# Patient Record
Sex: FEMALE | Race: WHITE | Employment: OTHER | URBAN - METROPOLITAN AREA
[De-identification: names, ages, dates, MRNs, and addresses within clinical notes are randomized per-mention and may not be internally consistent; named-entity substitution may affect disease eponyms.]

---

## 2017-08-18 PROBLEM — G61.81 CHRONIC INFLAMMATORY DEMYELINATING POLYNEURITIS (HCC): Status: ACTIVE | Noted: 2017-08-18

## 2017-08-18 RX ORDER — SODIUM CHLORIDE 9 MG/ML
INJECTION, SOLUTION INTRAVENOUS CONTINUOUS
Status: CANCELLED | OUTPATIENT
Start: 2017-08-21

## 2017-08-18 RX ORDER — 0.9 % SODIUM CHLORIDE 0.9 %
10 VIAL (ML) INJECTION ONCE
Status: CANCELLED | OUTPATIENT
Start: 2017-08-21 | End: 2017-08-21

## 2017-08-18 RX ORDER — DIPHENHYDRAMINE HYDROCHLORIDE 50 MG/ML
50 INJECTION INTRAMUSCULAR; INTRAVENOUS ONCE
Status: CANCELLED | OUTPATIENT
Start: 2017-08-21 | End: 2017-08-21

## 2017-08-18 RX ORDER — METHYLPREDNISOLONE SODIUM SUCCINATE 125 MG/2ML
125 INJECTION, POWDER, LYOPHILIZED, FOR SOLUTION INTRAMUSCULAR; INTRAVENOUS ONCE
Status: CANCELLED | OUTPATIENT
Start: 2017-08-21 | End: 2017-08-21

## 2017-08-21 ENCOUNTER — HOSPITAL ENCOUNTER (OUTPATIENT)
Dept: INFUSION THERAPY | Age: 62
Discharge: HOME | End: 2017-08-31
Attending: PSYCHIATRY & NEUROLOGY | Admitting: PSYCHIATRY & NEUROLOGY

## 2017-08-21 VITALS
HEART RATE: 65 BPM | OXYGEN SATURATION: 92 % | DIASTOLIC BLOOD PRESSURE: 72 MMHG | SYSTOLIC BLOOD PRESSURE: 139 MMHG | TEMPERATURE: 97.8 F | RESPIRATION RATE: 18 BRPM

## 2017-08-21 DIAGNOSIS — G61.81 CHRONIC INFLAMMATORY DEMYELINATING POLYNEURITIS (HCC): ICD-10-CM

## 2017-08-21 RX ORDER — 0.9 % SODIUM CHLORIDE 0.9 %
10 VIAL (ML) INJECTION PRN
Status: ACTIVE | OUTPATIENT
Start: 2017-08-21 | End: 2017-08-22

## 2017-08-21 RX ORDER — DIPHENHYDRAMINE HCL 25 MG
25 TABLET ORAL ONCE
Status: CANCELLED | OUTPATIENT
Start: 2017-08-21 | End: 2017-08-21

## 2017-08-21 RX ORDER — ACETAMINOPHEN 325 MG/1
650 TABLET ORAL ONCE
Status: CANCELLED | OUTPATIENT
Start: 2017-08-21 | End: 2017-08-21

## 2017-08-21 RX ORDER — DIPHENHYDRAMINE HCL 25 MG
25 TABLET ORAL ONCE
Status: COMPLETED | OUTPATIENT
Start: 2017-08-21 | End: 2017-08-21

## 2017-08-21 RX ORDER — ACETAMINOPHEN 325 MG/1
650 TABLET ORAL ONCE
Status: COMPLETED | OUTPATIENT
Start: 2017-08-21 | End: 2017-08-21

## 2017-08-21 RX ORDER — SODIUM CHLORIDE 9 MG/ML
INJECTION, SOLUTION INTRAVENOUS CONTINUOUS
Status: CANCELLED | OUTPATIENT
Start: 2017-08-21

## 2017-08-21 RX ORDER — METHYLPREDNISOLONE SODIUM SUCCINATE 125 MG/2ML
125 INJECTION, POWDER, LYOPHILIZED, FOR SOLUTION INTRAMUSCULAR; INTRAVENOUS ONCE
Status: CANCELLED | OUTPATIENT
Start: 2017-08-21 | End: 2017-08-21

## 2017-08-21 RX ORDER — METAXALONE 800 MG/1
800 TABLET ORAL 2 TIMES DAILY
COMMUNITY

## 2017-08-21 RX ORDER — 0.9 % SODIUM CHLORIDE 0.9 %
10 VIAL (ML) INJECTION ONCE
Status: CANCELLED | OUTPATIENT
Start: 2017-08-21 | End: 2017-08-21

## 2017-08-21 RX ORDER — 0.9 % SODIUM CHLORIDE 0.9 %
10 VIAL (ML) INJECTION PRN
Status: CANCELLED | OUTPATIENT
Start: 2017-08-21

## 2017-08-21 RX ORDER — DIPHENHYDRAMINE HYDROCHLORIDE 50 MG/ML
50 INJECTION INTRAMUSCULAR; INTRAVENOUS ONCE
Status: CANCELLED | OUTPATIENT
Start: 2017-08-21 | End: 2017-08-21

## 2017-08-21 RX ADMIN — Medication 25 MG: at 12:00

## 2017-08-21 RX ADMIN — ACETAMINOPHEN 650 MG: 325 TABLET ORAL at 12:00

## 2017-08-21 ASSESSMENT — PAIN SCALES - GENERAL: PAINLEVEL_OUTOF10: 0

## 2017-08-22 ENCOUNTER — HOSPITAL ENCOUNTER (OUTPATIENT)
Dept: INFUSION THERAPY | Age: 62
Discharge: HOME | End: 2017-08-23
Admitting: PSYCHIATRY & NEUROLOGY

## 2017-08-22 VITALS
OXYGEN SATURATION: 94 % | RESPIRATION RATE: 18 BRPM | DIASTOLIC BLOOD PRESSURE: 76 MMHG | SYSTOLIC BLOOD PRESSURE: 146 MMHG | TEMPERATURE: 98 F | HEART RATE: 66 BPM

## 2017-08-22 DIAGNOSIS — G61.81 CHRONIC INFLAMMATORY DEMYELINATING POLYNEURITIS (HCC): ICD-10-CM

## 2017-08-22 RX ORDER — ACETAMINOPHEN 325 MG/1
650 TABLET ORAL ONCE
Status: COMPLETED | OUTPATIENT
Start: 2017-08-22 | End: 2017-08-22

## 2017-08-22 RX ORDER — ACETAMINOPHEN 325 MG/1
650 TABLET ORAL ONCE
Status: CANCELLED | OUTPATIENT
Start: 2017-08-22 | End: 2017-08-22

## 2017-08-22 RX ORDER — DIPHENHYDRAMINE HCL 25 MG
25 TABLET ORAL ONCE
Status: COMPLETED | OUTPATIENT
Start: 2017-08-22 | End: 2017-08-22

## 2017-08-22 RX ORDER — 0.9 % SODIUM CHLORIDE 0.9 %
10 VIAL (ML) INJECTION PRN
Status: ACTIVE | OUTPATIENT
Start: 2017-08-22 | End: 2017-08-23

## 2017-08-22 RX ORDER — 0.9 % SODIUM CHLORIDE 0.9 %
10 VIAL (ML) INJECTION ONCE
Status: CANCELLED | OUTPATIENT
Start: 2017-08-22 | End: 2017-08-22

## 2017-08-22 RX ORDER — SODIUM CHLORIDE 9 MG/ML
INJECTION, SOLUTION INTRAVENOUS CONTINUOUS
Status: CANCELLED | OUTPATIENT
Start: 2017-08-22

## 2017-08-22 RX ORDER — DIPHENHYDRAMINE HCL 25 MG
25 TABLET ORAL ONCE
Status: CANCELLED | OUTPATIENT
Start: 2017-08-22 | End: 2017-08-22

## 2017-08-22 RX ORDER — 0.9 % SODIUM CHLORIDE 0.9 %
10 VIAL (ML) INJECTION PRN
Status: CANCELLED | OUTPATIENT
Start: 2017-08-22

## 2017-08-22 RX ORDER — DIPHENHYDRAMINE HYDROCHLORIDE 50 MG/ML
50 INJECTION INTRAMUSCULAR; INTRAVENOUS ONCE
Status: CANCELLED | OUTPATIENT
Start: 2017-08-22 | End: 2017-08-22

## 2017-08-22 RX ORDER — METHYLPREDNISOLONE SODIUM SUCCINATE 125 MG/2ML
125 INJECTION, POWDER, LYOPHILIZED, FOR SOLUTION INTRAMUSCULAR; INTRAVENOUS ONCE
Status: CANCELLED | OUTPATIENT
Start: 2017-08-22 | End: 2017-08-22

## 2017-08-22 RX ADMIN — Medication 25 MG: at 11:57

## 2017-08-22 RX ADMIN — ACETAMINOPHEN 650 MG: 325 TABLET ORAL at 11:57

## 2017-08-22 ASSESSMENT — PAIN SCALES - GENERAL
PAINLEVEL_OUTOF10: 0
PAINLEVEL_OUTOF10: 0

## 2019-10-21 RX ORDER — EPINEPHRINE 1 MG/ML
0.3 INJECTION, SOLUTION, CONCENTRATE INTRAVENOUS PRN
Status: CANCELLED | OUTPATIENT
Start: 2019-10-28

## 2019-10-21 RX ORDER — SODIUM CHLORIDE 9 MG/ML
INJECTION, SOLUTION INTRAVENOUS CONTINUOUS
Status: CANCELLED | OUTPATIENT
Start: 2019-10-28

## 2019-10-21 RX ORDER — SODIUM CHLORIDE 0.9 % (FLUSH) 0.9 %
10 SYRINGE (ML) INJECTION PRN
Status: CANCELLED | OUTPATIENT
Start: 2019-10-28

## 2019-10-21 RX ORDER — ACETAMINOPHEN 325 MG/1
650 TABLET ORAL ONCE
Status: CANCELLED | OUTPATIENT
Start: 2019-10-28

## 2019-10-21 RX ORDER — METHYLPREDNISOLONE SODIUM SUCCINATE 125 MG/2ML
125 INJECTION, POWDER, LYOPHILIZED, FOR SOLUTION INTRAMUSCULAR; INTRAVENOUS ONCE
Status: CANCELLED | OUTPATIENT
Start: 2019-10-28

## 2019-10-21 RX ORDER — DIPHENHYDRAMINE HYDROCHLORIDE 50 MG/ML
50 INJECTION INTRAMUSCULAR; INTRAVENOUS ONCE
Status: CANCELLED | OUTPATIENT
Start: 2019-10-28

## 2019-10-21 RX ORDER — DIPHENHYDRAMINE HCL 25 MG
25 TABLET ORAL ONCE
Status: CANCELLED | OUTPATIENT
Start: 2019-10-28

## 2019-12-02 ENCOUNTER — HOSPITAL ENCOUNTER (OUTPATIENT)
Dept: INFUSION THERAPY | Age: 64
Setting detail: INFUSION SERIES
Discharge: HOME | End: 2019-12-02
Payer: MEDICARE

## 2019-12-02 VITALS
WEIGHT: 244.49 LBS | HEART RATE: 65 BPM | SYSTOLIC BLOOD PRESSURE: 141 MMHG | BODY MASS INDEX: 40.69 KG/M2 | RESPIRATION RATE: 18 BRPM | DIASTOLIC BLOOD PRESSURE: 91 MMHG | OXYGEN SATURATION: 94 % | TEMPERATURE: 97.6 F

## 2019-12-02 DIAGNOSIS — G61.81 CHRONIC INFLAMMATORY DEMYELINATING POLYNEURITIS (HCC): Primary | ICD-10-CM

## 2019-12-02 PROCEDURE — 96366 THER/PROPH/DIAG IV INF ADDON: CPT

## 2019-12-02 PROCEDURE — 96365 THER/PROPH/DIAG IV INF INIT: CPT

## 2019-12-02 PROCEDURE — 2580000003 HC RX 258: Performed by: PSYCHIATRY & NEUROLOGY

## 2019-12-02 PROCEDURE — 6370000000 HC RX 637 (ALT 250 FOR IP): Performed by: PSYCHIATRY & NEUROLOGY

## 2019-12-02 PROCEDURE — 6360000002 HC RX W HCPCS: Performed by: PSYCHIATRY & NEUROLOGY

## 2019-12-02 RX ORDER — ACETAMINOPHEN 325 MG/1
650 TABLET ORAL ONCE
Status: COMPLETED | OUTPATIENT
Start: 2019-12-02 | End: 2019-12-02

## 2019-12-02 RX ORDER — SODIUM CHLORIDE 0.9 % (FLUSH) 0.9 %
10 SYRINGE (ML) INJECTION PRN
Status: CANCELLED | OUTPATIENT
Start: 2019-12-03

## 2019-12-02 RX ORDER — DIPHENHYDRAMINE HCL 25 MG
25 TABLET ORAL ONCE
Status: CANCELLED | OUTPATIENT
Start: 2019-12-03

## 2019-12-02 RX ORDER — SODIUM CHLORIDE 9 MG/ML
INJECTION, SOLUTION INTRAVENOUS CONTINUOUS
Status: CANCELLED | OUTPATIENT
Start: 2019-12-03

## 2019-12-02 RX ORDER — ACETAMINOPHEN 325 MG/1
650 TABLET ORAL ONCE
Status: CANCELLED | OUTPATIENT
Start: 2019-12-03

## 2019-12-02 RX ORDER — DIPHENHYDRAMINE HCL 25 MG
25 TABLET ORAL ONCE
Status: COMPLETED | OUTPATIENT
Start: 2019-12-02 | End: 2019-12-02

## 2019-12-02 RX ORDER — PREDNISONE 1 MG/1
1.5 TABLET ORAL DAILY
COMMUNITY

## 2019-12-02 RX ORDER — DIPHENHYDRAMINE HYDROCHLORIDE 50 MG/ML
50 INJECTION INTRAMUSCULAR; INTRAVENOUS ONCE
Status: CANCELLED | OUTPATIENT
Start: 2019-12-03

## 2019-12-02 RX ORDER — METOPROLOL TARTRATE 50 MG/1
100 TABLET, FILM COATED ORAL DAILY
COMMUNITY

## 2019-12-02 RX ORDER — SODIUM CHLORIDE 0.9 % (FLUSH) 0.9 %
10 SYRINGE (ML) INJECTION PRN
Status: DISCONTINUED | OUTPATIENT
Start: 2019-12-02 | End: 2019-12-03 | Stop reason: HOSPADM

## 2019-12-02 RX ORDER — METHYLPREDNISOLONE SODIUM SUCCINATE 125 MG/2ML
125 INJECTION, POWDER, LYOPHILIZED, FOR SOLUTION INTRAMUSCULAR; INTRAVENOUS ONCE
Status: CANCELLED | OUTPATIENT
Start: 2019-12-03

## 2019-12-02 RX ORDER — EPINEPHRINE 1 MG/ML
0.3 INJECTION, SOLUTION, CONCENTRATE INTRAVENOUS PRN
Status: CANCELLED | OUTPATIENT
Start: 2019-12-03

## 2019-12-02 RX ADMIN — IMMUNE GLOBULIN (HUMAN) 10 G: 10 INJECTION INTRAVENOUS; SUBCUTANEOUS at 12:56

## 2019-12-02 RX ADMIN — DIPHENHYDRAMINE HCL 25 MG: 25 TABLET ORAL at 12:03

## 2019-12-02 RX ADMIN — Medication 10 ML: at 12:52

## 2019-12-02 RX ADMIN — IMMUNE GLOBULIN (HUMAN) 10 G: 10 INJECTION INTRAVENOUS; SUBCUTANEOUS at 15:43

## 2019-12-02 RX ADMIN — IMMUNE GLOBULIN (HUMAN) 10 G: 10 INJECTION INTRAVENOUS; SUBCUTANEOUS at 14:43

## 2019-12-02 RX ADMIN — IMMUNE GLOBULIN (HUMAN) 20 G: 10 INJECTION INTRAVENOUS; SUBCUTANEOUS at 15:40

## 2019-12-02 RX ADMIN — ACETAMINOPHEN 650 MG: 325 TABLET ORAL at 12:03

## 2019-12-02 ASSESSMENT — PAIN SCALES - GENERAL: PAINLEVEL_OUTOF10: 0

## 2019-12-02 NOTE — PROGRESS NOTES
Outpatient [address] [facility]    IVIG Infusion    NAME:  April Nina OF BIRTH:  1955  MEDICAL RECORD NUMBER:  [id number]  DATE:  12/2/2019    Patient arrived to [facility] 58   [x] per wheelchair   [] ambulatory      Patient here for IVIG infusion for diagnosis of CIDP, Chronic inflammatory demyelinating polyneuropathy. Patient has severe pain and weakness in both legs. Uses a walker to ambulate and a wheelchair for long distances. Couple of years ago she suffered a fall and fractured tailbone which was fused together and still has some sciatica pain down both legs. History of auto immune disorder, asthma and HTN. She lives alone and gets help from extended family for rides for appointments. Has some concern today because she is not getting the Gamma ariel liqiud IVIG which she usually has but hospital unable to get this brand. She will be getting Gammunex IVIG. Patient follows with Dr. Abdon Moody and she will be getting 100 grams IVIG divided over 2 days. Is the patient experiencing any:    Fatigue:   []   None  []   Increase over baseline but not altering normal activities  [x]   Moderate of causing difficulty performing some activities  []   Severe or loss of ability to perform some activities  []   Bedridden or disabling    Dizziness or Lightheadedness:   [x]   None  []   No Interference  []   Interferes with functioning but not activities of daily living  []   Interferes with daily activies  []   Bedridden or disabling    Shortness of Breath:   [x]   None   []   Dyspneic on exertion   []   Dyspnea with normal activities  []   Dyspnea at rest    Are you on antibiotics presently? No, not presently but did have a chest cold couple of weaks ago. Denies any fever or residual cough. Any recent illness or infection? Yes a couple of weeks ago but OK now    Any fever, chilling or night sweats?  No    Dosage: 50 Grams     BP (!) 141/91   Pulse 65   Temp 97.6 °F (36.4 °C) (Oral)   Resp 18   Wt 244 lb 7.8 oz (110.9 kg)   SpO2 94%   BMI 40.69 kg/m²      CMP done on 10/30/19:        Creatinine 0.67        GFR 88    Patient premedicated with Benadryl 25 mg po and Tylenol 650 mg po as ordered. Patient getting 50 grams of Gammunex IVIG today and 50 grams tomorrow. Had some difficulty getting IV started and took about 3 attempts. IVIG started at 15 ml/hr and rate gradually increased every 30 min to 30 ml/hr, 60 ml/hr, 100 ml/hr, 120 ml/hr and 130 ml/hr. Complained of a little headache when rate up to 130 ml/hr so rate dropped back down to 120 ml. With last bottle headache went away and last bottle finished out at 130 ml/hour with slight headache. BP remained around 580-304 systolic and 92-10 diastolic. Patient took her 2nd dose of BP medicine after lunch and BP readings came down and headache better. No flushing, fever or chills. Tolerated IVIG well. See flow sheet for rate changes and vital signs. IVIG infused over 5 hours. Patient tolerated well. Her cousin came to give her a ride home. Discharged per wheelchair. Will return tomorrow for another 50 grams of IVIG.

## 2019-12-03 ENCOUNTER — HOSPITAL ENCOUNTER (OUTPATIENT)
Dept: INFUSION THERAPY | Age: 64
Setting detail: INFUSION SERIES
Discharge: HOME | End: 2019-12-03
Payer: MEDICARE

## 2019-12-03 VITALS
TEMPERATURE: 97.5 F | RESPIRATION RATE: 18 BRPM | SYSTOLIC BLOOD PRESSURE: 153 MMHG | OXYGEN SATURATION: 94 % | HEART RATE: 64 BPM | DIASTOLIC BLOOD PRESSURE: 75 MMHG

## 2019-12-03 DIAGNOSIS — G61.81 CHRONIC INFLAMMATORY DEMYELINATING POLYNEURITIS (HCC): Primary | ICD-10-CM

## 2019-12-03 PROCEDURE — 96366 THER/PROPH/DIAG IV INF ADDON: CPT

## 2019-12-03 PROCEDURE — 96365 THER/PROPH/DIAG IV INF INIT: CPT

## 2019-12-03 PROCEDURE — 6370000000 HC RX 637 (ALT 250 FOR IP): Performed by: PSYCHIATRY & NEUROLOGY

## 2019-12-03 PROCEDURE — 6360000002 HC RX W HCPCS: Performed by: PSYCHIATRY & NEUROLOGY

## 2019-12-03 PROCEDURE — 2580000003 HC RX 258: Performed by: PSYCHIATRY & NEUROLOGY

## 2019-12-03 RX ORDER — SODIUM CHLORIDE 9 MG/ML
INJECTION, SOLUTION INTRAVENOUS CONTINUOUS
Status: CANCELLED | OUTPATIENT
Start: 2019-12-03

## 2019-12-03 RX ORDER — DIPHENHYDRAMINE HCL 25 MG
25 TABLET ORAL ONCE
Status: CANCELLED | OUTPATIENT
Start: 2019-12-03

## 2019-12-03 RX ORDER — METHYLPREDNISOLONE SODIUM SUCCINATE 125 MG/2ML
125 INJECTION, POWDER, LYOPHILIZED, FOR SOLUTION INTRAMUSCULAR; INTRAVENOUS ONCE
Status: CANCELLED | OUTPATIENT
Start: 2019-12-03

## 2019-12-03 RX ORDER — EPINEPHRINE 1 MG/ML
0.3 INJECTION, SOLUTION, CONCENTRATE INTRAVENOUS PRN
Status: CANCELLED | OUTPATIENT
Start: 2019-12-03

## 2019-12-03 RX ORDER — ACETAMINOPHEN 325 MG/1
650 TABLET ORAL ONCE
Status: CANCELLED | OUTPATIENT
Start: 2019-12-03

## 2019-12-03 RX ORDER — DIPHENHYDRAMINE HCL 25 MG
25 TABLET ORAL ONCE
Status: COMPLETED | OUTPATIENT
Start: 2019-12-03 | End: 2019-12-03

## 2019-12-03 RX ORDER — SODIUM CHLORIDE 0.9 % (FLUSH) 0.9 %
10 SYRINGE (ML) INJECTION PRN
Status: DISCONTINUED | OUTPATIENT
Start: 2019-12-03 | End: 2019-12-04 | Stop reason: HOSPADM

## 2019-12-03 RX ORDER — DIPHENHYDRAMINE HYDROCHLORIDE 50 MG/ML
50 INJECTION INTRAMUSCULAR; INTRAVENOUS ONCE
Status: CANCELLED | OUTPATIENT
Start: 2019-12-03

## 2019-12-03 RX ORDER — ACETAMINOPHEN 325 MG/1
650 TABLET ORAL ONCE
Status: COMPLETED | OUTPATIENT
Start: 2019-12-03 | End: 2019-12-03

## 2019-12-03 RX ORDER — SODIUM CHLORIDE 0.9 % (FLUSH) 0.9 %
10 SYRINGE (ML) INJECTION PRN
Status: CANCELLED | OUTPATIENT
Start: 2019-12-03

## 2019-12-03 RX ADMIN — DIPHENHYDRAMINE HCL 25 MG: 25 TABLET ORAL at 12:16

## 2019-12-03 RX ADMIN — IMMUNE GLOBULIN (HUMAN) 10 G: 10 INJECTION INTRAVENOUS; SUBCUTANEOUS at 12:26

## 2019-12-03 RX ADMIN — ACETAMINOPHEN 650 MG: 325 TABLET ORAL at 12:16

## 2019-12-03 RX ADMIN — IMMUNE GLOBULIN (HUMAN) 40 G: 10 INJECTION INTRAVENOUS; SUBCUTANEOUS at 13:45

## 2019-12-03 RX ADMIN — Medication 10 ML: at 17:18

## 2019-12-03 RX ADMIN — Medication 10 ML: at 17:02

## 2019-12-03 RX ADMIN — Medication 10 ML: at 12:08

## 2019-12-03 ASSESSMENT — PAIN SCALES - GENERAL
PAINLEVEL_OUTOF10: 0
PAINLEVEL_OUTOF10: 0

## 2019-12-03 NOTE — PROGRESS NOTES
[facility]    [facility]    NAME:  Ermelinda Seed OF BIRTH:  1955  MEDICAL RECORD NUMBER:  [id number]  DATE:  12/3/2019    Patient arrived to [facility] 58   [] per wheelchair   [x] ambulatory     Patient here for 2nd dose of IVIG. Tolerated 50 grams of IVIG well yesterday. Denies any headache, fever, chilling or itching. Patient is getting IVIG for CIPD which causes pain and weakness in both legs and is being treated by Dr. Venita Kang, neurologist.  Wilmer Rincon with walker. Has had limited mobility for several years, has an elevator in her house. Has had bariatric surgery, gastric bypass in Jan 2019 and has lost about 109 lbs. Is the patient experiencing any:    Fatigue:   []   None  []   Increase over baseline but not altering normal activities  [x]   Moderate of causing difficulty performing some activities  []   Severe or loss of ability to perform some activities  []   Bedridden or disabling    Dizziness or Lightheadedness:   [x]   None  []   No Interference  []   Interferes with functioning but not activities of daily living  []   Interferes with daily activies  []   Bedridden or disabling    Shortness of Breath:   []   None   [x]   Dyspneic on exertion   []   Dyspnea with normal activities  []   Dyspnea at rest    Are you on antibiotics presently? No    Any recent illness or infection? No    Any fever, chilling or night sweats? No    Dosage: 50 Grams     BP (!) 149/88   Pulse 65   Temp 97.8 °F (36.6 °C) (Oral)   Resp 18   SpO2 95%       CMP:  Done on 10/30/19 - Creatinine 0.67  And GFR 88. Care Everywhere Result Report    COMPREHENSIVE METABOLIC PANELResulted: 80/22/2552 9:13 PM  [facility]  Component Name Value Ref Range   Sodium 137 135 - 146 mmol/L   Potassium 4.4 3.5 - 5.1 mmol/L   Chloride 101 98 - 110 mmol/L   CO2 28 22 - 29 mmol/L   Anion Gap 8   Comment: Anion gap calculation does not include potassium (K+) value.  5 - 13 mmol/L   BUN 12 7 - 25 mg/dL   Creatinine 0.67 0.5 - 1.2 mg/dL   Glucose 96 70 - 99 mg/dL   GFR MDRD Af Amer 107   Comment:  GFR is estimated using Creatinine, age, gender and race. Patient's values should be interpreted as a trend.      Below 90 ml/min/1.73m2, the patient may have renal disease.       For additional information:     [email or web address] See Note   Calcium 9.8 8.4 - 10.5 mg/dL   GFR MDRD Non Af Amer 88   Comment:  GFR is estimated using Creatinine, age, gender and race. Patient's values should be interpreted as a trend.      Between 30 and 90 ml/min/1.73m2, clinical correlation is needed.     For additional information:     [email or web address] org See Note   Total Bilirubin 0.4 0.2 - 1.2 mg/dL   AST 13 0 - 30 U/L   ALT 13 0 - 40 U/L   Alkaline Phosphatase 113 33 - 140 U/L   Total Protein 7.1 6 - 8 g/dL   Albumin 4.1 3.5 - 5 g/dL   Globulin 3.0 2 - 3.7 g/dL   Albumin/Globulin Ratio 1.4 1 - 2.1    BUN/Creatinine Ratio 18     Specimen Collected on   SERUM - Serum 10/30/2019 3:24 PM           Patient received total of 50 grams Gammunex IVIG today. Infusion started at 50 ml/hr and increased slowly about every 30 minutes until rate at 130 ml/hr and patient finished infusion at this rate. Total infusion lasting 4.5 hours. Patient tolerated infusion well and denied any fever, chilling, headache, itching or SOB. Vital signs stable. Monitoring of vital signs and rate changes are documented in flow sheets. Response to treatment:  Well tolerated by patient.       Electronically signed by Sukhi Oshea RN on 12/3/2019 at 7:14 PM.

## 2019-12-26 ENCOUNTER — HOSPITAL ENCOUNTER (EMERGENCY)
Age: 64
Discharge: HOME | End: 2019-12-26
Payer: MEDICARE

## 2019-12-26 VITALS
BODY MASS INDEX: 40.06 KG/M2 | HEART RATE: 88 BPM | TEMPERATURE: 97.8 F | DIASTOLIC BLOOD PRESSURE: 94 MMHG | WEIGHT: 240.74 LBS | SYSTOLIC BLOOD PRESSURE: 193 MMHG | RESPIRATION RATE: 16 BRPM | OXYGEN SATURATION: 96 %

## 2019-12-26 DIAGNOSIS — R21 RASH AND OTHER NONSPECIFIC SKIN ERUPTION: Primary | ICD-10-CM

## 2019-12-26 DIAGNOSIS — Z87.2 HISTORY OF PSORIASIS: ICD-10-CM

## 2019-12-26 PROCEDURE — 6360000002 HC RX W HCPCS: Performed by: PHYSICIAN ASSISTANT

## 2019-12-26 PROCEDURE — 99282 EMERGENCY DEPT VISIT SF MDM: CPT

## 2019-12-26 PROCEDURE — 96372 THER/PROPH/DIAG INJ SC/IM: CPT

## 2019-12-26 RX ORDER — TRIAMCINOLONE ACETONIDE 1 MG/G
CREAM TOPICAL
Qty: 80 G | Refills: 0 | Status: SHIPPED | OUTPATIENT
Start: 2019-12-26 | End: 2021-07-19

## 2019-12-26 RX ORDER — METHYLPREDNISOLONE ACETATE 80 MG/ML
120 INJECTION, SUSPENSION INTRA-ARTICULAR; INTRALESIONAL; INTRAMUSCULAR; SOFT TISSUE ONCE
Status: COMPLETED | OUTPATIENT
Start: 2019-12-26 | End: 2019-12-26

## 2019-12-26 RX ADMIN — METHYLPREDNISOLONE ACETATE 120 MG: 80 INJECTION, SUSPENSION INTRA-ARTICULAR; INTRALESIONAL; INTRAMUSCULAR; SOFT TISSUE at 15:51

## 2020-01-09 ENCOUNTER — TELEPHONE (OUTPATIENT)
Dept: INFUSION THERAPY | Age: 65
End: 2020-01-09

## 2020-10-15 ENCOUNTER — TELEPHONE (OUTPATIENT)
Dept: INFUSION THERAPY | Age: 65
End: 2020-10-15

## 2020-10-15 NOTE — TELEPHONE ENCOUNTER
Patient is due for an IVIG treatment. Dr. Lyric Murphy is sending an order and patient scheduled For 11/9/2020 and 11/10/2020. Patient wants to hold off on IVIG right now due to increase in COVID cases. She is worried about getting sick because she is immunocompromised. Will hold off IVIG appointment appointment for now. Patient will call us when she is ready.

## 2020-11-09 ENCOUNTER — HOSPITAL ENCOUNTER (OUTPATIENT)
Dept: INFUSION THERAPY | Age: 65
Setting detail: INFUSION SERIES
End: 2020-11-09
Payer: MEDICARE

## 2021-03-03 ENCOUNTER — IMMUNIZATION (OUTPATIENT)
Dept: PRIMARY CARE CLINIC | Age: 66
End: 2021-03-03
Payer: MEDICARE

## 2021-03-03 PROCEDURE — 91301 COVID-19, MODERNA VACCINE 100MCG/0.5ML DOSE: CPT | Performed by: FAMILY MEDICINE

## 2021-03-03 PROCEDURE — 0011A COVID-19, MODERNA VACCINE 100MCG/0.5ML DOSE: CPT | Performed by: FAMILY MEDICINE

## 2021-03-31 ENCOUNTER — IMMUNIZATION (OUTPATIENT)
Dept: PRIMARY CARE CLINIC | Age: 66
End: 2021-03-31
Payer: MEDICARE

## 2021-03-31 PROCEDURE — 91301 COVID-19, MODERNA VACCINE 100MCG/0.5ML DOSE: CPT | Performed by: FAMILY MEDICINE

## 2021-03-31 PROCEDURE — 0012A COVID-19, MODERNA VACCINE 100MCG/0.5ML DOSE: CPT | Performed by: FAMILY MEDICINE

## 2021-06-22 RX ORDER — DIPHENHYDRAMINE HYDROCHLORIDE 50 MG/ML
50 INJECTION INTRAMUSCULAR; INTRAVENOUS ONCE
Status: CANCELLED | OUTPATIENT
Start: 2021-07-19 | End: 2021-07-19

## 2021-06-22 RX ORDER — EPINEPHRINE 1 MG/ML
0.3 INJECTION, SOLUTION, CONCENTRATE INTRAVENOUS PRN
Status: CANCELLED | OUTPATIENT
Start: 2021-07-19

## 2021-06-22 RX ORDER — DIPHENHYDRAMINE HCL 25 MG
25 TABLET ORAL ONCE
Status: CANCELLED | OUTPATIENT
Start: 2021-07-19 | End: 2021-07-19

## 2021-06-22 RX ORDER — SODIUM CHLORIDE 9 MG/ML
INJECTION, SOLUTION INTRAVENOUS CONTINUOUS
Status: CANCELLED | OUTPATIENT
Start: 2021-07-19

## 2021-06-22 RX ORDER — SODIUM CHLORIDE 0.9 % (FLUSH) 0.9 %
5-40 SYRINGE (ML) INJECTION PRN
Status: CANCELLED | OUTPATIENT
Start: 2021-07-19

## 2021-06-22 RX ORDER — ACETAMINOPHEN 325 MG/1
650 TABLET ORAL ONCE
Status: CANCELLED | OUTPATIENT
Start: 2021-07-19 | End: 2021-07-19

## 2021-06-22 RX ORDER — METHYLPREDNISOLONE SODIUM SUCCINATE 125 MG/2ML
125 INJECTION, POWDER, LYOPHILIZED, FOR SOLUTION INTRAMUSCULAR; INTRAVENOUS ONCE
Status: CANCELLED | OUTPATIENT
Start: 2021-07-19 | End: 2021-07-19

## 2021-07-19 ENCOUNTER — HOSPITAL ENCOUNTER (OUTPATIENT)
Dept: INFUSION THERAPY | Age: 66
Setting detail: INFUSION SERIES
Discharge: HOME | End: 2021-07-19
Payer: MEDICARE

## 2021-07-19 VITALS
DIASTOLIC BLOOD PRESSURE: 73 MMHG | OXYGEN SATURATION: 95 % | BODY MASS INDEX: 30.85 KG/M2 | WEIGHT: 185.41 LBS | HEART RATE: 64 BPM | TEMPERATURE: 97.7 F | SYSTOLIC BLOOD PRESSURE: 148 MMHG | RESPIRATION RATE: 18 BRPM

## 2021-07-19 DIAGNOSIS — G61.81 CHRONIC INFLAMMATORY DEMYELINATING POLYNEURITIS (HCC): Primary | ICD-10-CM

## 2021-07-19 PROCEDURE — 96365 THER/PROPH/DIAG IV INF INIT: CPT

## 2021-07-19 PROCEDURE — 2580000003 HC RX 258: Performed by: PSYCHIATRY & NEUROLOGY

## 2021-07-19 PROCEDURE — 6370000000 HC RX 637 (ALT 250 FOR IP): Performed by: PSYCHIATRY & NEUROLOGY

## 2021-07-19 PROCEDURE — 96366 THER/PROPH/DIAG IV INF ADDON: CPT

## 2021-07-19 PROCEDURE — 6360000002 HC RX W HCPCS: Performed by: PSYCHIATRY & NEUROLOGY

## 2021-07-19 RX ORDER — EPINEPHRINE 1 MG/ML
0.3 INJECTION, SOLUTION, CONCENTRATE INTRAVENOUS PRN
Status: CANCELLED | OUTPATIENT
Start: 2021-07-20

## 2021-07-19 RX ORDER — DIPHENHYDRAMINE HCL 25 MG
25 TABLET ORAL ONCE
Status: CANCELLED | OUTPATIENT
Start: 2021-07-20 | End: 2021-07-20

## 2021-07-19 RX ORDER — SODIUM CHLORIDE 0.9 % (FLUSH) 0.9 %
5-40 SYRINGE (ML) INJECTION PRN
Status: CANCELLED | OUTPATIENT
Start: 2021-07-20

## 2021-07-19 RX ORDER — ACETAMINOPHEN 325 MG/1
650 TABLET ORAL ONCE
Status: COMPLETED | OUTPATIENT
Start: 2021-07-19 | End: 2021-07-19

## 2021-07-19 RX ORDER — DIPHENHYDRAMINE HCL 25 MG
25 TABLET ORAL ONCE
Status: COMPLETED | OUTPATIENT
Start: 2021-07-19 | End: 2021-07-19

## 2021-07-19 RX ORDER — SODIUM CHLORIDE 0.9 % (FLUSH) 0.9 %
5-40 SYRINGE (ML) INJECTION PRN
Status: DISCONTINUED | OUTPATIENT
Start: 2021-07-19 | End: 2021-07-20 | Stop reason: HOSPADM

## 2021-07-19 RX ORDER — DIPHENHYDRAMINE HYDROCHLORIDE 50 MG/ML
50 INJECTION INTRAMUSCULAR; INTRAVENOUS ONCE
Status: CANCELLED | OUTPATIENT
Start: 2021-07-20 | End: 2021-07-20

## 2021-07-19 RX ORDER — METHYLPREDNISOLONE SODIUM SUCCINATE 125 MG/2ML
125 INJECTION, POWDER, LYOPHILIZED, FOR SOLUTION INTRAMUSCULAR; INTRAVENOUS ONCE
Status: CANCELLED | OUTPATIENT
Start: 2021-07-20 | End: 2021-07-20

## 2021-07-19 RX ORDER — SODIUM CHLORIDE 9 MG/ML
INJECTION, SOLUTION INTRAVENOUS CONTINUOUS
Status: CANCELLED | OUTPATIENT
Start: 2021-07-20

## 2021-07-19 RX ORDER — ACETAMINOPHEN 325 MG/1
650 TABLET ORAL ONCE
Status: CANCELLED | OUTPATIENT
Start: 2021-07-20 | End: 2021-07-20

## 2021-07-19 RX ADMIN — Medication 10 ML: at 13:09

## 2021-07-19 RX ADMIN — IMMUNE GLOBULIN INFUSION (HUMAN) 20 G: 100 INJECTION, SOLUTION INTRAVENOUS; SUBCUTANEOUS at 16:00

## 2021-07-19 RX ADMIN — ACETAMINOPHEN 650 MG: 325 TABLET ORAL at 12:54

## 2021-07-19 RX ADMIN — DIPHENHYDRAMINE HCL 25 MG: 25 TABLET ORAL at 12:54

## 2021-07-19 RX ADMIN — IMMUNE GLOBULIN INFUSION (HUMAN) 10 G: 100 INJECTION, SOLUTION INTRAVENOUS; SUBCUTANEOUS at 13:15

## 2021-07-19 RX ADMIN — IMMUNE GLOBULIN INFUSION (HUMAN) 20 G: 100 INJECTION, SOLUTION INTRAVENOUS; SUBCUTANEOUS at 14:29

## 2021-07-19 ASSESSMENT — PAIN SCALES - GENERAL: PAINLEVEL_OUTOF10: 0

## 2021-07-19 NOTE — PROGRESS NOTES
Outpatient [address] [facility]    IVIG Infusion    NAME:  Azul Blanco OF BIRTH:  1955  MEDICAL RECORD NUMBER:  [id number]  DATE:  7/19/2021    Patient arrived to [facility] 58   [] per wheelchair   [x] ambulatory      Patient getting IVIG to treat CIDP with leg pain and weakness , pain mostly arms, legs, hands and feet but does get pain all over sometimes. Numbness and tingling as well. Has had Julianne Pleas in past and then diagnosed with CIDP in 2017. Patient has multiple allergies and sensitivities to drugs . Has been intolerant to several different brands of IVIG and the gammagard liquid has been the one that works best. Had Bariatric bypass surgery in 1/2019 and has lost over 180 lbs. IVIG helps with the leg weakness. Is the patient experiencing any:    Fatigue:   []   None  []   Increase over baseline but not altering normal activities  [x]   Moderate of causing difficulty performing some activities  []   Severe or loss of ability to perform some activities  []   Bedridden or disabling    Dizziness or Lightheadedness: Once in a while  []   None  [x]   No Interference  []   Interferes with functioning but not activities of daily living  []   Interferes with daily activies  []   Bedridden or disabling    Shortness of Breath:   [x]   None   []   Dyspneic on exertion   []   Dyspnea with normal activities  []   Dyspnea at rest    Are you on antibiotics presently? No     Any recent illness or infection? No    Any fever, chilling or night sweats? No    Dosage:  Total of 100 Grams Gammagard Liquid  Divided over 2 days     BP (!) 162/81   Pulse 71   Temp 97.6 °F (36.4 °C) (Oral)   Resp 18   SpO2 98%       CMP:    Lab Results   Component Value Date     07/14/2014    K 4.4 07/14/2014    CL 92 07/14/2014    CO2 29 07/14/2014    BUN 15 07/14/2014    PROT 7.2 06/24/2014    PROT 7.3 03/12/2013         Monitoring of vital signs and rate changes are documented in flow sheets. Patient tolerated IVIG well. Denies any headache. BP a little high with starting IVIG but she took her BP pill after eating something and readings became more stable. Started infusion at 50 ml/hr and gradually increased rate every 30 min up to 165 ml/hr. Response to treatment:  Well tolerated by patient.       Patient to return tomorrow for another 50 grams of IVIG,    Electronically signed by Dirk Hudson RN on 7/19/2021 at 5:45 PM.

## 2021-07-20 ENCOUNTER — HOSPITAL ENCOUNTER (OUTPATIENT)
Dept: INFUSION THERAPY | Age: 66
Setting detail: INFUSION SERIES
Discharge: HOME | End: 2021-07-20
Payer: MEDICARE

## 2021-07-20 VITALS
RESPIRATION RATE: 18 BRPM | DIASTOLIC BLOOD PRESSURE: 70 MMHG | SYSTOLIC BLOOD PRESSURE: 178 MMHG | OXYGEN SATURATION: 98 % | HEART RATE: 64 BPM | TEMPERATURE: 97.9 F

## 2021-07-20 DIAGNOSIS — G61.81 CHRONIC INFLAMMATORY DEMYELINATING POLYNEURITIS (HCC): Primary | ICD-10-CM

## 2021-07-20 PROCEDURE — 6370000000 HC RX 637 (ALT 250 FOR IP): Performed by: PSYCHIATRY & NEUROLOGY

## 2021-07-20 PROCEDURE — 96366 THER/PROPH/DIAG IV INF ADDON: CPT

## 2021-07-20 PROCEDURE — 6360000002 HC RX W HCPCS: Performed by: PSYCHIATRY & NEUROLOGY

## 2021-07-20 PROCEDURE — 96365 THER/PROPH/DIAG IV INF INIT: CPT

## 2021-07-20 RX ORDER — DIPHENHYDRAMINE HYDROCHLORIDE 50 MG/ML
50 INJECTION INTRAMUSCULAR; INTRAVENOUS ONCE
Status: CANCELLED | OUTPATIENT
Start: 2021-07-20 | End: 2021-07-20

## 2021-07-20 RX ORDER — SODIUM CHLORIDE 0.9 % (FLUSH) 0.9 %
5-40 SYRINGE (ML) INJECTION PRN
Status: CANCELLED | OUTPATIENT
Start: 2021-07-20

## 2021-07-20 RX ORDER — SODIUM CHLORIDE 0.9 % (FLUSH) 0.9 %
5-40 SYRINGE (ML) INJECTION PRN
Status: DISCONTINUED | OUTPATIENT
Start: 2021-07-20 | End: 2021-07-21 | Stop reason: HOSPADM

## 2021-07-20 RX ORDER — EPINEPHRINE 1 MG/ML
0.3 INJECTION, SOLUTION, CONCENTRATE INTRAVENOUS PRN
Status: CANCELLED | OUTPATIENT
Start: 2021-07-20

## 2021-07-20 RX ORDER — DIPHENHYDRAMINE HCL 25 MG
25 TABLET ORAL ONCE
Status: CANCELLED | OUTPATIENT
Start: 2021-07-20 | End: 2021-07-20

## 2021-07-20 RX ORDER — ACETAMINOPHEN 325 MG/1
650 TABLET ORAL ONCE
Status: COMPLETED | OUTPATIENT
Start: 2021-07-20 | End: 2021-07-20

## 2021-07-20 RX ORDER — METHYLPREDNISOLONE SODIUM SUCCINATE 125 MG/2ML
125 INJECTION, POWDER, LYOPHILIZED, FOR SOLUTION INTRAMUSCULAR; INTRAVENOUS ONCE
Status: CANCELLED | OUTPATIENT
Start: 2021-07-20 | End: 2021-07-20

## 2021-07-20 RX ORDER — SODIUM CHLORIDE 9 MG/ML
INJECTION, SOLUTION INTRAVENOUS CONTINUOUS
Status: CANCELLED | OUTPATIENT
Start: 2021-07-20

## 2021-07-20 RX ORDER — DIPHENHYDRAMINE HCL 25 MG
25 TABLET ORAL ONCE
Status: COMPLETED | OUTPATIENT
Start: 2021-07-20 | End: 2021-07-20

## 2021-07-20 RX ORDER — ACETAMINOPHEN 325 MG/1
650 TABLET ORAL ONCE
Status: CANCELLED | OUTPATIENT
Start: 2021-07-20 | End: 2021-07-20

## 2021-07-20 RX ADMIN — IMMUNE GLOBULIN INFUSION (HUMAN) 10 G: 100 INJECTION, SOLUTION INTRAVENOUS; SUBCUTANEOUS at 13:10

## 2021-07-20 RX ADMIN — IMMUNE GLOBULIN INFUSION (HUMAN) 20 G: 100 INJECTION, SOLUTION INTRAVENOUS; SUBCUTANEOUS at 14:18

## 2021-07-20 RX ADMIN — IMMUNE GLOBULIN INFUSION (HUMAN) 20 G: 100 INJECTION, SOLUTION INTRAVENOUS; SUBCUTANEOUS at 15:46

## 2021-07-20 RX ADMIN — DIPHENHYDRAMINE HCL 25 MG: 25 TABLET ORAL at 12:55

## 2021-07-20 RX ADMIN — ACETAMINOPHEN 650 MG: 325 TABLET ORAL at 12:55

## 2021-07-20 ASSESSMENT — PAIN SCALES - GENERAL
PAINLEVEL_OUTOF10: 0

## 2021-07-20 NOTE — PROGRESS NOTES
recent illness or infection? No    Any fever, chilling or night sweats? No    Dosage: Today's dose is 50 Grams. Patient gets a total dose of 100 grams divided in 2 doses/over 2 days. BP (!) 158/73   Pulse 67   Temp 97.9 °F (36.6 °C) (Oral)   Resp 18   SpO2 97%       CMP:    Lab Results   Component Value Date     07/14/2014    K 4.4 07/14/2014    CL 92 07/14/2014    CO2 29 07/14/2014    BUN 15 07/14/2014    PROT 7.2 06/24/2014    PROT 7.3 03/12/2013                                  Monitoring of vital signs and rate changes are documented in flow sheets. Patient's blood pressure was slightly elevated upon arrival but became lower and more stable after eating and taking Metoprolol. Education: Verbal and written discharge instructions reviewed with patient. Verbalized understanding. Written copy given via AVS    Response to treatment:  Well tolerated by patient. Patient to call and schedule next appointment.     Electronically signed by Ethan Rebolledo RN on 7/20/2021 at 4:47 PM

## 2021-11-18 RX ORDER — ACETAMINOPHEN 325 MG/1
650 TABLET ORAL ONCE
Status: CANCELLED | OUTPATIENT
Start: 2021-12-06 | End: 2021-12-06

## 2021-11-18 RX ORDER — ALBUTEROL SULFATE 90 UG/1
4 AEROSOL, METERED RESPIRATORY (INHALATION) PRN
Status: CANCELLED | OUTPATIENT
Start: 2021-12-06

## 2021-11-18 RX ORDER — EPINEPHRINE 1 MG/ML
0.3 INJECTION, SOLUTION, CONCENTRATE INTRAVENOUS PRN
Status: CANCELLED | OUTPATIENT
Start: 2021-12-06

## 2021-11-18 RX ORDER — SODIUM CHLORIDE 0.9 % (FLUSH) 0.9 %
5-40 SYRINGE (ML) INJECTION PRN
Status: CANCELLED | OUTPATIENT
Start: 2021-12-06

## 2021-11-18 RX ORDER — DIPHENHYDRAMINE HCL 25 MG
25 TABLET ORAL ONCE
Status: CANCELLED | OUTPATIENT
Start: 2021-12-06 | End: 2021-12-06

## 2021-11-18 RX ORDER — ONDANSETRON 2 MG/ML
8 INJECTION INTRAMUSCULAR; INTRAVENOUS
Status: CANCELLED | OUTPATIENT
Start: 2021-12-06

## 2021-11-18 RX ORDER — DIPHENHYDRAMINE HYDROCHLORIDE 50 MG/ML
50 INJECTION INTRAMUSCULAR; INTRAVENOUS
Status: CANCELLED | OUTPATIENT
Start: 2021-12-06

## 2021-11-18 RX ORDER — ACETAMINOPHEN 325 MG/1
650 TABLET ORAL
Status: CANCELLED | OUTPATIENT
Start: 2021-12-06

## 2021-11-18 RX ORDER — SODIUM CHLORIDE 9 MG/ML
INJECTION, SOLUTION INTRAVENOUS CONTINUOUS
Status: CANCELLED | OUTPATIENT
Start: 2021-12-06

## 2021-12-13 ENCOUNTER — HOSPITAL ENCOUNTER (OUTPATIENT)
Dept: INFUSION THERAPY | Age: 66
Setting detail: INFUSION SERIES
Discharge: HOME | End: 2021-12-13
Payer: MEDICARE

## 2021-12-13 VITALS
OXYGEN SATURATION: 95 % | HEART RATE: 62 BPM | RESPIRATION RATE: 18 BRPM | DIASTOLIC BLOOD PRESSURE: 62 MMHG | TEMPERATURE: 97.9 F | SYSTOLIC BLOOD PRESSURE: 107 MMHG

## 2021-12-13 DIAGNOSIS — G61.81 CHRONIC INFLAMMATORY DEMYELINATING POLYNEURITIS (HCC): Primary | ICD-10-CM

## 2021-12-13 PROCEDURE — 6370000000 HC RX 637 (ALT 250 FOR IP): Performed by: PSYCHIATRY & NEUROLOGY

## 2021-12-13 PROCEDURE — 96366 THER/PROPH/DIAG IV INF ADDON: CPT

## 2021-12-13 PROCEDURE — 96365 THER/PROPH/DIAG IV INF INIT: CPT

## 2021-12-13 PROCEDURE — 6360000002 HC RX W HCPCS: Performed by: PSYCHIATRY & NEUROLOGY

## 2021-12-13 RX ORDER — ACETAMINOPHEN 325 MG/1
650 TABLET ORAL ONCE
Status: CANCELLED | OUTPATIENT
Start: 2021-12-14 | End: 2021-12-14

## 2021-12-13 RX ORDER — DIPHENHYDRAMINE HCL 25 MG
25 TABLET ORAL ONCE
Status: COMPLETED | OUTPATIENT
Start: 2021-12-13 | End: 2021-12-13

## 2021-12-13 RX ORDER — AMLODIPINE AND OLMESARTAN MEDOXOMIL 10; 40 MG/1; MG/1
1 TABLET ORAL DAILY
COMMUNITY

## 2021-12-13 RX ORDER — ONDANSETRON 2 MG/ML
8 INJECTION INTRAMUSCULAR; INTRAVENOUS
Status: CANCELLED | OUTPATIENT
Start: 2021-12-14

## 2021-12-13 RX ORDER — DIPHENHYDRAMINE HYDROCHLORIDE 50 MG/ML
50 INJECTION INTRAMUSCULAR; INTRAVENOUS
Status: CANCELLED | OUTPATIENT
Start: 2021-12-14

## 2021-12-13 RX ORDER — SODIUM CHLORIDE 0.9 % (FLUSH) 0.9 %
5-40 SYRINGE (ML) INJECTION PRN
Status: CANCELLED | OUTPATIENT
Start: 2021-12-14

## 2021-12-13 RX ORDER — ALBUTEROL SULFATE 90 UG/1
4 AEROSOL, METERED RESPIRATORY (INHALATION) PRN
Status: CANCELLED | OUTPATIENT
Start: 2021-12-14

## 2021-12-13 RX ORDER — EPINEPHRINE 1 MG/ML
0.3 INJECTION, SOLUTION, CONCENTRATE INTRAVENOUS PRN
Status: CANCELLED | OUTPATIENT
Start: 2021-12-14

## 2021-12-13 RX ORDER — SODIUM CHLORIDE 0.9 % (FLUSH) 0.9 %
5-40 SYRINGE (ML) INJECTION PRN
Status: DISCONTINUED | OUTPATIENT
Start: 2021-12-13 | End: 2021-12-14 | Stop reason: HOSPADM

## 2021-12-13 RX ORDER — FUROSEMIDE 20 MG/1
20 TABLET ORAL DAILY
COMMUNITY

## 2021-12-13 RX ORDER — SODIUM CHLORIDE 9 MG/ML
INJECTION, SOLUTION INTRAVENOUS CONTINUOUS
Status: CANCELLED | OUTPATIENT
Start: 2021-12-14

## 2021-12-13 RX ORDER — DIPHENHYDRAMINE HCL 25 MG
25 TABLET ORAL ONCE
Status: CANCELLED | OUTPATIENT
Start: 2021-12-14 | End: 2021-12-14

## 2021-12-13 RX ORDER — ACETAMINOPHEN 325 MG/1
650 TABLET ORAL ONCE
Status: COMPLETED | OUTPATIENT
Start: 2021-12-13 | End: 2021-12-13

## 2021-12-13 RX ORDER — ACETAMINOPHEN 325 MG/1
650 TABLET ORAL
Status: CANCELLED | OUTPATIENT
Start: 2021-12-14

## 2021-12-13 RX ADMIN — IMMUNE GLOBULIN INFUSION (HUMAN) 20 G: 100 INJECTION, SOLUTION INTRAVENOUS; SUBCUTANEOUS at 15:50

## 2021-12-13 RX ADMIN — ACETAMINOPHEN 650 MG: 325 TABLET ORAL at 11:43

## 2021-12-13 RX ADMIN — DIPHENHYDRAMINE HCL 25 MG: 25 TABLET ORAL at 11:44

## 2021-12-13 RX ADMIN — IMMUNE GLOBULIN INFUSION (HUMAN) 20 G: 100 INJECTION, SOLUTION INTRAVENOUS; SUBCUTANEOUS at 13:46

## 2021-12-13 RX ADMIN — IMMUNE GLOBULIN INFUSION (HUMAN) 10 G: 100 INJECTION, SOLUTION INTRAVENOUS; SUBCUTANEOUS at 12:35

## 2021-12-13 ASSESSMENT — PAIN SCALES - GENERAL: PAINLEVEL_OUTOF10: 0

## 2021-12-14 ENCOUNTER — HOSPITAL ENCOUNTER (OUTPATIENT)
Dept: INFUSION THERAPY | Age: 66
Setting detail: INFUSION SERIES
Discharge: HOME | End: 2021-12-14
Payer: MEDICARE

## 2021-12-14 VITALS
TEMPERATURE: 98.1 F | OXYGEN SATURATION: 97 % | HEART RATE: 71 BPM | DIASTOLIC BLOOD PRESSURE: 68 MMHG | RESPIRATION RATE: 18 BRPM | SYSTOLIC BLOOD PRESSURE: 109 MMHG

## 2021-12-14 DIAGNOSIS — G61.81 CHRONIC INFLAMMATORY DEMYELINATING POLYNEURITIS (HCC): Primary | ICD-10-CM

## 2021-12-14 PROCEDURE — 96366 THER/PROPH/DIAG IV INF ADDON: CPT

## 2021-12-14 PROCEDURE — 2580000003 HC RX 258: Performed by: PSYCHIATRY & NEUROLOGY

## 2021-12-14 PROCEDURE — 6370000000 HC RX 637 (ALT 250 FOR IP): Performed by: PSYCHIATRY & NEUROLOGY

## 2021-12-14 PROCEDURE — 96365 THER/PROPH/DIAG IV INF INIT: CPT

## 2021-12-14 PROCEDURE — 6360000002 HC RX W HCPCS: Performed by: PSYCHIATRY & NEUROLOGY

## 2021-12-14 RX ORDER — DIPHENHYDRAMINE HYDROCHLORIDE 50 MG/ML
50 INJECTION INTRAMUSCULAR; INTRAVENOUS
Status: CANCELLED | OUTPATIENT
Start: 2021-12-14

## 2021-12-14 RX ORDER — ACETAMINOPHEN 325 MG/1
650 TABLET ORAL
Status: CANCELLED | OUTPATIENT
Start: 2021-12-14

## 2021-12-14 RX ORDER — SODIUM CHLORIDE 0.9 % (FLUSH) 0.9 %
5-40 SYRINGE (ML) INJECTION PRN
Status: DISCONTINUED | OUTPATIENT
Start: 2021-12-14 | End: 2021-12-15 | Stop reason: HOSPADM

## 2021-12-14 RX ORDER — ALBUTEROL SULFATE 90 UG/1
4 AEROSOL, METERED RESPIRATORY (INHALATION) PRN
Status: CANCELLED | OUTPATIENT
Start: 2021-12-14

## 2021-12-14 RX ORDER — DIPHENHYDRAMINE HCL 25 MG
25 TABLET ORAL ONCE
Status: COMPLETED | OUTPATIENT
Start: 2021-12-14 | End: 2021-12-14

## 2021-12-14 RX ORDER — SODIUM CHLORIDE 0.9 % (FLUSH) 0.9 %
5-40 SYRINGE (ML) INJECTION PRN
Status: CANCELLED | OUTPATIENT
Start: 2021-12-14

## 2021-12-14 RX ORDER — SODIUM CHLORIDE 9 MG/ML
INJECTION, SOLUTION INTRAVENOUS CONTINUOUS
Status: CANCELLED | OUTPATIENT
Start: 2021-12-14

## 2021-12-14 RX ORDER — EPINEPHRINE 1 MG/ML
0.3 INJECTION, SOLUTION, CONCENTRATE INTRAVENOUS PRN
Status: CANCELLED | OUTPATIENT
Start: 2021-12-14

## 2021-12-14 RX ORDER — ACETAMINOPHEN 325 MG/1
650 TABLET ORAL ONCE
Status: CANCELLED | OUTPATIENT
Start: 2021-12-14 | End: 2021-12-14

## 2021-12-14 RX ORDER — DIPHENHYDRAMINE HCL 25 MG
25 TABLET ORAL ONCE
Status: CANCELLED | OUTPATIENT
Start: 2021-12-14 | End: 2021-12-14

## 2021-12-14 RX ORDER — ACETAMINOPHEN 325 MG/1
650 TABLET ORAL ONCE
Status: COMPLETED | OUTPATIENT
Start: 2021-12-14 | End: 2021-12-14

## 2021-12-14 RX ORDER — ONDANSETRON 2 MG/ML
8 INJECTION INTRAMUSCULAR; INTRAVENOUS
Status: CANCELLED | OUTPATIENT
Start: 2021-12-14

## 2021-12-14 RX ADMIN — IMMUNE GLOBULIN INFUSION (HUMAN) 10 G: 100 INJECTION, SOLUTION INTRAVENOUS; SUBCUTANEOUS at 13:09

## 2021-12-14 RX ADMIN — SODIUM CHLORIDE, PRESERVATIVE FREE 10 ML: 5 INJECTION INTRAVENOUS at 17:49

## 2021-12-14 RX ADMIN — SODIUM CHLORIDE, PRESERVATIVE FREE 10 ML: 5 INJECTION INTRAVENOUS at 17:42

## 2021-12-14 RX ADMIN — DIPHENHYDRAMINE HCL 25 MG: 25 TABLET ORAL at 11:52

## 2021-12-14 RX ADMIN — ACETAMINOPHEN 650 MG: 325 TABLET ORAL at 11:52

## 2021-12-14 ASSESSMENT — PAIN SCALES - GENERAL
PAINLEVEL_OUTOF10: 0
PAINLEVEL_OUTOF10: 0

## 2021-12-14 NOTE — PROGRESS NOTES
Outpatient [address] [facility]    IVIG Infusion    NAME:  Merly Casillas OF BIRTH:  1955  MEDICAL RECORD NUMBER:  [id number]  DATE:  12/13/2021    Patient arrived to [facility] 58   [] per wheelchair   [x] ambulatory  Using walker    Patient with CIDP for several years after history of Carmen Spoon. Has neuropathy in legs with pain and weakness. Decreased mobility. Had gastric bypass in 2019 and has lost over 100 lbs which has helped a little with mon=bility. Still uses a walker and unable to do steps. Is the patient experiencing any:    Fatigue:   []   None  [x]   Increase over baseline but not altering normal activities  []   Moderate of causing difficulty performing some activities  []   Severe or loss of ability to perform some activities  []   Bedridden or disabling    Dizziness or Lightheadedness:   []   None  [x]   No Interference  []   Interferes with functioning but not activities of daily living  []   Interferes with daily activies  []   Bedridden or disabling    Shortness of Breath:   []   None   [x]   Dyspneic on exertion   []   Dyspnea with normal activities  []   Dyspnea at rest    Are you on antibiotics presently? No    Any recent illness or infection? No    Any fever, chilling or night sweats? No    Dosage: 50 Grams     /62   Pulse 62   Temp 97.9 °F (36.6 °C) (Oral)   Resp 18   SpO2 95%       CMP:  Done 12/8/21      Monitoring of vital signs and rate changes are documented in flow sheets. Patient tolerated IVIG without any complications. VSS. Response to treatment:  Well tolerated by patient. Patient to return tomorrow for another 50 grams of IVIG.       Electronically signed by Casi Farrar RN on 12/13/2021 at 7:08 PM

## 2022-02-02 NOTE — ADDENDUM NOTE
Encounter addended by: Jackson Dalton MA on: 2/2/2022 1:30 PM   Actions taken: Document created, Document edited

## 2022-02-21 RX ORDER — ACETAMINOPHEN 325 MG/1
650 TABLET ORAL ONCE
Status: CANCELLED | OUTPATIENT
Start: 2022-03-10 | End: 2022-03-10

## 2022-02-21 RX ORDER — ONDANSETRON 2 MG/ML
8 INJECTION INTRAMUSCULAR; INTRAVENOUS
Status: CANCELLED | OUTPATIENT
Start: 2022-03-10

## 2022-02-21 RX ORDER — SODIUM CHLORIDE 0.9 % (FLUSH) 0.9 %
5-40 SYRINGE (ML) INJECTION PRN
Status: CANCELLED | OUTPATIENT
Start: 2022-03-10

## 2022-02-21 RX ORDER — MEPERIDINE HYDROCHLORIDE 25 MG/ML
12.5 INJECTION INTRAMUSCULAR; INTRAVENOUS; SUBCUTANEOUS PRN
Status: CANCELLED | OUTPATIENT
Start: 2022-03-10

## 2022-02-21 RX ORDER — EPINEPHRINE 1 MG/ML
0.3 INJECTION, SOLUTION, CONCENTRATE INTRAVENOUS PRN
Status: CANCELLED | OUTPATIENT
Start: 2022-03-10

## 2022-02-21 RX ORDER — ALBUTEROL SULFATE 90 UG/1
4 AEROSOL, METERED RESPIRATORY (INHALATION) PRN
Status: CANCELLED | OUTPATIENT
Start: 2022-03-10

## 2022-02-21 RX ORDER — DIPHENHYDRAMINE HYDROCHLORIDE 50 MG/ML
50 INJECTION INTRAMUSCULAR; INTRAVENOUS
Status: CANCELLED | OUTPATIENT
Start: 2022-03-10

## 2022-02-21 RX ORDER — DIPHENHYDRAMINE HCL 25 MG
25 TABLET ORAL ONCE
Status: CANCELLED | OUTPATIENT
Start: 2022-03-10 | End: 2022-03-10

## 2022-02-21 RX ORDER — SODIUM CHLORIDE 9 MG/ML
INJECTION, SOLUTION INTRAVENOUS CONTINUOUS
Status: CANCELLED | OUTPATIENT
Start: 2022-03-10

## 2022-02-21 RX ORDER — ACETAMINOPHEN 325 MG/1
650 TABLET ORAL
Status: CANCELLED | OUTPATIENT
Start: 2022-03-10

## 2022-03-10 ENCOUNTER — HOSPITAL ENCOUNTER (OUTPATIENT)
Dept: INFUSION THERAPY | Age: 67
Setting detail: INFUSION SERIES
End: 2022-03-10

## 2022-03-14 ENCOUNTER — HOSPITAL ENCOUNTER (OUTPATIENT)
Dept: INFUSION THERAPY | Age: 67
Setting detail: INFUSION SERIES
Discharge: HOME | End: 2022-03-14
Payer: MEDICARE

## 2022-03-14 VITALS
HEART RATE: 63 BPM | DIASTOLIC BLOOD PRESSURE: 75 MMHG | RESPIRATION RATE: 18 BRPM | TEMPERATURE: 97 F | OXYGEN SATURATION: 98 % | SYSTOLIC BLOOD PRESSURE: 136 MMHG

## 2022-03-14 DIAGNOSIS — G61.81 CHRONIC INFLAMMATORY DEMYELINATING POLYNEURITIS (HCC): Primary | ICD-10-CM

## 2022-03-14 PROCEDURE — 96365 THER/PROPH/DIAG IV INF INIT: CPT

## 2022-03-14 PROCEDURE — 96366 THER/PROPH/DIAG IV INF ADDON: CPT

## 2022-03-14 PROCEDURE — 6370000000 HC RX 637 (ALT 250 FOR IP): Performed by: PSYCHIATRY & NEUROLOGY

## 2022-03-14 PROCEDURE — 6360000002 HC RX W HCPCS: Performed by: PSYCHIATRY & NEUROLOGY

## 2022-03-14 RX ORDER — DIPHENHYDRAMINE HYDROCHLORIDE 50 MG/ML
50 INJECTION INTRAMUSCULAR; INTRAVENOUS
OUTPATIENT
Start: 2022-03-15

## 2022-03-14 RX ORDER — SODIUM CHLORIDE 9 MG/ML
INJECTION, SOLUTION INTRAVENOUS CONTINUOUS
OUTPATIENT
Start: 2022-03-15

## 2022-03-14 RX ORDER — ACETAMINOPHEN 325 MG/1
650 TABLET ORAL
OUTPATIENT
Start: 2022-03-15

## 2022-03-14 RX ORDER — ACETAMINOPHEN 325 MG/1
650 TABLET ORAL ONCE
Status: CANCELLED | OUTPATIENT
Start: 2022-03-15 | End: 2022-03-15

## 2022-03-14 RX ORDER — ONDANSETRON 2 MG/ML
8 INJECTION INTRAMUSCULAR; INTRAVENOUS
OUTPATIENT
Start: 2022-03-15

## 2022-03-14 RX ORDER — DIPHENHYDRAMINE HCL 25 MG
25 TABLET ORAL ONCE
Status: COMPLETED | OUTPATIENT
Start: 2022-03-14 | End: 2022-03-14

## 2022-03-14 RX ORDER — EPINEPHRINE 1 MG/ML
0.3 INJECTION, SOLUTION, CONCENTRATE INTRAVENOUS PRN
OUTPATIENT
Start: 2022-03-15

## 2022-03-14 RX ORDER — MEPERIDINE HYDROCHLORIDE 25 MG/ML
12.5 INJECTION INTRAMUSCULAR; INTRAVENOUS; SUBCUTANEOUS PRN
OUTPATIENT
Start: 2022-03-15

## 2022-03-14 RX ORDER — DIPHENHYDRAMINE HCL 25 MG
25 TABLET ORAL ONCE
Status: CANCELLED | OUTPATIENT
Start: 2022-03-15 | End: 2022-03-15

## 2022-03-14 RX ORDER — ALBUTEROL SULFATE 90 UG/1
4 AEROSOL, METERED RESPIRATORY (INHALATION) PRN
OUTPATIENT
Start: 2022-03-15

## 2022-03-14 RX ORDER — SODIUM CHLORIDE 0.9 % (FLUSH) 0.9 %
5-40 SYRINGE (ML) INJECTION PRN
Status: DISCONTINUED | OUTPATIENT
Start: 2022-03-14 | End: 2022-03-15 | Stop reason: HOSPADM

## 2022-03-14 RX ORDER — SODIUM CHLORIDE 0.9 % (FLUSH) 0.9 %
5-40 SYRINGE (ML) INJECTION PRN
Status: CANCELLED | OUTPATIENT
Start: 2022-03-15

## 2022-03-14 RX ORDER — ACETAMINOPHEN 325 MG/1
650 TABLET ORAL ONCE
Status: COMPLETED | OUTPATIENT
Start: 2022-03-14 | End: 2022-03-14

## 2022-03-14 RX ADMIN — DIPHENHYDRAMINE HCL 25 MG: 25 TABLET ORAL at 09:10

## 2022-03-14 RX ADMIN — ACETAMINOPHEN 650 MG: 325 TABLET ORAL at 09:10

## 2022-03-14 RX ADMIN — IMMUNE GLOBULIN INFUSION (HUMAN) 30 G: 100 INJECTION, SOLUTION INTRAVENOUS; SUBCUTANEOUS at 12:37

## 2022-03-14 RX ADMIN — IMMUNE GLOBULIN INFUSION (HUMAN) 20 G: 100 INJECTION, SOLUTION INTRAVENOUS; SUBCUTANEOUS at 10:00

## 2022-03-14 ASSESSMENT — PAIN SCALES - GENERAL
PAINLEVEL_OUTOF10: 0
PAINLEVEL_OUTOF10: 0

## 2022-03-14 NOTE — PROGRESS NOTES
Outpatient [address] [facility]    IVIG Infusion    NAME:  Lynn Bullard OF BIRTH:  1955  MEDICAL RECORD NUMBER:  [id number]  DATE:  3/14/2022    Patient arrived to [facility] 58   [] per wheelchair   [x] ambulatory  USING WALKER     Patient with CIDP for several years after history of [place]. Has neuropathy in legs with pain and weakness. Decreased mobility. Had gastric bypass in 2019 and has lost over 100 lbs which has helped a little with mon=bility. Still uses a walker and unable to do steps. Is the patient experiencing any:    Fatigue:   []   None  [x]   Increase over baseline but not altering normal activities  []   Moderate of causing difficulty performing some activities  []   Severe or loss of ability to perform some activities  []   Bedridden or disabling    Dizziness or Lightheadedness:   [x]   None  []   No Interference  []   Interferes with functioning but not activities of daily living  []   Interferes with daily activies  []   Bedridden or disabling    Shortness of Breath:   [x]   None   []   Dyspneic on exertion   []   Dyspnea with normal activities  []   Dyspnea at rest    Are you on antibiotics presently? No    Any recent illness or infection? No    Any fever, chilling or night sweats? No    Dosage: 50 Grams     BP (!) 148/76   Pulse 72   Temp 97.8 °F (36.6 °C) (Oral)   Resp 16   SpO2 96%       BMP (Na,K,Cl,CO2,Glu,BUN,Creat,Ca) Drawn 1/10/22  Specimen:  Whole blood    Ref Range & Units 2 mo ago Comments   BLD UREA NITROGEN 8 - 26 mg/dL 17     SODIUM 135 - 145 mEq/L 131 Low      POTASSIUM 3.6 - 5.1 mEq/L 3.6     CHLORIDE 98 - 111 mEq/L 98     CO2 21 - 31 mmol/L 31     GLUCOSE, RANDOM 70 - 99 mg/dL 92     CREATININE 0.60 - 1.20 mg/dL 0.67     ANION GAP 6 - 18 mmol/L 2 Low      CALCIUM 8.5 - 10.4 mg/dL 10.1     ESTIMATED GFR >59 mL/min/1.73 m2 95              Monitoring of vital signs and rate changes are documented in flow sheets. Response to treatment:  Well tolerated by patient.       Electronically signed by Zac Talbert RN on 3/14/2022 at 5:15 PM.

## 2022-03-15 ENCOUNTER — HOSPITAL ENCOUNTER (OUTPATIENT)
Dept: INFUSION THERAPY | Age: 67
Setting detail: INFUSION SERIES
Discharge: HOME | End: 2022-03-15
Payer: MEDICARE

## 2022-03-15 VITALS
DIASTOLIC BLOOD PRESSURE: 69 MMHG | SYSTOLIC BLOOD PRESSURE: 112 MMHG | HEART RATE: 61 BPM | TEMPERATURE: 97.6 F | RESPIRATION RATE: 17 BRPM | OXYGEN SATURATION: 96 %

## 2022-03-15 DIAGNOSIS — G61.81 CHRONIC INFLAMMATORY DEMYELINATING POLYNEURITIS (HCC): Primary | ICD-10-CM

## 2022-03-15 PROCEDURE — 96365 THER/PROPH/DIAG IV INF INIT: CPT

## 2022-03-15 PROCEDURE — 2580000003 HC RX 258: Performed by: PSYCHIATRY & NEUROLOGY

## 2022-03-15 PROCEDURE — 6360000002 HC RX W HCPCS: Performed by: PSYCHIATRY & NEUROLOGY

## 2022-03-15 PROCEDURE — 6370000000 HC RX 637 (ALT 250 FOR IP): Performed by: PSYCHIATRY & NEUROLOGY

## 2022-03-15 PROCEDURE — 96366 THER/PROPH/DIAG IV INF ADDON: CPT

## 2022-03-15 RX ORDER — DIPHENHYDRAMINE HCL 25 MG
25 TABLET ORAL ONCE
OUTPATIENT
Start: 2022-03-15 | End: 2022-03-15

## 2022-03-15 RX ORDER — ACETAMINOPHEN 325 MG/1
650 TABLET ORAL
OUTPATIENT
Start: 2022-03-15

## 2022-03-15 RX ORDER — DIPHENHYDRAMINE HCL 25 MG
25 TABLET ORAL ONCE
Status: COMPLETED | OUTPATIENT
Start: 2022-03-15 | End: 2022-03-15

## 2022-03-15 RX ORDER — ONDANSETRON 2 MG/ML
8 INJECTION INTRAMUSCULAR; INTRAVENOUS
OUTPATIENT
Start: 2022-03-15

## 2022-03-15 RX ORDER — ACETAMINOPHEN 325 MG/1
650 TABLET ORAL ONCE
Status: COMPLETED | OUTPATIENT
Start: 2022-03-15 | End: 2022-03-15

## 2022-03-15 RX ORDER — EPINEPHRINE 1 MG/ML
0.3 INJECTION, SOLUTION, CONCENTRATE INTRAVENOUS PRN
OUTPATIENT
Start: 2022-03-15

## 2022-03-15 RX ORDER — ACETAMINOPHEN 325 MG/1
650 TABLET ORAL ONCE
OUTPATIENT
Start: 2022-03-15 | End: 2022-03-15

## 2022-03-15 RX ORDER — MEPERIDINE HYDROCHLORIDE 25 MG/ML
12.5 INJECTION INTRAMUSCULAR; INTRAVENOUS; SUBCUTANEOUS PRN
OUTPATIENT
Start: 2022-03-15

## 2022-03-15 RX ORDER — SODIUM CHLORIDE 0.9 % (FLUSH) 0.9 %
5-40 SYRINGE (ML) INJECTION PRN
Status: DISCONTINUED | OUTPATIENT
Start: 2022-03-15 | End: 2022-03-16 | Stop reason: HOSPADM

## 2022-03-15 RX ORDER — SODIUM CHLORIDE 9 MG/ML
INJECTION, SOLUTION INTRAVENOUS CONTINUOUS
OUTPATIENT
Start: 2022-03-15

## 2022-03-15 RX ORDER — DIPHENHYDRAMINE HYDROCHLORIDE 50 MG/ML
50 INJECTION INTRAMUSCULAR; INTRAVENOUS
OUTPATIENT
Start: 2022-03-15

## 2022-03-15 RX ORDER — SODIUM CHLORIDE 0.9 % (FLUSH) 0.9 %
5-40 SYRINGE (ML) INJECTION PRN
OUTPATIENT
Start: 2022-03-15

## 2022-03-15 RX ORDER — ALBUTEROL SULFATE 90 UG/1
4 AEROSOL, METERED RESPIRATORY (INHALATION) PRN
OUTPATIENT
Start: 2022-03-15

## 2022-03-15 RX ADMIN — IMMUNE GLOBULIN INFUSION (HUMAN) 20 G: 100 INJECTION, SOLUTION INTRAVENOUS; SUBCUTANEOUS at 12:08

## 2022-03-15 RX ADMIN — IMMUNE GLOBULIN INFUSION (HUMAN) 30 G: 100 INJECTION, SOLUTION INTRAVENOUS; SUBCUTANEOUS at 09:11

## 2022-03-15 RX ADMIN — DIPHENHYDRAMINE HCL 25 MG: 25 TABLET ORAL at 08:54

## 2022-03-15 RX ADMIN — ACETAMINOPHEN 650 MG: 325 TABLET ORAL at 08:54

## 2022-03-15 RX ADMIN — SODIUM CHLORIDE, PRESERVATIVE FREE 10 ML: 5 INJECTION INTRAVENOUS at 14:02

## 2022-03-15 ASSESSMENT — PAIN SCALES - GENERAL
PAINLEVEL_OUTOF10: 0
PAINLEVEL_OUTOF10: 0

## 2022-03-15 NOTE — PROGRESS NOTES
Outpatient [address] [facility]    IVIG Infusion    NAME:  Angeles Emery OF BIRTH:  1955  MEDICAL RECORD NUMBER:  [id number]  DATE:  3/15/2022    Patient arrived to [facility] 58   [] per wheelchair   [x] ambulatory      Is the patient experiencing any:    Fatigue:   []   None  []   Increase over baseline but not altering normal activities  [x]   Moderate of causing difficulty performing some activities  []   Severe or loss of ability to perform some activities  []   Bedridden or disabling    Dizziness or Lightheadedness:   [x]   None  []   No Interference  []   Interferes with functioning but not activities of daily living  []   Interferes with daily activies  []   Bedridden or disabling    Shortness of Breath:   [x]   None   []   Dyspneic on exertion   []   Dyspnea with normal activities  []   Dyspnea at rest    Are you on antibiotics presently? No    Any recent illness or infection? No    Any fever, chilling or night sweats? No    Dosage: 50 Grams     /69   Pulse 61   Temp 97.6 °F (36.4 °C) (Oral)   Resp 17   SpO2 96%       CMP:    Lab Results   Component Value Date     07/14/2014    K 4.4 07/14/2014    CL 92 07/14/2014    CO2 29 07/14/2014    BUN 15 07/14/2014    PROT 7.2 06/24/2014    PROT 7.3 03/12/2013                         Monitoring of vital signs and rate changes are documented in flow sheets. Response to treatment:  Well tolerated by patient.       Electronically signed by Alanna Valdez RN on 3/15/2022 at 2:08 PM

## 2023-07-27 RX ORDER — SODIUM CHLORIDE 9 MG/ML
INJECTION, SOLUTION INTRAVENOUS CONTINUOUS
OUTPATIENT
Start: 2023-08-03

## 2023-07-27 RX ORDER — ONDANSETRON 2 MG/ML
8 INJECTION INTRAMUSCULAR; INTRAVENOUS
OUTPATIENT
Start: 2023-08-03

## 2023-07-27 RX ORDER — DIPHENHYDRAMINE HCL 25 MG
25 TABLET ORAL ONCE
OUTPATIENT
Start: 2023-08-03 | End: 2023-08-03

## 2023-07-27 RX ORDER — ACETAMINOPHEN 325 MG/1
650 TABLET ORAL
OUTPATIENT
Start: 2023-08-03

## 2023-07-27 RX ORDER — ACETAMINOPHEN 325 MG/1
650 TABLET ORAL ONCE
OUTPATIENT
Start: 2023-08-03 | End: 2023-08-03

## 2023-07-27 RX ORDER — EPINEPHRINE 1 MG/ML
0.3 INJECTION, SOLUTION, CONCENTRATE INTRAVENOUS PRN
OUTPATIENT
Start: 2023-08-03

## 2023-07-27 RX ORDER — DIPHENHYDRAMINE HYDROCHLORIDE 50 MG/ML
50 INJECTION INTRAMUSCULAR; INTRAVENOUS
OUTPATIENT
Start: 2023-08-03

## 2023-07-27 RX ORDER — ALBUTEROL SULFATE 90 UG/1
4 AEROSOL, METERED RESPIRATORY (INHALATION) PRN
OUTPATIENT
Start: 2023-08-03

## 2023-07-27 RX ORDER — MEPERIDINE HYDROCHLORIDE 25 MG/ML
12.5 INJECTION INTRAMUSCULAR; INTRAVENOUS; SUBCUTANEOUS PRN
OUTPATIENT
Start: 2023-08-03

## 2023-07-27 RX ORDER — SODIUM CHLORIDE 0.9 % (FLUSH) 0.9 %
5-40 SYRINGE (ML) INJECTION PRN
OUTPATIENT
Start: 2023-08-03

## 2023-08-14 ENCOUNTER — HOSPITAL ENCOUNTER (OUTPATIENT)
Dept: INFUSION THERAPY | Age: 68
Setting detail: INFUSION SERIES
Discharge: HOME | End: 2023-08-14

## 2023-08-14 NOTE — PROGRESS NOTES
Outpatient [address] [facility]        NAME:  Mariaelena Andrews OF BIRTH:  1955  MEDICAL RECORD NUMBER:  [id number]  DATE:  8/14/2023         Seferino Jones has been a patient of this [address] [place] Since around 2008 and she has been getting infrequent IVIG infusions which have helped with her symptoms of Chronic Inflammatory Demyelinating Polyneuropathy. She has been suffering with this condition for quite a few years and IVIG treatments do help her feel stronger. She has suffered foot drop. 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 She is unable to afford these infusions on a regular basis and sometimes can get them 2-3 times a year. She lives alone and had to get an elevator in her home because she is unable to go up steps. She has also had to give up driving. She has had multiple other brands of IVIG with side effects. January 2012 she received Gammaplex which not only didn't help but caused some reactions. In December of 2019 we gave her Gammunex-C and she ended up in ER 3 different times with spreading systemic rash all over. There was another time she tried Privigen but it did not work and she had some problems afterward with kidney function problems. The Gammagard liquid was the only brand that helped her weakness and symptoms and she was able to tolerate without any complications. She has been getting this for quite a while and Neurologist always orders this a ELENA. Jenniffer Li has over the years authorized this in the past. The patient is fearful to try other brands due to all the complications she has had in past and the Gammagard liquid works for her without any side effects.                              Respectfully submitted,                           Yoko Durham                           Clinical Coordinator for [address] [place] [address] ORTHOPEDIC AND SPINE \[facility] [place] [facility]\"" AT [place]                           Phone [phone] [email or web address]

## 2023-08-15 NOTE — PROGRESS NOTES
Outpatient [address] [facility]        NAME:  Danni Elmore OF BIRTH:  1955  MEDICAL RECORD NUMBER:  [id number]  DATE:  8/14/2023         Alee Van has been a patient of this [address] [place] Since around 2008 and she has been getting infrequent IVIG infusions which have helped with her symptoms of Chronic Inflammatory Demyelinating Polyneuropathy. She has been suffering with this condition for quite a few years and IVIG treatments do help her feel stronger. She has suffered foot drop. 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 She is unable to afford these infusions on a regular basis and sometimes can get them 2-3 times a year. She lives alone and had to get an elevator in her home because she is unable to go up steps. She has also had to give up driving. She has had multiple other brands of IVIG with side effects. January 2012 she received Gammaplex which not only didn't help but caused some reactions. In December of 2019 we gave her Gammunex-C and she ended up in ER 3 different times with spreading systemic rash all over. There was another time she tried Privigen but it did not work and she had some problems afterward with kidney function problems. The Gammagard liquid was the only brand that helped her weakness and symptoms and she was able to tolerate without any complications. She has been getting this for quite a while and Neurologist always orders this a ELENA. Kip Dominguez has over the years authorized this in the past. The patient is fearful to try other brands due to all the complications she has had in past and the Gammagard liquid works for her without any side effects.                              Respectfully submitted,                           Miguel Vasquez                           Clinical Coordinator for [address] [place] [address] ORTHOPEDIC AND SPINE hospitals AT [place]                           Phone [phone]

## 2023-09-13 ENCOUNTER — HOSPITAL ENCOUNTER (OUTPATIENT)
Dept: INFUSION THERAPY | Age: 68
Setting detail: INFUSION SERIES
Discharge: HOME | End: 2023-09-13
Payer: MEDICARE

## 2023-09-13 VITALS
SYSTOLIC BLOOD PRESSURE: 118 MMHG | HEART RATE: 71 BPM | TEMPERATURE: 97.4 F | OXYGEN SATURATION: 96 % | DIASTOLIC BLOOD PRESSURE: 70 MMHG | RESPIRATION RATE: 18 BRPM

## 2023-09-13 DIAGNOSIS — G61.81 CHRONIC INFLAMMATORY DEMYELINATING POLYNEURITIS (HCC): Primary | ICD-10-CM

## 2023-09-13 LAB
ANION GAP SERPL CALCULATED.3IONS-SCNC: 9 MMOL/L (ref 3–16)
BASOPHILS # BLD: 0.1 K/UL (ref 0–0.2)
BASOPHILS NFR BLD: 0.9 %
BUN SERPL-MCNC: 12 MG/DL (ref 7–20)
CALCIUM SERPL-MCNC: 9.9 MG/DL (ref 8.3–10.6)
CHLORIDE SERPL-SCNC: 96 MMOL/L (ref 99–110)
CO2 SERPL-SCNC: 28 MMOL/L (ref 21–32)
CREAT SERPL-MCNC: <0.5 MG/DL (ref 0.6–1.2)
DEPRECATED RDW RBC AUTO: 15.6 % (ref 12.4–15.4)
EOSINOPHIL # BLD: 0.1 K/UL (ref 0–0.6)
EOSINOPHIL NFR BLD: 2 %
FERRITIN SERPL IA-MCNC: 673.6 NG/ML (ref 15–150)
GFR SERPLBLD CREATININE-BSD FMLA CKD-EPI: >60 ML/MIN/{1.73_M2}
GLUCOSE SERPL-MCNC: 80 MG/DL (ref 70–99)
HCT VFR BLD AUTO: 33.7 % (ref 36–48)
HGB BLD-MCNC: 11.1 G/DL (ref 12–16)
IRON SATN MFR SERPL: 17 % (ref 15–50)
IRON SERPL-MCNC: 48 UG/DL (ref 37–145)
LYMPHOCYTES # BLD: 0.6 K/UL (ref 1–5.1)
LYMPHOCYTES NFR BLD: 8.6 %
MCH RBC QN AUTO: 27.2 PG (ref 26–34)
MCHC RBC AUTO-ENTMCNC: 32.8 G/DL (ref 31–36)
MCV RBC AUTO: 83 FL (ref 80–100)
MONOCYTES # BLD: 0.3 K/UL (ref 0–1.3)
MONOCYTES NFR BLD: 4.3 %
NEUTROPHILS # BLD: 5.8 K/UL (ref 1.7–7.7)
NEUTROPHILS NFR BLD: 84.2 %
PLATELET # BLD AUTO: 538 K/UL (ref 135–450)
PMV BLD AUTO: 8.3 FL (ref 5–10.5)
POTASSIUM SERPL-SCNC: 3.6 MMOL/L (ref 3.5–5.1)
RBC # BLD AUTO: 4.06 M/UL (ref 4–5.2)
REASON FOR REJECTION: NORMAL
REJECTED TEST: NORMAL
SODIUM SERPL-SCNC: 133 MMOL/L (ref 136–145)
TIBC SERPL-MCNC: 276 UG/DL (ref 260–445)
WBC # BLD AUTO: 6.9 K/UL (ref 4–11)

## 2023-09-13 PROCEDURE — 96366 THER/PROPH/DIAG IV INF ADDON: CPT

## 2023-09-13 PROCEDURE — 82728 ASSAY OF FERRITIN: CPT

## 2023-09-13 PROCEDURE — 80048 BASIC METABOLIC PNL TOTAL CA: CPT

## 2023-09-13 PROCEDURE — 6370000000 HC RX 637 (ALT 250 FOR IP): Performed by: PSYCHIATRY & NEUROLOGY

## 2023-09-13 PROCEDURE — 36415 COLL VENOUS BLD VENIPUNCTURE: CPT

## 2023-09-13 PROCEDURE — 85025 COMPLETE CBC W/AUTO DIFF WBC: CPT

## 2023-09-13 PROCEDURE — 83550 IRON BINDING TEST: CPT

## 2023-09-13 PROCEDURE — 83540 ASSAY OF IRON: CPT

## 2023-09-13 PROCEDURE — 6360000002 HC RX W HCPCS: Performed by: PSYCHIATRY & NEUROLOGY

## 2023-09-13 PROCEDURE — 96365 THER/PROPH/DIAG IV INF INIT: CPT

## 2023-09-13 RX ORDER — ACETAMINOPHEN 325 MG/1
650 TABLET ORAL
OUTPATIENT
Start: 2023-09-14

## 2023-09-13 RX ORDER — DIPHENHYDRAMINE HCL 25 MG
25 TABLET ORAL ONCE
Status: CANCELLED | OUTPATIENT
Start: 2023-09-14 | End: 2023-09-14

## 2023-09-13 RX ORDER — ACETAMINOPHEN 325 MG/1
650 TABLET ORAL ONCE
Status: COMPLETED | OUTPATIENT
Start: 2023-09-13 | End: 2023-09-13

## 2023-09-13 RX ORDER — SODIUM CHLORIDE 9 MG/ML
INJECTION, SOLUTION INTRAVENOUS CONTINUOUS
OUTPATIENT
Start: 2023-09-14

## 2023-09-13 RX ORDER — ACETAMINOPHEN 325 MG/1
650 TABLET ORAL ONCE
Status: CANCELLED | OUTPATIENT
Start: 2023-09-14 | End: 2023-09-14

## 2023-09-13 RX ORDER — MEPERIDINE HYDROCHLORIDE 25 MG/ML
12.5 INJECTION INTRAMUSCULAR; INTRAVENOUS; SUBCUTANEOUS PRN
OUTPATIENT
Start: 2023-09-14

## 2023-09-13 RX ORDER — SODIUM CHLORIDE 0.9 % (FLUSH) 0.9 %
5-40 SYRINGE (ML) INJECTION PRN
Status: DISCONTINUED | OUTPATIENT
Start: 2023-09-13 | End: 2023-09-14 | Stop reason: HOSPADM

## 2023-09-13 RX ORDER — EPINEPHRINE 1 MG/ML
0.3 INJECTION, SOLUTION, CONCENTRATE INTRAVENOUS PRN
OUTPATIENT
Start: 2023-09-14

## 2023-09-13 RX ORDER — DIPHENHYDRAMINE HCL 25 MG
25 TABLET ORAL ONCE
Status: COMPLETED | OUTPATIENT
Start: 2023-09-13 | End: 2023-09-13

## 2023-09-13 RX ORDER — SODIUM CHLORIDE 0.9 % (FLUSH) 0.9 %
5-40 SYRINGE (ML) INJECTION PRN
Status: CANCELLED | OUTPATIENT
Start: 2023-09-14

## 2023-09-13 RX ORDER — DIPHENHYDRAMINE HYDROCHLORIDE 50 MG/ML
50 INJECTION INTRAMUSCULAR; INTRAVENOUS
OUTPATIENT
Start: 2023-09-14

## 2023-09-13 RX ORDER — ALBUTEROL SULFATE 90 UG/1
4 AEROSOL, METERED RESPIRATORY (INHALATION) PRN
OUTPATIENT
Start: 2023-09-14

## 2023-09-13 RX ORDER — ONDANSETRON 2 MG/ML
8 INJECTION INTRAMUSCULAR; INTRAVENOUS
OUTPATIENT
Start: 2023-09-14

## 2023-09-13 RX ADMIN — DIPHENHYDRAMINE HCL 25 MG: 25 TABLET ORAL at 09:23

## 2023-09-13 RX ADMIN — ACETAMINOPHEN 650 MG: 325 TABLET ORAL at 09:23

## 2023-09-13 RX ADMIN — IMMUNE GLOBULIN INFUSION (HUMAN) 30 G: 100 INJECTION, SOLUTION INTRAVENOUS; SUBCUTANEOUS at 11:54

## 2023-09-13 RX ADMIN — IMMUNE GLOBULIN INFUSION (HUMAN) 20 G: 100 INJECTION, SOLUTION INTRAVENOUS; SUBCUTANEOUS at 09:40

## 2023-09-13 NOTE — DISCHARGE INSTRUCTIONS
Outpatient Infusion Discharge Instructions  [address] [facility]  [address], [address]  Telephone: [phone]      FAX [phone]    NAME:  Ajit Ochoa OF BIRTH:  1955  MEDICAL RECORD NUMBER:  [id number]  DATE:  @ED@    Reason for Outpatient Infusion Visit:  Received IVIG 50 grams IV today. Will receive 50 grams again tomorrow. If you develop any these symptoms please contact you Doctor    [] Nausea and/or vomiting not relieved with medication   [] Swelling, redness, and/or bleeding at injection or IV site    [] Fever or chills  [] Rash or itching   [] Shortness of breath  [] Please review After Visit Summary (AVS) information on    [] Other      Outpatient [address] [facility],[address]Th Floor South: Should you experience any significant changes in your health or have questions about your care please contact the [address] [place] [address] at [address] [facility] 8:00 am - 4:00 pm.  If you need help outside these hours and cannot wait until we are again available, contact your Primary Care Physician or go to the hospital emergency room.        Electronically signed by Bernard Yost RN on 9/13/2023 at 2:04 PM

## 2023-09-14 ENCOUNTER — HOSPITAL ENCOUNTER (OUTPATIENT)
Dept: INFUSION THERAPY | Age: 68
Setting detail: INFUSION SERIES
Discharge: HOME | End: 2023-09-14
Payer: MEDICARE

## 2023-09-14 VITALS
RESPIRATION RATE: 18 BRPM | HEART RATE: 69 BPM | SYSTOLIC BLOOD PRESSURE: 135 MMHG | TEMPERATURE: 98.1 F | DIASTOLIC BLOOD PRESSURE: 68 MMHG | OXYGEN SATURATION: 97 %

## 2023-09-14 DIAGNOSIS — G61.81 CHRONIC INFLAMMATORY DEMYELINATING POLYNEURITIS (HCC): Primary | ICD-10-CM

## 2023-09-14 PROCEDURE — 2580000003 HC RX 258: Performed by: PSYCHIATRY & NEUROLOGY

## 2023-09-14 PROCEDURE — 6370000000 HC RX 637 (ALT 250 FOR IP): Performed by: PSYCHIATRY & NEUROLOGY

## 2023-09-14 PROCEDURE — 96366 THER/PROPH/DIAG IV INF ADDON: CPT

## 2023-09-14 PROCEDURE — 6360000002 HC RX W HCPCS: Performed by: PSYCHIATRY & NEUROLOGY

## 2023-09-14 PROCEDURE — 96365 THER/PROPH/DIAG IV INF INIT: CPT

## 2023-09-14 RX ORDER — SODIUM CHLORIDE 0.9 % (FLUSH) 0.9 %
5-40 SYRINGE (ML) INJECTION PRN
OUTPATIENT
Start: 2023-09-14

## 2023-09-14 RX ORDER — ACETAMINOPHEN 325 MG/1
650 TABLET ORAL ONCE
Status: CANCELLED | OUTPATIENT
Start: 2023-09-14 | End: 2023-09-14

## 2023-09-14 RX ORDER — ACETAMINOPHEN 325 MG/1
650 TABLET ORAL
OUTPATIENT
Start: 2023-09-14

## 2023-09-14 RX ORDER — DIPHENHYDRAMINE HCL 25 MG
25 TABLET ORAL ONCE
Status: CANCELLED | OUTPATIENT
Start: 2023-09-14 | End: 2023-09-14

## 2023-09-14 RX ORDER — DIPHENHYDRAMINE HCL 25 MG
25 TABLET ORAL ONCE
Status: COMPLETED | OUTPATIENT
Start: 2023-09-14 | End: 2023-09-14

## 2023-09-14 RX ORDER — MEPERIDINE HYDROCHLORIDE 25 MG/ML
12.5 INJECTION INTRAMUSCULAR; INTRAVENOUS; SUBCUTANEOUS PRN
OUTPATIENT
Start: 2023-09-14

## 2023-09-14 RX ORDER — ALBUTEROL SULFATE 90 UG/1
4 AEROSOL, METERED RESPIRATORY (INHALATION) PRN
OUTPATIENT
Start: 2023-09-14

## 2023-09-14 RX ORDER — EPINEPHRINE 1 MG/ML
0.3 INJECTION, SOLUTION, CONCENTRATE INTRAVENOUS PRN
OUTPATIENT
Start: 2023-09-14

## 2023-09-14 RX ORDER — ACETAMINOPHEN 325 MG/1
650 TABLET ORAL ONCE
Status: COMPLETED | OUTPATIENT
Start: 2023-09-14 | End: 2023-09-14

## 2023-09-14 RX ORDER — DIPHENHYDRAMINE HYDROCHLORIDE 50 MG/ML
50 INJECTION INTRAMUSCULAR; INTRAVENOUS
OUTPATIENT
Start: 2023-09-14

## 2023-09-14 RX ORDER — ONDANSETRON 2 MG/ML
8 INJECTION INTRAMUSCULAR; INTRAVENOUS
OUTPATIENT
Start: 2023-09-14

## 2023-09-14 RX ORDER — SODIUM CHLORIDE 0.9 % (FLUSH) 0.9 %
5-40 SYRINGE (ML) INJECTION PRN
Status: DISCONTINUED | OUTPATIENT
Start: 2023-09-14 | End: 2023-09-15 | Stop reason: HOSPADM

## 2023-09-14 RX ORDER — SODIUM CHLORIDE 9 MG/ML
INJECTION, SOLUTION INTRAVENOUS CONTINUOUS
OUTPATIENT
Start: 2023-09-14

## 2023-09-14 RX ADMIN — SODIUM CHLORIDE, PRESERVATIVE FREE 10 ML: 5 INJECTION INTRAVENOUS at 14:27

## 2023-09-14 RX ADMIN — SODIUM CHLORIDE, PRESERVATIVE FREE 10 ML: 5 INJECTION INTRAVENOUS at 11:52

## 2023-09-14 RX ADMIN — SODIUM CHLORIDE, PRESERVATIVE FREE 10 ML: 5 INJECTION INTRAVENOUS at 14:45

## 2023-09-14 RX ADMIN — IMMUNE GLOBULIN INFUSION (HUMAN) 30 G: 100 INJECTION, SOLUTION INTRAVENOUS; SUBCUTANEOUS at 11:53

## 2023-09-14 RX ADMIN — SODIUM CHLORIDE, PRESERVATIVE FREE 10 ML: 5 INJECTION INTRAVENOUS at 09:24

## 2023-09-14 RX ADMIN — IMMUNE GLOBULIN INFUSION (HUMAN) 20 G: 100 INJECTION, SOLUTION INTRAVENOUS; SUBCUTANEOUS at 09:24

## 2023-09-14 RX ADMIN — DIPHENHYDRAMINE HCL 25 MG: 25 TABLET ORAL at 08:45

## 2023-09-14 RX ADMIN — ACETAMINOPHEN 650 MG: 325 TABLET ORAL at 08:45

## 2023-09-14 RX ADMIN — SODIUM CHLORIDE, PRESERVATIVE FREE 10 ML: 5 INJECTION INTRAVENOUS at 09:07

## 2023-09-14 ASSESSMENT — PAIN SCALES - GENERAL
PAINLEVEL_OUTOF10: 0

## 2023-09-14 NOTE — DISCHARGE INSTRUCTIONS
Outpatient Infusion Discharge Instructions  [place] [address], [address] [place] [address] Drive  Telephone: [phone]    NAME:  Val Dillon OF BIRTH:  1955  MEDICAL RECORD NUMBER:  [id number]  DATE:  September 14, 2023    Reason for Outpatient Infusion Visit: IVIG    If you develop any these symptoms please contact you Doctor    [x] Nausea and/or vomiting not relieved with medication   [x] Swelling, redness, and/or bleeding at injection or IV site    [x] Fever or chills  [x] Rash or itching   [x] Shortness of breath  [x] Please review After Visit Summary (AVS) information on: IVIG    [x] Other: Call for follow up appointment as needed      Outpatient [address] [facility],[address]Th Floor South: Should you experience any significant changes in your health or have questions about your care please contact the [address] at [address] [place] [address] 8:00 am - 4:00 pm.  If you need help outside these hours and cannot wait until we are again available, contact your Primary Care Physician or go to the hospital emergency room.        Electronically signed by Rosibel Marino RN on 9/14/2023 at 2:09 PM

## 2023-10-06 ENCOUNTER — HOSPITAL ENCOUNTER (INPATIENT)
Age: 68
LOS: 4 days | Discharge: NURSING, REHAB OR LONG-TERM CARE | DRG: 964 | End: 2023-10-10
Attending: EMERGENCY MEDICINE | Admitting: STUDENT IN AN ORGANIZED HEALTH CARE EDUCATION/TRAINING PROGRAM
Payer: MEDICARE

## 2023-10-06 ENCOUNTER — APPOINTMENT (OUTPATIENT)
Dept: CT IMAGING | Age: 68
DRG: 964 | End: 2023-10-06
Payer: MEDICARE

## 2023-10-06 ENCOUNTER — APPOINTMENT (OUTPATIENT)
Dept: GENERAL RADIOLOGY | Age: 68
DRG: 964 | End: 2023-10-06
Payer: MEDICARE

## 2023-10-06 DIAGNOSIS — J06.9 URI (UPPER RESPIRATORY INFECTION): ICD-10-CM

## 2023-10-06 DIAGNOSIS — E87.1 HYPONATREMIA: ICD-10-CM

## 2023-10-06 DIAGNOSIS — R41.82 ALTERED MENTAL STATUS, UNSPECIFIED ALTERED MENTAL STATUS TYPE: ICD-10-CM

## 2023-10-06 DIAGNOSIS — S06.5XAA SDH (SUBDURAL HEMATOMA) (HCC): Primary | ICD-10-CM

## 2023-10-06 PROBLEM — I62.00 SUBDURAL BLEEDING (HCC): Status: ACTIVE | Noted: 2023-10-06

## 2023-10-06 LAB
ALBUMIN SERPL-MCNC: 3.3 G/DL (ref 3.4–5)
ALP SERPL-CCNC: 73 U/L (ref 40–129)
ALT SERPL-CCNC: 18 U/L (ref 10–40)
ANION GAP SERPL CALCULATED.3IONS-SCNC: 14 MMOL/L (ref 3–16)
ANION GAP SERPL CALCULATED.3IONS-SCNC: 15 MMOL/L (ref 3–16)
AST SERPL-CCNC: 52 U/L (ref 15–37)
BACTERIA URNS QL MICRO: ABNORMAL /HPF
BASOPHILS # BLD: 0 K/UL (ref 0–0.2)
BASOPHILS NFR BLD: 0.2 %
BILIRUB DIRECT SERPL-MCNC: <0.2 MG/DL (ref 0–0.3)
BILIRUB INDIRECT SERPL-MCNC: ABNORMAL MG/DL (ref 0–1)
BILIRUB SERPL-MCNC: 0.3 MG/DL (ref 0–1)
BILIRUB UR QL STRIP.AUTO: NEGATIVE
BUN SERPL-MCNC: 25 MG/DL (ref 7–20)
BUN SERPL-MCNC: 28 MG/DL (ref 7–20)
CALCIUM SERPL-MCNC: 10.3 MG/DL (ref 8.3–10.6)
CALCIUM SERPL-MCNC: 9.4 MG/DL (ref 8.3–10.6)
CHARACTER UR: ABNORMAL
CHLORIDE SERPL-SCNC: 79 MMOL/L (ref 99–110)
CHLORIDE SERPL-SCNC: 86 MMOL/L (ref 99–110)
CK SERPL-CCNC: 1464 U/L (ref 26–192)
CLARITY UR: CLEAR
CO2 SERPL-SCNC: 22 MMOL/L (ref 21–32)
CO2 SERPL-SCNC: 22 MMOL/L (ref 21–32)
COLOR UR: ABNORMAL
CREAT SERPL-MCNC: 0.9 MG/DL (ref 0.6–1.2)
CREAT SERPL-MCNC: 1.2 MG/DL (ref 0.6–1.2)
DEPRECATED RDW RBC AUTO: 16 % (ref 12.4–15.4)
EKG ATRIAL RATE: 92 BPM
EKG DIAGNOSIS: NORMAL
EKG P AXIS: 58 DEGREES
EKG P-R INTERVAL: 222 MS
EKG Q-T INTERVAL: 380 MS
EKG QRS DURATION: 102 MS
EKG QTC CALCULATION (BAZETT): 469 MS
EKG R AXIS: -39 DEGREES
EKG T AXIS: 106 DEGREES
EKG VENTRICULAR RATE: 92 BPM
EOSINOPHIL # BLD: 0 K/UL (ref 0–0.6)
EOSINOPHIL NFR BLD: 0.4 %
EPI CELLS #/AREA URNS HPF: ABNORMAL /HPF (ref 0–5)
GFR SERPLBLD CREATININE-BSD FMLA CKD-EPI: 49 ML/MIN/{1.73_M2}
GFR SERPLBLD CREATININE-BSD FMLA CKD-EPI: >60 ML/MIN/{1.73_M2}
GLUCOSE SERPL-MCNC: 128 MG/DL (ref 70–99)
GLUCOSE SERPL-MCNC: 95 MG/DL (ref 70–99)
GLUCOSE UR STRIP.AUTO-MCNC: NEGATIVE MG/DL
HCT VFR BLD AUTO: 43.4 % (ref 36–48)
HGB BLD-MCNC: 14.6 G/DL (ref 12–16)
HGB UR QL STRIP.AUTO: NEGATIVE
KETONES UR STRIP.AUTO-MCNC: NEGATIVE MG/DL
LEUKOCYTE ESTERASE UR QL STRIP.AUTO: NEGATIVE
LYMPHOCYTES # BLD: 0.6 K/UL (ref 1–5.1)
LYMPHOCYTES NFR BLD: 4.7 %
MAGNESIUM SERPL-MCNC: 1.9 MG/DL (ref 1.8–2.4)
MAGNESIUM SERPL-MCNC: 2.1 MG/DL (ref 1.8–2.4)
MCH RBC QN AUTO: 28 PG (ref 26–34)
MCHC RBC AUTO-ENTMCNC: 33.6 G/DL (ref 31–36)
MCV RBC AUTO: 83.3 FL (ref 80–100)
MONOCYTES # BLD: 0.6 K/UL (ref 0–1.3)
MONOCYTES NFR BLD: 4.4 %
NEUTROPHILS # BLD: 11.6 K/UL (ref 1.7–7.7)
NEUTROPHILS NFR BLD: 90.3 %
NITRITE UR QL STRIP.AUTO: NEGATIVE
NT-PROBNP SERPL-MCNC: 883 PG/ML (ref 0–124)
OSMOLALITY UR: 252 MOSM/KG (ref 390–1070)
PH UR STRIP.AUTO: 5.5 [PH] (ref 5–8)
PLATELET # BLD AUTO: 495 K/UL (ref 135–450)
PMV BLD AUTO: 9 FL (ref 5–10.5)
POTASSIUM SERPL-SCNC: 3.4 MMOL/L (ref 3.5–5.1)
POTASSIUM SERPL-SCNC: 3.5 MMOL/L (ref 3.5–5.1)
PROT SERPL-MCNC: 5.7 G/DL (ref 6.4–8.2)
PROT UR STRIP.AUTO-MCNC: 30 MG/DL
RBC # BLD AUTO: 5.21 M/UL (ref 4–5.2)
RBC #/AREA URNS HPF: ABNORMAL /HPF (ref 0–4)
SODIUM SERPL-SCNC: 116 MMOL/L (ref 136–145)
SODIUM SERPL-SCNC: 122 MMOL/L (ref 136–145)
SODIUM SERPL-SCNC: 123 MMOL/L (ref 136–145)
SP GR UR STRIP.AUTO: 1.01 (ref 1–1.03)
TROPONIN, HIGH SENSITIVITY: 51 NG/L (ref 0–14)
TSH SERPL DL<=0.005 MIU/L-ACNC: 1.18 UIU/ML (ref 0.27–4.2)
UA COMPLETE W REFLEX CULTURE PNL UR: ABNORMAL
UA DIPSTICK W REFLEX MICRO PNL UR: YES
URATE SERPL-MCNC: 6.1 MG/DL (ref 2.6–6)
URN SPEC COLLECT METH UR: ABNORMAL
UROBILINOGEN UR STRIP-ACNC: 0.2 E.U./DL
WBC # BLD AUTO: 12.9 K/UL (ref 4–11)
WBC #/AREA URNS HPF: ABNORMAL /HPF (ref 0–5)

## 2023-10-06 PROCEDURE — 93005 ELECTROCARDIOGRAM TRACING: CPT | Performed by: STUDENT IN AN ORGANIZED HEALTH CARE EDUCATION/TRAINING PROGRAM

## 2023-10-06 PROCEDURE — 6360000002 HC RX W HCPCS: Performed by: STUDENT IN AN ORGANIZED HEALTH CARE EDUCATION/TRAINING PROGRAM

## 2023-10-06 PROCEDURE — 2000000000 HC ICU R&B

## 2023-10-06 PROCEDURE — 82550 ASSAY OF CK (CPK): CPT

## 2023-10-06 PROCEDURE — 83930 ASSAY OF BLOOD OSMOLALITY: CPT

## 2023-10-06 PROCEDURE — 83880 ASSAY OF NATRIURETIC PEPTIDE: CPT

## 2023-10-06 PROCEDURE — 73560 X-RAY EXAM OF KNEE 1 OR 2: CPT

## 2023-10-06 PROCEDURE — 99285 EMERGENCY DEPT VISIT HI MDM: CPT

## 2023-10-06 PROCEDURE — 84295 ASSAY OF SERUM SODIUM: CPT

## 2023-10-06 PROCEDURE — 83735 ASSAY OF MAGNESIUM: CPT

## 2023-10-06 PROCEDURE — 93005 ELECTROCARDIOGRAM TRACING: CPT | Performed by: EMERGENCY MEDICINE

## 2023-10-06 PROCEDURE — 36415 COLL VENOUS BLD VENIPUNCTURE: CPT

## 2023-10-06 PROCEDURE — 83935 ASSAY OF URINE OSMOLALITY: CPT

## 2023-10-06 PROCEDURE — 96361 HYDRATE IV INFUSION ADD-ON: CPT

## 2023-10-06 PROCEDURE — 6370000000 HC RX 637 (ALT 250 FOR IP): Performed by: STUDENT IN AN ORGANIZED HEALTH CARE EDUCATION/TRAINING PROGRAM

## 2023-10-06 PROCEDURE — 02HV33Z INSERTION OF INFUSION DEVICE INTO SUPERIOR VENA CAVA, PERCUTANEOUS APPROACH: ICD-10-PCS | Performed by: FAMILY MEDICINE

## 2023-10-06 PROCEDURE — 81001 URINALYSIS AUTO W/SCOPE: CPT

## 2023-10-06 PROCEDURE — 6360000002 HC RX W HCPCS: Performed by: INTERNAL MEDICINE

## 2023-10-06 PROCEDURE — 93010 ELECTROCARDIOGRAM REPORT: CPT | Performed by: INTERNAL MEDICINE

## 2023-10-06 PROCEDURE — 85025 COMPLETE CBC W/AUTO DIFF WBC: CPT

## 2023-10-06 PROCEDURE — 80076 HEPATIC FUNCTION PANEL: CPT

## 2023-10-06 PROCEDURE — 70450 CT HEAD/BRAIN W/O DYE: CPT

## 2023-10-06 PROCEDURE — 71045 X-RAY EXAM CHEST 1 VIEW: CPT

## 2023-10-06 PROCEDURE — 84443 ASSAY THYROID STIM HORMONE: CPT

## 2023-10-06 PROCEDURE — 96360 HYDRATION IV INFUSION INIT: CPT

## 2023-10-06 PROCEDURE — 2580000003 HC RX 258: Performed by: EMERGENCY MEDICINE

## 2023-10-06 PROCEDURE — 80048 BASIC METABOLIC PNL TOTAL CA: CPT

## 2023-10-06 PROCEDURE — 84550 ASSAY OF BLOOD/URIC ACID: CPT

## 2023-10-06 PROCEDURE — 36569 INSJ PICC 5 YR+ W/O IMAGING: CPT

## 2023-10-06 PROCEDURE — 94640 AIRWAY INHALATION TREATMENT: CPT

## 2023-10-06 PROCEDURE — C1751 CATH, INF, PER/CENT/MIDLINE: HCPCS

## 2023-10-06 PROCEDURE — 2580000003 HC RX 258: Performed by: STUDENT IN AN ORGANIZED HEALTH CARE EDUCATION/TRAINING PROGRAM

## 2023-10-06 PROCEDURE — 84484 ASSAY OF TROPONIN QUANT: CPT

## 2023-10-06 RX ORDER — AMLODIPINE AND OLMESARTAN MEDOXOMIL 10; 40 MG/1; MG/1
1 TABLET ORAL DAILY
Status: DISCONTINUED | OUTPATIENT
Start: 2023-10-06 | End: 2023-10-06

## 2023-10-06 RX ORDER — ATORVASTATIN CALCIUM 40 MG/1
40 TABLET, FILM COATED ORAL DAILY
Status: DISCONTINUED | OUTPATIENT
Start: 2023-10-06 | End: 2023-10-10 | Stop reason: HOSPADM

## 2023-10-06 RX ORDER — ACETAMINOPHEN 325 MG/1
650 TABLET ORAL EVERY 6 HOURS PRN
Status: DISCONTINUED | OUTPATIENT
Start: 2023-10-06 | End: 2023-10-10 | Stop reason: HOSPADM

## 2023-10-06 RX ORDER — LIDOCAINE HYDROCHLORIDE 10 MG/ML
5 INJECTION, SOLUTION EPIDURAL; INFILTRATION; INTRACAUDAL; PERINEURAL ONCE
Status: DISCONTINUED | OUTPATIENT
Start: 2023-10-06 | End: 2023-10-10 | Stop reason: HOSPADM

## 2023-10-06 RX ORDER — ACETAMINOPHEN 650 MG/1
650 SUPPOSITORY RECTAL EVERY 6 HOURS PRN
Status: DISCONTINUED | OUTPATIENT
Start: 2023-10-06 | End: 2023-10-10 | Stop reason: HOSPADM

## 2023-10-06 RX ORDER — SODIUM CHLORIDE 9 MG/ML
INJECTION, SOLUTION INTRAVENOUS PRN
Status: DISCONTINUED | OUTPATIENT
Start: 2023-10-06 | End: 2023-10-10 | Stop reason: HOSPADM

## 2023-10-06 RX ORDER — SODIUM CHLORIDE 0.9 % (FLUSH) 0.9 %
5-40 SYRINGE (ML) INJECTION PRN
Status: DISCONTINUED | OUTPATIENT
Start: 2023-10-06 | End: 2023-10-07

## 2023-10-06 RX ORDER — ALBUTEROL SULFATE 1.25 MG/3ML
0.63 SOLUTION RESPIRATORY (INHALATION) EVERY 6 HOURS PRN
Status: DISCONTINUED | OUTPATIENT
Start: 2023-10-06 | End: 2023-10-10 | Stop reason: HOSPADM

## 2023-10-06 RX ORDER — SODIUM CHLORIDE 0.9 % (FLUSH) 0.9 %
5-40 SYRINGE (ML) INJECTION EVERY 12 HOURS SCHEDULED
Status: DISCONTINUED | OUTPATIENT
Start: 2023-10-06 | End: 2023-10-07

## 2023-10-06 RX ORDER — FERROUS SULFATE TAB EC 324 MG (65 MG FE EQUIVALENT) 324 (65 FE) MG
324 TABLET DELAYED RESPONSE ORAL
Status: DISCONTINUED | OUTPATIENT
Start: 2023-10-07 | End: 2023-10-07

## 2023-10-06 RX ORDER — ONDANSETRON 2 MG/ML
4 INJECTION INTRAMUSCULAR; INTRAVENOUS EVERY 6 HOURS PRN
Status: DISCONTINUED | OUTPATIENT
Start: 2023-10-06 | End: 2023-10-10 | Stop reason: HOSPADM

## 2023-10-06 RX ORDER — METOPROLOL TARTRATE 50 MG/1
100 TABLET, FILM COATED ORAL DAILY
Status: DISCONTINUED | OUTPATIENT
Start: 2023-10-06 | End: 2023-10-10 | Stop reason: HOSPADM

## 2023-10-06 RX ORDER — FUROSEMIDE 20 MG/1
20 TABLET ORAL DAILY
Status: DISCONTINUED | OUTPATIENT
Start: 2023-10-06 | End: 2023-10-10 | Stop reason: HOSPADM

## 2023-10-06 RX ORDER — SODIUM CHLORIDE 0.9 % (FLUSH) 0.9 %
5-40 SYRINGE (ML) INJECTION PRN
Status: DISCONTINUED | OUTPATIENT
Start: 2023-10-06 | End: 2023-10-10 | Stop reason: HOSPADM

## 2023-10-06 RX ORDER — SODIUM CHLORIDE 9 MG/ML
25 INJECTION, SOLUTION INTRAVENOUS PRN
Status: DISCONTINUED | OUTPATIENT
Start: 2023-10-06 | End: 2023-10-10 | Stop reason: HOSPADM

## 2023-10-06 RX ORDER — METOPROLOL SUCCINATE 100 MG/1
100 TABLET, EXTENDED RELEASE ORAL DAILY
Status: ON HOLD | COMMUNITY

## 2023-10-06 RX ORDER — AMLODIPINE BESYLATE 10 MG/1
10 TABLET ORAL DAILY
Status: DISCONTINUED | OUTPATIENT
Start: 2023-10-06 | End: 2023-10-08

## 2023-10-06 RX ORDER — POLYETHYLENE GLYCOL 3350 17 G/17G
17 POWDER, FOR SOLUTION ORAL DAILY PRN
Status: DISCONTINUED | OUTPATIENT
Start: 2023-10-06 | End: 2023-10-10 | Stop reason: HOSPADM

## 2023-10-06 RX ORDER — SODIUM CHLORIDE, SODIUM LACTATE, POTASSIUM CHLORIDE, CALCIUM CHLORIDE 600; 310; 30; 20 MG/100ML; MG/100ML; MG/100ML; MG/100ML
INJECTION, SOLUTION INTRAVENOUS CONTINUOUS
Status: DISCONTINUED | OUTPATIENT
Start: 2023-10-06 | End: 2023-10-06

## 2023-10-06 RX ORDER — SODIUM CHLORIDE 0.9 % (FLUSH) 0.9 %
5-40 SYRINGE (ML) INJECTION EVERY 12 HOURS SCHEDULED
Status: DISCONTINUED | OUTPATIENT
Start: 2023-10-06 | End: 2023-10-10 | Stop reason: HOSPADM

## 2023-10-06 RX ORDER — POTASSIUM CHLORIDE 7.45 MG/ML
10 INJECTION INTRAVENOUS
Status: COMPLETED | OUTPATIENT
Start: 2023-10-06 | End: 2023-10-06

## 2023-10-06 RX ORDER — PREDNISONE 1 MG/1
2 TABLET ORAL DAILY
Status: DISCONTINUED | OUTPATIENT
Start: 2023-10-06 | End: 2023-10-10 | Stop reason: HOSPADM

## 2023-10-06 RX ORDER — HYDROMORPHONE HYDROCHLORIDE 4 MG/1
4 TABLET ORAL EVERY 4 HOURS PRN
Status: DISCONTINUED | OUTPATIENT
Start: 2023-10-06 | End: 2023-10-10 | Stop reason: HOSPADM

## 2023-10-06 RX ORDER — LOSARTAN POTASSIUM 100 MG/1
100 TABLET ORAL DAILY
Status: DISCONTINUED | OUTPATIENT
Start: 2023-10-06 | End: 2023-10-06

## 2023-10-06 RX ORDER — ONDANSETRON 4 MG/1
4 TABLET, ORALLY DISINTEGRATING ORAL EVERY 8 HOURS PRN
Status: DISCONTINUED | OUTPATIENT
Start: 2023-10-06 | End: 2023-10-10 | Stop reason: HOSPADM

## 2023-10-06 RX ORDER — SODIUM CHLORIDE 9 MG/ML
INJECTION, SOLUTION INTRAVENOUS CONTINUOUS
Status: DISCONTINUED | OUTPATIENT
Start: 2023-10-06 | End: 2023-10-06

## 2023-10-06 RX ORDER — LEVETIRACETAM 5 MG/ML
500 INJECTION INTRAVASCULAR EVERY 12 HOURS
Status: DISCONTINUED | OUTPATIENT
Start: 2023-10-07 | End: 2023-10-09

## 2023-10-06 RX ORDER — FENOFIBRATE 160 MG/1
160 TABLET ORAL DAILY
Status: DISCONTINUED | OUTPATIENT
Start: 2023-10-06 | End: 2023-10-10 | Stop reason: HOSPADM

## 2023-10-06 RX ORDER — 0.9 % SODIUM CHLORIDE 0.9 %
500 INTRAVENOUS SOLUTION INTRAVENOUS ONCE
Status: COMPLETED | OUTPATIENT
Start: 2023-10-06 | End: 2023-10-06

## 2023-10-06 RX ORDER — MONTELUKAST SODIUM 10 MG/1
10 TABLET ORAL NIGHTLY
Status: DISCONTINUED | OUTPATIENT
Start: 2023-10-06 | End: 2023-10-10 | Stop reason: HOSPADM

## 2023-10-06 RX ADMIN — SODIUM CHLORIDE: 9 INJECTION, SOLUTION INTRAVENOUS at 14:38

## 2023-10-06 RX ADMIN — MONTELUKAST 10 MG: 10 TABLET, FILM COATED ORAL at 19:47

## 2023-10-06 RX ADMIN — PREDNISONE 2 MG: 1 TABLET ORAL at 19:47

## 2023-10-06 RX ADMIN — POTASSIUM CHLORIDE 10 MEQ: 7.46 INJECTION, SOLUTION INTRAVENOUS at 18:54

## 2023-10-06 RX ADMIN — POTASSIUM CHLORIDE 10 MEQ: 7.46 INJECTION, SOLUTION INTRAVENOUS at 20:11

## 2023-10-06 RX ADMIN — LEVETIRACETAM 1500 MG: 100 INJECTION, SOLUTION INTRAVENOUS at 18:11

## 2023-10-06 RX ADMIN — MOMETASONE FUROATE AND FORMOTEROL FUMARATE DIHYDRATE 2 PUFF: 200; 5 AEROSOL RESPIRATORY (INHALATION) at 19:39

## 2023-10-06 RX ADMIN — POTASSIUM CHLORIDE 10 MEQ: 7.46 INJECTION, SOLUTION INTRAVENOUS at 21:19

## 2023-10-06 RX ADMIN — SODIUM CHLORIDE 500 ML: 9 INJECTION, SOLUTION INTRAVENOUS at 12:51

## 2023-10-06 ASSESSMENT — PAIN SCALES - GENERAL
PAINLEVEL_OUTOF10: 0

## 2023-10-06 ASSESSMENT — ENCOUNTER SYMPTOMS
ABDOMINAL PAIN: 0
BACK PAIN: 0
SHORTNESS OF BREATH: 0

## 2023-10-06 ASSESSMENT — LIFESTYLE VARIABLES
HOW OFTEN DO YOU HAVE A DRINK CONTAINING ALCOHOL: NEVER
HOW MANY STANDARD DRINKS CONTAINING ALCOHOL DO YOU HAVE ON A TYPICAL DAY: PATIENT DOES NOT DRINK
HOW OFTEN DO YOU HAVE A DRINK CONTAINING ALCOHOL: NEVER
HOW MANY STANDARD DRINKS CONTAINING ALCOHOL DO YOU HAVE ON A TYPICAL DAY: PATIENT DOES NOT DRINK

## 2023-10-06 NOTE — ED NOTES
Pt arrived to the ED via EMS, Pt states that she was \" on he floor for 2 days waiting for help\" pt is alert and orient, vss afebrile,  pt has bruises in multiple stage on lower BLE and BUE and stomach      Mindy Black RN  10/06/23 [id number]

## 2023-10-06 NOTE — ED NOTES
To Room 2117  Cardiac monitor on during transfer  Pt's pain level denies  VSS, Afebrile   IV site is clean, dry and intact, Normal saline locked            [facility] [address]  10/06/23 9280

## 2023-10-06 NOTE — ED NOTES
Pt resting in bed at this time, laying in a supine position with head of bed elevated . Call light remains in reach instructed pt how to use, and encouraged pt to call if needed assistance, no distress noted. RR even and unlabored, skin warm and dry. No needs at this time. Will continue to monitor closely.        Magdalene Roads RN  10/06/23 7834

## 2023-10-06 NOTE — H&P
Hospital Medicine History & Physical      Date of Admission: 10/6/2023    Date of Service:  10/6/2023    [x]Admitted to Inpatient with expected LOS greater than two midnights due to medical therapy. []Placed in Observation status. Chief Admission Complaint: Fall    Presenting Admission History: This is a 70-year-old female with past medical history of hypertension, GBS, asthma/COPD, chronic pain on Dilaudid, hyperlipidemia who presented to the emergency department after a fall. Patient states around 1 AM yesterday she missed her walker and fell down. She thinks she hit her head. She states she remained on the floor as she could not get up and make it to the bed. She called her friend this morning and told her that she has been on the ground for about a day she was sent to the emergency department by the neighbor. Patient states she has not been eating and drinking of for greater than 24 hours. In the emergency department patient was hemodynamically stable afebrile. Is initially slightly confused but confusion had resolved. Labs are revealing hyponatremia at 116, hypokalemia, elevated CK level, slight leukocytosis. CT head with small subdural hematoma bilaterally.          Assessment/Plan:      #Subdural hematoma  #Traumatic rhabdomyolysis  #Fall  #Hyponatremia-likely hypovolemic  #Hypokalemia  #Leukocytosis-likely reactive  #Hypertension  #GBS  #Asthma/COPD  #Chronic pain  #Recurrent falls    Plan  Admit to the intensive care unit for frequent neurochecks every hour  Neuro surgery consulted-recommending to stay here  Keppra for seizure prophylaxis  Repeat CT scan in 6 hours  IV fluids  Monitor CPK  Urine studies to further characterize hyponatremia  Nephrology consult  BMP every 6 hours  Continue antihypertensive medications  Continue steroids for GBS  Long-acting short acting inhalers for asthma/COPD  PT/OT  SCDs hold DVT prophylaxis      Discussed management of the case in detail w/ the Emergency

## 2023-10-06 NOTE — ED NOTES
Pt and visitor at the bedside updated on plan of care, Pt resting in bed at this time, laying in a supine position with head of bed elevated . Call light remains in reach instructed pt how to use, and encouraged pt to call if needed assistance, no distress noted. RR even and unlabored, skin warm and dry. No needs at this time. Will continue to monitor closely.        Juana Maravilla, BENITO  10/06/23 0675

## 2023-10-06 NOTE — ED NOTES
Pt resting in bed at this time, laying in a supine position with head of bed elevated . Call light remains in reach instructed pt how to use, and encouraged pt to call if needed assistance, no distress noted. RR even and unlabored, skin warm and dry. No needs at this time. Will continue to monitor closely.        Rosario Gay RN  10/06/23 7924

## 2023-10-06 NOTE — ED PROVIDER NOTES
[address] [facility] [address] [address]      Pt Name: Nerissa Fothergill  MRN: [id number]  [address] [facility] 1955  Date of evaluation: 10/6/2023  Provider: Sanford Bear MD    CHIEF COMPLAINT       Chief Complaint   Patient presents with    Altered Mental Status     Pt states that she was \" on he floor for 2 days waiting for help\" pt is alert and orient          HISTORY OF PRESENT ILLNESS   (Location/Symptom, Timing/Onset, Context/Setting, Quality, Duration, Modifying Factors, Severity)  Note limiting factors. Nerissa Fothergill is a 70 y.o. female who presents to the emergency department after fall    HPI    69-year-old  female with multiple medical comorbidities who lives at home by herself. EMS was called by her friend who found her on the ground. The patient does not recall falling but states that she was on the ground for about 2 days unable to get up secondary to chronic knee issues. EMS noted pills scattered all over the home. Currently the patient is oriented x2 denies any significant medical complaints but does not recall how she ended up on the ground was just unable to get up. Scattered in multiple areas of ecchymosis on her upper and lower extremities. Nursing Notes were reviewed. REVIEW OF SYSTEMS    (2-9 systems for level 4, 10 or more for level 5)     Review of Systems   Constitutional:  Negative for chills and fever. Respiratory:  Negative for shortness of breath. Cardiovascular:  Negative for chest pain. Gastrointestinal:  Negative for abdominal pain. Genitourinary:  Negative for dysuria. Musculoskeletal:  Positive for arthralgias. Negative for back pain. Neurological:  Positive for weakness. Negative for seizures, speech difficulty, numbness and headaches. All other systems reviewed and are negative. Except as noted above the remainder of the review of systems was reviewed and negative.        PAST MEDICAL HISTORY     Past Medical

## 2023-10-07 ENCOUNTER — APPOINTMENT (OUTPATIENT)
Dept: MRI IMAGING | Age: 68
DRG: 964 | End: 2023-10-07
Payer: MEDICARE

## 2023-10-07 LAB
ALBUMIN SERPL-MCNC: 3.1 G/DL (ref 3.4–5)
ALBUMIN/GLOB SERPL: 1 {RATIO} (ref 1.1–2.2)
ALP SERPL-CCNC: 71 U/L (ref 40–129)
ALT SERPL-CCNC: 18 U/L (ref 10–40)
ANION GAP SERPL CALCULATED.3IONS-SCNC: 13 MMOL/L (ref 3–16)
ANION GAP SERPL CALCULATED.3IONS-SCNC: 15 MMOL/L (ref 3–16)
ANION GAP SERPL CALCULATED.3IONS-SCNC: 15 MMOL/L (ref 3–16)
ANION GAP SERPL CALCULATED.3IONS-SCNC: 16 MMOL/L (ref 3–16)
ANION GAP SERPL CALCULATED.3IONS-SCNC: 16 MMOL/L (ref 3–16)
AST SERPL-CCNC: 37 U/L (ref 15–37)
BACTERIA URNS QL MICRO: ABNORMAL /HPF
BASOPHILS # BLD: 0 K/UL (ref 0–0.2)
BASOPHILS NFR BLD: 0.5 %
BILIRUB SERPL-MCNC: 0.4 MG/DL (ref 0–1)
BILIRUB UR QL STRIP.AUTO: NEGATIVE
BUN SERPL-MCNC: 21 MG/DL (ref 7–20)
BUN SERPL-MCNC: 23 MG/DL (ref 7–20)
BUN SERPL-MCNC: 24 MG/DL (ref 7–20)
CALCIUM SERPL-MCNC: 9.4 MG/DL (ref 8.3–10.6)
CALCIUM SERPL-MCNC: 9.4 MG/DL (ref 8.3–10.6)
CALCIUM SERPL-MCNC: 9.7 MG/DL (ref 8.3–10.6)
CALCIUM SERPL-MCNC: 9.8 MG/DL (ref 8.3–10.6)
CALCIUM SERPL-MCNC: 9.9 MG/DL (ref 8.3–10.6)
CHLORIDE SERPL-SCNC: 88 MMOL/L (ref 99–110)
CHLORIDE SERPL-SCNC: 89 MMOL/L (ref 99–110)
CK SERPL-CCNC: 299 U/L (ref 26–192)
CK SERPL-CCNC: 392 U/L (ref 26–192)
CLARITY UR: ABNORMAL
CO2 SERPL-SCNC: 18 MMOL/L (ref 21–32)
CO2 SERPL-SCNC: 20 MMOL/L (ref 21–32)
CO2 SERPL-SCNC: 22 MMOL/L (ref 21–32)
COARSE GRAN CASTS #/AREA URNS LPF: ABNORMAL /LPF (ref 0–2)
COLOR UR: YELLOW
CORTIS PM SERPL-MCNC: 18.4 UG/DL (ref 3.1–16.7)
CREAT SERPL-MCNC: 0.7 MG/DL (ref 0.6–1.2)
CREAT SERPL-MCNC: 0.7 MG/DL (ref 0.6–1.2)
CREAT SERPL-MCNC: 0.8 MG/DL (ref 0.6–1.2)
DEPRECATED RDW RBC AUTO: 16.1 % (ref 12.4–15.4)
EKG ATRIAL RATE: 82 BPM
EKG ATRIAL RATE: 91 BPM
EKG DIAGNOSIS: NORMAL
EKG DIAGNOSIS: NORMAL
EKG P AXIS: 58 DEGREES
EKG P AXIS: 74 DEGREES
EKG P-R INTERVAL: 210 MS
EKG P-R INTERVAL: 220 MS
EKG Q-T INTERVAL: 400 MS
EKG Q-T INTERVAL: 408 MS
EKG QRS DURATION: 100 MS
EKG QRS DURATION: 104 MS
EKG QTC CALCULATION (BAZETT): 476 MS
EKG QTC CALCULATION (BAZETT): 492 MS
EKG R AXIS: -46 DEGREES
EKG R AXIS: -48 DEGREES
EKG T AXIS: 113 DEGREES
EKG T AXIS: 95 DEGREES
EKG VENTRICULAR RATE: 82 BPM
EKG VENTRICULAR RATE: 91 BPM
EOSINOPHIL # BLD: 0.1 K/UL (ref 0–0.6)
EOSINOPHIL NFR BLD: 1.2 %
EPI CELLS #/AREA URNS AUTO: 6 /HPF (ref 0–5)
FINE GRAN CASTS #/AREA URNS HPF: ABNORMAL /LPF (ref 0–2)
GFR SERPLBLD CREATININE-BSD FMLA CKD-EPI: >60 ML/MIN/{1.73_M2}
GLUCOSE BLD-MCNC: 86 MG/DL (ref 70–99)
GLUCOSE SERPL-MCNC: 89 MG/DL (ref 70–99)
GLUCOSE SERPL-MCNC: 93 MG/DL (ref 70–99)
GLUCOSE SERPL-MCNC: 94 MG/DL (ref 70–99)
GLUCOSE SERPL-MCNC: 97 MG/DL (ref 70–99)
GLUCOSE SERPL-MCNC: 98 MG/DL (ref 70–99)
GLUCOSE UR STRIP.AUTO-MCNC: NEGATIVE MG/DL
HCT VFR BLD AUTO: 40.8 % (ref 36–48)
HGB BLD-MCNC: 13.6 G/DL (ref 12–16)
HGB UR QL STRIP.AUTO: ABNORMAL
HYALINE CASTS #/AREA URNS AUTO: 12 /LPF (ref 0–8)
KETONES UR STRIP.AUTO-MCNC: ABNORMAL MG/DL
LEUKOCYTE ESTERASE UR QL STRIP.AUTO: ABNORMAL
LYMPHOCYTES # BLD: 0.9 K/UL (ref 1–5.1)
LYMPHOCYTES NFR BLD: 8.8 %
MAGNESIUM SERPL-MCNC: 2 MG/DL (ref 1.8–2.4)
MCH RBC QN AUTO: 28.1 PG (ref 26–34)
MCHC RBC AUTO-ENTMCNC: 33.4 G/DL (ref 31–36)
MCV RBC AUTO: 84.1 FL (ref 80–100)
MONOCYTES # BLD: 0.8 K/UL (ref 0–1.3)
MONOCYTES NFR BLD: 8.1 %
NEUTROPHILS # BLD: 8 K/UL (ref 1.7–7.7)
NEUTROPHILS NFR BLD: 81.4 %
NITRITE UR QL STRIP.AUTO: NEGATIVE
PERFORMED ON: NORMAL
PH UR STRIP.AUTO: 5.5 [PH] (ref 5–8)
PLATELET # BLD AUTO: 472 K/UL (ref 135–450)
PMV BLD AUTO: 9.5 FL (ref 5–10.5)
POTASSIUM SERPL-SCNC: 3.4 MMOL/L (ref 3.5–5.1)
POTASSIUM SERPL-SCNC: 3.4 MMOL/L (ref 3.5–5.1)
POTASSIUM SERPL-SCNC: 3.5 MMOL/L (ref 3.5–5.1)
POTASSIUM SERPL-SCNC: 3.6 MMOL/L (ref 3.5–5.1)
POTASSIUM SERPL-SCNC: 3.8 MMOL/L (ref 3.5–5.1)
PROT SERPL-MCNC: 6.2 G/DL (ref 6.4–8.2)
PROT UR STRIP.AUTO-MCNC: ABNORMAL MG/DL
RBC # BLD AUTO: 4.85 M/UL (ref 4–5.2)
RBC CLUMPS #/AREA URNS AUTO: 3 /HPF (ref 0–4)
SODIUM SERPL-SCNC: 122 MMOL/L (ref 136–145)
SODIUM SERPL-SCNC: 123 MMOL/L (ref 136–145)
SODIUM SERPL-SCNC: 124 MMOL/L (ref 136–145)
SODIUM SERPL-SCNC: 125 MMOL/L (ref 136–145)
SODIUM SERPL-SCNC: 125 MMOL/L (ref 136–145)
SODIUM SERPL-SCNC: 128 MMOL/L (ref 136–145)
SP GR UR STRIP.AUTO: 1.01 (ref 1–1.03)
UA DIPSTICK W REFLEX MICRO PNL UR: YES
URN SPEC COLLECT METH UR: ABNORMAL
UROBILINOGEN UR STRIP-ACNC: 1 E.U./DL
WBC # BLD AUTO: 9.8 K/UL (ref 4–11)
WBC #/AREA URNS AUTO: 25 /HPF (ref 0–5)

## 2023-10-07 PROCEDURE — 6370000000 HC RX 637 (ALT 250 FOR IP): Performed by: STUDENT IN AN ORGANIZED HEALTH CARE EDUCATION/TRAINING PROGRAM

## 2023-10-07 PROCEDURE — 2000000000 HC ICU R&B

## 2023-10-07 PROCEDURE — 83735 ASSAY OF MAGNESIUM: CPT

## 2023-10-07 PROCEDURE — 97162 PT EVAL MOD COMPLEX 30 MIN: CPT

## 2023-10-07 PROCEDURE — 82550 ASSAY OF CK (CPK): CPT

## 2023-10-07 PROCEDURE — 97166 OT EVAL MOD COMPLEX 45 MIN: CPT

## 2023-10-07 PROCEDURE — 97530 THERAPEUTIC ACTIVITIES: CPT

## 2023-10-07 PROCEDURE — 94640 AIRWAY INHALATION TREATMENT: CPT

## 2023-10-07 PROCEDURE — 80053 COMPREHEN METABOLIC PANEL: CPT

## 2023-10-07 PROCEDURE — 81001 URINALYSIS AUTO W/SCOPE: CPT

## 2023-10-07 PROCEDURE — 94760 N-INVAS EAR/PLS OXIMETRY 1: CPT

## 2023-10-07 PROCEDURE — 70551 MRI BRAIN STEM W/O DYE: CPT

## 2023-10-07 PROCEDURE — 82533 TOTAL CORTISOL: CPT

## 2023-10-07 PROCEDURE — 93010 ELECTROCARDIOGRAM REPORT: CPT | Performed by: INTERNAL MEDICINE

## 2023-10-07 PROCEDURE — 36415 COLL VENOUS BLD VENIPUNCTURE: CPT

## 2023-10-07 PROCEDURE — 2580000003 HC RX 258: Performed by: REGISTERED NURSE

## 2023-10-07 PROCEDURE — 6370000000 HC RX 637 (ALT 250 FOR IP): Performed by: FAMILY MEDICINE

## 2023-10-07 PROCEDURE — 84295 ASSAY OF SERUM SODIUM: CPT

## 2023-10-07 PROCEDURE — 6360000002 HC RX W HCPCS: Performed by: STUDENT IN AN ORGANIZED HEALTH CARE EDUCATION/TRAINING PROGRAM

## 2023-10-07 PROCEDURE — 70544 MR ANGIOGRAPHY HEAD W/O DYE: CPT

## 2023-10-07 PROCEDURE — 85025 COMPLETE CBC W/AUTO DIFF WBC: CPT

## 2023-10-07 PROCEDURE — 70547 MR ANGIOGRAPHY NECK W/O DYE: CPT

## 2023-10-07 RX ORDER — LORAZEPAM 1 MG/1
1 TABLET ORAL
Status: COMPLETED | OUTPATIENT
Start: 2023-10-07 | End: 2023-10-07

## 2023-10-07 RX ADMIN — AMLODIPINE BESYLATE 10 MG: 10 TABLET ORAL at 10:19

## 2023-10-07 RX ADMIN — MOMETASONE FUROATE AND FORMOTEROL FUMARATE DIHYDRATE 2 PUFF: 200; 5 AEROSOL RESPIRATORY (INHALATION) at 19:25

## 2023-10-07 RX ADMIN — METOPROLOL TARTRATE 100 MG: 50 TABLET ORAL at 10:19

## 2023-10-07 RX ADMIN — SODIUM CHLORIDE, PRESERVATIVE FREE 10 ML: 5 INJECTION INTRAVENOUS at 10:21

## 2023-10-07 RX ADMIN — FENOFIBRATE 160 MG: 160 TABLET ORAL at 10:18

## 2023-10-07 RX ADMIN — SODIUM CHLORIDE, PRESERVATIVE FREE 5 ML: 5 INJECTION INTRAVENOUS at 21:20

## 2023-10-07 RX ADMIN — MONTELUKAST 10 MG: 10 TABLET, FILM COATED ORAL at 19:49

## 2023-10-07 RX ADMIN — MOMETASONE FUROATE AND FORMOTEROL FUMARATE DIHYDRATE 2 PUFF: 200; 5 AEROSOL RESPIRATORY (INHALATION) at 07:59

## 2023-10-07 RX ADMIN — LORAZEPAM 1 MG: 1 TABLET ORAL at 13:05

## 2023-10-07 RX ADMIN — TIOTROPIUM BROMIDE INHALATION SPRAY 2 PUFF: 3.12 SPRAY, METERED RESPIRATORY (INHALATION) at 07:59

## 2023-10-07 RX ADMIN — LEVETIRACETAM 500 MG: 5 INJECTION, SOLUTION INTRAVENOUS at 15:14

## 2023-10-07 RX ADMIN — LEVETIRACETAM 500 MG: 5 INJECTION, SOLUTION INTRAVENOUS at 02:54

## 2023-10-07 RX ADMIN — ATORVASTATIN CALCIUM 40 MG: 40 TABLET, FILM COATED ORAL at 10:19

## 2023-10-07 RX ADMIN — HYDROMORPHONE HYDROCHLORIDE 4 MG: 4 TABLET ORAL at 21:52

## 2023-10-07 RX ADMIN — PREDNISONE 2 MG: 1 TABLET ORAL at 10:24

## 2023-10-07 ASSESSMENT — PAIN DESCRIPTION - LOCATION: LOCATION: GENERALIZED

## 2023-10-07 ASSESSMENT — PAIN SCALES - GENERAL
PAINLEVEL_OUTOF10: 0
PAINLEVEL_OUTOF10: 9

## 2023-10-07 ASSESSMENT — PAIN DESCRIPTION - DESCRIPTORS: DESCRIPTORS: ACHING

## 2023-10-07 NOTE — PLAN OF CARE
Problem: Discharge Planning  Goal: Discharge to home or other facility with appropriate resources  Outcome: Progressing  Flowsheets (Taken 10/6/2023 1718 by William Traylor RN)  Discharge to home or other facility with appropriate resources:   Identify barriers to discharge with patient and caregiver   Identify discharge learning needs (meds, wound care, etc)   Refer to discharge planning if patient needs post-hospital services based on physician order or complex needs related to functional status, cognitive ability or social support system   Arrange for interpreters to assist at discharge as needed   Arrange for needed discharge resources and transportation as appropriate     Problem: Pain  Goal: Verbalizes/displays adequate comfort level or baseline comfort level  Outcome: Progressing  Flowsheets (Taken 10/6/2023 1700 by William Traylor RN)  Verbalizes/displays adequate comfort level or baseline comfort level:   Encourage patient to monitor pain and request assistance   Assess pain using appropriate pain scale   Administer analgesics based on type and severity of pain and evaluate response   Implement non-pharmacological measures as appropriate and evaluate response   Consider cultural and social influences on pain and pain management   Notify Licensed Independent Practitioner if interventions unsuccessful or patient reports new pain     Problem: Safety - Adult  Goal: Free from fall injury  Outcome: Progressing     Problem: ABCDS Injury Assessment  Goal: Absence of physical injury  Outcome: Progressing     Problem: Skin/Tissue Integrity  Goal: Absence of new skin breakdown  Description: 1. Monitor for areas of redness and/or skin breakdown  2. Assess vascular access sites hourly  3. Every 4-6 hours minimum:  Change oxygen saturation probe site  4.   Every 4-6 hours:  If on nasal continuous positive airway pressure, respiratory therapy assess nares and determine need for appliance change or resting

## 2023-10-08 LAB
ALBUMIN SERPL-MCNC: 3.2 G/DL (ref 3.4–5)
ANION GAP SERPL CALCULATED.3IONS-SCNC: 11 MMOL/L (ref 3–16)
ANION GAP SERPL CALCULATED.3IONS-SCNC: 9 MMOL/L (ref 3–16)
BUN SERPL-MCNC: 25 MG/DL (ref 7–20)
BUN SERPL-MCNC: 26 MG/DL (ref 7–20)
BUN SERPL-MCNC: 26 MG/DL (ref 7–20)
BUN SERPL-MCNC: 27 MG/DL (ref 7–20)
CALCIUM SERPL-MCNC: 9.1 MG/DL (ref 8.3–10.6)
CALCIUM SERPL-MCNC: 9.1 MG/DL (ref 8.3–10.6)
CALCIUM SERPL-MCNC: 9.2 MG/DL (ref 8.3–10.6)
CALCIUM SERPL-MCNC: 9.4 MG/DL (ref 8.3–10.6)
CHLORIDE SERPL-SCNC: 92 MMOL/L (ref 99–110)
CHLORIDE SERPL-SCNC: 92 MMOL/L (ref 99–110)
CHLORIDE SERPL-SCNC: 93 MMOL/L (ref 99–110)
CHLORIDE SERPL-SCNC: 96 MMOL/L (ref 99–110)
CO2 SERPL-SCNC: 21 MMOL/L (ref 21–32)
CO2 SERPL-SCNC: 22 MMOL/L (ref 21–32)
CO2 SERPL-SCNC: 23 MMOL/L (ref 21–32)
CO2 SERPL-SCNC: 25 MMOL/L (ref 21–32)
CORTIS AM PEAK SERPL-MCNC: 8.1 UG/DL (ref 4.3–22.4)
CREAT SERPL-MCNC: 0.7 MG/DL (ref 0.6–1.2)
CREAT SERPL-MCNC: 0.8 MG/DL (ref 0.6–1.2)
GFR SERPLBLD CREATININE-BSD FMLA CKD-EPI: >60 ML/MIN/{1.73_M2}
GLUCOSE SERPL-MCNC: 112 MG/DL (ref 70–99)
GLUCOSE SERPL-MCNC: 115 MG/DL (ref 70–99)
GLUCOSE SERPL-MCNC: 118 MG/DL (ref 70–99)
GLUCOSE SERPL-MCNC: 95 MG/DL (ref 70–99)
OSMOLALITY SERPL: 266 MOSM/KG (ref 280–301)
OSMOLALITY SERPL: 285 MOSM/KG (ref 280–301)
OSMOLALITY UR: 433 MOSM/KG (ref 390–1070)
PHOSPHATE SERPL-MCNC: 3 MG/DL (ref 2.5–4.9)
POTASSIUM SERPL-SCNC: 3.6 MMOL/L (ref 3.5–5.1)
POTASSIUM SERPL-SCNC: 3.7 MMOL/L (ref 3.5–5.1)
POTASSIUM SERPL-SCNC: 3.7 MMOL/L (ref 3.5–5.1)
POTASSIUM SERPL-SCNC: 4 MMOL/L (ref 3.5–5.1)
SODIUM SERPL-SCNC: 126 MMOL/L (ref 136–145)
SODIUM SERPL-SCNC: 128 MMOL/L (ref 136–145)

## 2023-10-08 PROCEDURE — 83935 ASSAY OF URINE OSMOLALITY: CPT

## 2023-10-08 PROCEDURE — 94760 N-INVAS EAR/PLS OXIMETRY 1: CPT

## 2023-10-08 PROCEDURE — 51702 INSERT TEMP BLADDER CATH: CPT

## 2023-10-08 PROCEDURE — 6360000002 HC RX W HCPCS: Performed by: STUDENT IN AN ORGANIZED HEALTH CARE EDUCATION/TRAINING PROGRAM

## 2023-10-08 PROCEDURE — 2580000003 HC RX 258: Performed by: STUDENT IN AN ORGANIZED HEALTH CARE EDUCATION/TRAINING PROGRAM

## 2023-10-08 PROCEDURE — 84295 ASSAY OF SERUM SODIUM: CPT

## 2023-10-08 PROCEDURE — 6370000000 HC RX 637 (ALT 250 FOR IP): Performed by: STUDENT IN AN ORGANIZED HEALTH CARE EDUCATION/TRAINING PROGRAM

## 2023-10-08 PROCEDURE — 6370000000 HC RX 637 (ALT 250 FOR IP): Performed by: FAMILY MEDICINE

## 2023-10-08 PROCEDURE — 36415 COLL VENOUS BLD VENIPUNCTURE: CPT

## 2023-10-08 PROCEDURE — 82533 TOTAL CORTISOL: CPT

## 2023-10-08 PROCEDURE — 80069 RENAL FUNCTION PANEL: CPT

## 2023-10-08 PROCEDURE — 2580000003 HC RX 258: Performed by: REGISTERED NURSE

## 2023-10-08 PROCEDURE — 94640 AIRWAY INHALATION TREATMENT: CPT

## 2023-10-08 PROCEDURE — 2700000000 HC OXYGEN THERAPY PER DAY

## 2023-10-08 PROCEDURE — 83930 ASSAY OF BLOOD OSMOLALITY: CPT

## 2023-10-08 PROCEDURE — 2000000000 HC ICU R&B

## 2023-10-08 RX ORDER — CETIRIZINE HYDROCHLORIDE 10 MG/1
10 TABLET ORAL DAILY
Status: DISCONTINUED | OUTPATIENT
Start: 2023-10-08 | End: 2023-10-10 | Stop reason: HOSPADM

## 2023-10-08 RX ORDER — AMLODIPINE BESYLATE 5 MG/1
5 TABLET ORAL DAILY
Status: DISCONTINUED | OUTPATIENT
Start: 2023-10-08 | End: 2023-10-10 | Stop reason: HOSPADM

## 2023-10-08 RX ORDER — PANTOPRAZOLE SODIUM 40 MG/1
40 TABLET, DELAYED RELEASE ORAL
Status: DISCONTINUED | OUTPATIENT
Start: 2023-10-08 | End: 2023-10-10 | Stop reason: HOSPADM

## 2023-10-08 RX ORDER — 0.9 % SODIUM CHLORIDE 0.9 %
500 INTRAVENOUS SOLUTION INTRAVENOUS ONCE
Status: COMPLETED | OUTPATIENT
Start: 2023-10-08 | End: 2023-10-08

## 2023-10-08 RX ADMIN — LEVETIRACETAM 500 MG: 5 INJECTION, SOLUTION INTRAVENOUS at 14:37

## 2023-10-08 RX ADMIN — AMLODIPINE BESYLATE 10 MG: 10 TABLET ORAL at 09:37

## 2023-10-08 RX ADMIN — LEVETIRACETAM 500 MG: 5 INJECTION, SOLUTION INTRAVENOUS at 02:09

## 2023-10-08 RX ADMIN — HYDROMORPHONE HYDROCHLORIDE 4 MG: 4 TABLET ORAL at 02:01

## 2023-10-08 RX ADMIN — MOMETASONE FUROATE AND FORMOTEROL FUMARATE DIHYDRATE 2 PUFF: 200; 5 AEROSOL RESPIRATORY (INHALATION) at 08:07

## 2023-10-08 RX ADMIN — TIOTROPIUM BROMIDE INHALATION SPRAY 2 PUFF: 3.12 SPRAY, METERED RESPIRATORY (INHALATION) at 08:07

## 2023-10-08 RX ADMIN — SODIUM CHLORIDE, PRESERVATIVE FREE 10 ML: 5 INJECTION INTRAVENOUS at 21:20

## 2023-10-08 RX ADMIN — MOMETASONE FUROATE AND FORMOTEROL FUMARATE DIHYDRATE 2 PUFF: 200; 5 AEROSOL RESPIRATORY (INHALATION) at 19:31

## 2023-10-08 RX ADMIN — SODIUM CHLORIDE: 9 INJECTION, SOLUTION INTRAVENOUS at 05:38

## 2023-10-08 RX ADMIN — ATORVASTATIN CALCIUM 40 MG: 40 TABLET, FILM COATED ORAL at 09:37

## 2023-10-08 RX ADMIN — CETIRIZINE HYDROCHLORIDE 10 MG: 10 TABLET, FILM COATED ORAL at 14:37

## 2023-10-08 RX ADMIN — METOPROLOL TARTRATE 100 MG: 50 TABLET ORAL at 09:37

## 2023-10-08 RX ADMIN — MONTELUKAST 10 MG: 10 TABLET, FILM COATED ORAL at 21:20

## 2023-10-08 RX ADMIN — SODIUM CHLORIDE 500 ML: 9 INJECTION, SOLUTION INTRAVENOUS at 02:06

## 2023-10-08 RX ADMIN — PREDNISONE 2 MG: 1 TABLET ORAL at 09:37

## 2023-10-08 RX ADMIN — FENOFIBRATE 160 MG: 160 TABLET ORAL at 09:37

## 2023-10-08 RX ADMIN — PANTOPRAZOLE SODIUM 40 MG: 40 TABLET, DELAYED RELEASE ORAL at 14:37

## 2023-10-08 RX ADMIN — HYDROMORPHONE HYDROCHLORIDE 4 MG: 4 TABLET ORAL at 11:55

## 2023-10-08 RX ADMIN — HYDROMORPHONE HYDROCHLORIDE 4 MG: 4 TABLET ORAL at 22:16

## 2023-10-08 RX ADMIN — POLYETHYLENE GLYCOL 3350 17 G: 17 POWDER, FOR SOLUTION ORAL at 17:44

## 2023-10-08 ASSESSMENT — PAIN DESCRIPTION - ORIENTATION
ORIENTATION: RIGHT;LEFT
ORIENTATION: RIGHT;LEFT
ORIENTATION: LEFT;RIGHT
ORIENTATION: LEFT;RIGHT

## 2023-10-08 ASSESSMENT — PAIN DESCRIPTION - FREQUENCY
FREQUENCY: CONTINUOUS

## 2023-10-08 ASSESSMENT — PAIN SCALES - GENERAL
PAINLEVEL_OUTOF10: 0
PAINLEVEL_OUTOF10: 9
PAINLEVEL_OUTOF10: 6
PAINLEVEL_OUTOF10: 5
PAINLEVEL_OUTOF10: 7
PAINLEVEL_OUTOF10: 6
PAINLEVEL_OUTOF10: 6

## 2023-10-08 ASSESSMENT — PAIN DESCRIPTION - DESCRIPTORS
DESCRIPTORS: ACHING;DISCOMFORT
DESCRIPTORS: ACHING
DESCRIPTORS: ACHING
DESCRIPTORS: ACHING;DISCOMFORT
DESCRIPTORS: ACHING;DISCOMFORT
DESCRIPTORS: ACHING

## 2023-10-08 ASSESSMENT — PAIN DESCRIPTION - ONSET
ONSET: GRADUAL
ONSET: ON-GOING
ONSET: GRADUAL

## 2023-10-08 ASSESSMENT — PAIN DESCRIPTION - LOCATION
LOCATION: LEG
LOCATION: GENERALIZED
LOCATION: LEG
LOCATION: LEG

## 2023-10-08 ASSESSMENT — PAIN DESCRIPTION - PAIN TYPE
TYPE: CHRONIC PAIN

## 2023-10-08 ASSESSMENT — PAIN - FUNCTIONAL ASSESSMENT: PAIN_FUNCTIONAL_ASSESSMENT: PREVENTS OR INTERFERES SOME ACTIVE ACTIVITIES AND ADLS

## 2023-10-08 NOTE — PLAN OF CARE
Problem: Discharge Planning  Goal: Discharge to home or other facility with appropriate resources  Outcome: Progressing  Flowsheets (Taken 10/8/2023 0800)  Discharge to home or other facility with appropriate resources:   Identify barriers to discharge with patient and caregiver   Arrange for needed discharge resources and transportation as appropriate   Identify discharge learning needs (meds, wound care, etc)   Refer to discharge planning if patient needs post-hospital services based on physician order or complex needs related to functional status, cognitive ability or social support system   Arrange for interpreters to assist at discharge as needed     Problem: Pain  Goal: Verbalizes/displays adequate comfort level or baseline comfort level  Outcome: Progressing  Flowsheets  Taken 10/8/2023 1600  Verbalizes/displays adequate comfort level or baseline comfort level:   Encourage patient to monitor pain and request assistance   Assess pain using appropriate pain scale   Administer analgesics based on type and severity of pain and evaluate response   Implement non-pharmacological measures as appropriate and evaluate response   Consider cultural and social influences on pain and pain management   Notify Licensed Independent Practitioner if interventions unsuccessful or patient reports new pain  Taken 10/8/2023 1200  Verbalizes/displays adequate comfort level or baseline comfort level:   Encourage patient to monitor pain and request assistance   Assess pain using appropriate pain scale   Administer analgesics based on type and severity of pain and evaluate response   Implement non-pharmacological measures as appropriate and evaluate response   Notify Licensed Independent Practitioner if interventions unsuccessful or patient reports new pain  Taken 10/8/2023 0800  Verbalizes/displays adequate comfort level or baseline comfort level:   Encourage patient to monitor pain and request assistance   Assess pain using

## 2023-10-09 LAB
ANION GAP SERPL CALCULATED.3IONS-SCNC: 10 MMOL/L (ref 3–16)
BUN SERPL-MCNC: 22 MG/DL (ref 7–20)
CALCIUM SERPL-MCNC: 8.9 MG/DL (ref 8.3–10.6)
CHLORIDE SERPL-SCNC: 95 MMOL/L (ref 99–110)
CO2 SERPL-SCNC: 23 MMOL/L (ref 21–32)
CREAT SERPL-MCNC: 0.6 MG/DL (ref 0.6–1.2)
GFR SERPLBLD CREATININE-BSD FMLA CKD-EPI: >60 ML/MIN/{1.73_M2}
GLUCOSE SERPL-MCNC: 111 MG/DL (ref 70–99)
POTASSIUM SERPL-SCNC: 3.5 MMOL/L (ref 3.5–5.1)
SODIUM SERPL-SCNC: 128 MMOL/L (ref 136–145)
SODIUM SERPL-SCNC: 130 MMOL/L (ref 136–145)
SODIUM SERPL-SCNC: 130 MMOL/L (ref 136–145)

## 2023-10-09 PROCEDURE — 94760 N-INVAS EAR/PLS OXIMETRY 1: CPT

## 2023-10-09 PROCEDURE — 97530 THERAPEUTIC ACTIVITIES: CPT

## 2023-10-09 PROCEDURE — 2580000003 HC RX 258: Performed by: REGISTERED NURSE

## 2023-10-09 PROCEDURE — 94640 AIRWAY INHALATION TREATMENT: CPT

## 2023-10-09 PROCEDURE — 97166 OT EVAL MOD COMPLEX 45 MIN: CPT

## 2023-10-09 PROCEDURE — 97535 SELF CARE MNGMENT TRAINING: CPT

## 2023-10-09 PROCEDURE — 97116 GAIT TRAINING THERAPY: CPT

## 2023-10-09 PROCEDURE — 84295 ASSAY OF SERUM SODIUM: CPT

## 2023-10-09 PROCEDURE — 6360000002 HC RX W HCPCS: Performed by: STUDENT IN AN ORGANIZED HEALTH CARE EDUCATION/TRAINING PROGRAM

## 2023-10-09 PROCEDURE — 36415 COLL VENOUS BLD VENIPUNCTURE: CPT

## 2023-10-09 PROCEDURE — 6370000000 HC RX 637 (ALT 250 FOR IP): Performed by: FAMILY MEDICINE

## 2023-10-09 PROCEDURE — 6370000000 HC RX 637 (ALT 250 FOR IP): Performed by: STUDENT IN AN ORGANIZED HEALTH CARE EDUCATION/TRAINING PROGRAM

## 2023-10-09 PROCEDURE — 80048 BASIC METABOLIC PNL TOTAL CA: CPT

## 2023-10-09 PROCEDURE — 2000000000 HC ICU R&B

## 2023-10-09 RX ADMIN — METOPROLOL TARTRATE 100 MG: 50 TABLET ORAL at 08:56

## 2023-10-09 RX ADMIN — SODIUM CHLORIDE, PRESERVATIVE FREE 10 ML: 5 INJECTION INTRAVENOUS at 20:38

## 2023-10-09 RX ADMIN — FENOFIBRATE 160 MG: 160 TABLET ORAL at 08:58

## 2023-10-09 RX ADMIN — ATORVASTATIN CALCIUM 40 MG: 40 TABLET, FILM COATED ORAL at 08:56

## 2023-10-09 RX ADMIN — MONTELUKAST 10 MG: 10 TABLET, FILM COATED ORAL at 20:38

## 2023-10-09 RX ADMIN — PREDNISONE 2 MG: 1 TABLET ORAL at 08:59

## 2023-10-09 RX ADMIN — MOMETASONE FUROATE AND FORMOTEROL FUMARATE DIHYDRATE 2 PUFF: 200; 5 AEROSOL RESPIRATORY (INHALATION) at 20:43

## 2023-10-09 RX ADMIN — SODIUM CHLORIDE, PRESERVATIVE FREE 10 ML: 5 INJECTION INTRAVENOUS at 09:01

## 2023-10-09 RX ADMIN — PANTOPRAZOLE SODIUM 40 MG: 40 TABLET, DELAYED RELEASE ORAL at 08:56

## 2023-10-09 RX ADMIN — MOMETASONE FUROATE AND FORMOTEROL FUMARATE DIHYDRATE 2 PUFF: 200; 5 AEROSOL RESPIRATORY (INHALATION) at 08:08

## 2023-10-09 RX ADMIN — LEVETIRACETAM 500 MG: 5 INJECTION, SOLUTION INTRAVENOUS at 02:27

## 2023-10-09 RX ADMIN — HYDROMORPHONE HYDROCHLORIDE 4 MG: 4 TABLET ORAL at 20:38

## 2023-10-09 RX ADMIN — CETIRIZINE HYDROCHLORIDE 10 MG: 10 TABLET, FILM COATED ORAL at 08:56

## 2023-10-09 RX ADMIN — TIOTROPIUM BROMIDE INHALATION SPRAY 2 PUFF: 3.12 SPRAY, METERED RESPIRATORY (INHALATION) at 08:08

## 2023-10-09 ASSESSMENT — PAIN SCALES - GENERAL
PAINLEVEL_OUTOF10: 8
PAINLEVEL_OUTOF10: 5
PAINLEVEL_OUTOF10: 6

## 2023-10-09 ASSESSMENT — PAIN DESCRIPTION - ORIENTATION
ORIENTATION: RIGHT;LEFT

## 2023-10-09 ASSESSMENT — PAIN - FUNCTIONAL ASSESSMENT
PAIN_FUNCTIONAL_ASSESSMENT: PREVENTS OR INTERFERES SOME ACTIVE ACTIVITIES AND ADLS

## 2023-10-09 ASSESSMENT — PAIN DESCRIPTION - ONSET
ONSET: ON-GOING
ONSET: ON-GOING

## 2023-10-09 ASSESSMENT — PAIN DESCRIPTION - PAIN TYPE
TYPE: CHRONIC PAIN
TYPE: CHRONIC PAIN

## 2023-10-09 ASSESSMENT — PAIN DESCRIPTION - DESCRIPTORS
DESCRIPTORS: ACHING;DISCOMFORT
DESCRIPTORS: ACHING;CRAMPING;THROBBING
DESCRIPTORS: ACHING;DISCOMFORT

## 2023-10-09 ASSESSMENT — PAIN DESCRIPTION - FREQUENCY
FREQUENCY: CONTINUOUS
FREQUENCY: CONTINUOUS

## 2023-10-09 ASSESSMENT — PAIN DESCRIPTION - LOCATION
LOCATION: LEG

## 2023-10-09 NOTE — ACP (ADVANCE CARE PLANNING)
Advance Care Planning     Advance Care Planning Inpatient Note  [facility] Department    Today's Date: 10/9/2023  Unit: [facility] 2W ICU    Received request from rounding. Upon review of chart and communication with care team, patient's decision making abilities are not in question. . Patient was/were present in the room during visit. Goals of ACP Conversation:  Discuss advance care planning documents  Facilitate a discussion related to patient's goals of care as they align with the patient's values and beliefs. Health Care Decision Makers:       Primary Decision Maker: Shayne Serrato - [phone]  Summary:  Updated Healthcare Decision Maker  Documented Next of Kin, per patient report    Advance Care Planning Documents (Patient Wishes):  None     Assessment:   met with patient to discuss ACP and complete documentation. Patient has ACP docs per patient. Requested copy from patient/family for our review/records. All patient's questioned answered. Patient encouraged to call [address] at [facility] if questions arise.       Interventions:  Requested patient/family to submit existing document for our records: Healthcare Power of /Advance Directive Appointment of Glenys Porter/Advance Directive  Encouraged ongoing ACP conversation with future decision makers and loved ones    Care Preferences Communicated:   No    Outcomes/Plan:  ACP Discussion: Completed    Electronically signed by Eileen De León on 10/9/2023 at 1:52 PM

## 2023-10-09 NOTE — CARE COORDINATION
Case Management Assessment  Initial Evaluation    Date/Time of Evaluation: 10/9/2023 11:31 AM  Assessment Completed by: MAYTE Howard    If patient is discharged prior to next notation, then this note serves as note for discharge by case management. Patient Name: Nerissa Fothergill                   YOB: 1955  Diagnosis: Hyponatremia [E87.1]  SDH (subdural hematoma) ([address]) [S06. 5XAA]  URI (upper respiratory infection) [J06.9]  Subdural bleeding ([address]) [I62.00]  Altered mental status, unspecified altered mental status type [R41.82]                   Date / Time: 10/6/2023 11:53 AM    Patient Admission Status: Inpatient   Readmission Risk (Low < 19, Mod (19-27), High > 27): Readmission Risk Score: 15.2    Current PCP: Colletta Helling, MD  PCP verified by CM? Yes    Chart Reviewed: Yes      History Provided by: Patient  Patient Orientation: Alert and Oriented    Patient Cognition: Alert    Hospitalization in the last 30 days (Readmission):  No    If yes, Readmission Assessment in CM Navigator will be completed. Advance Directives:      Code Status: Full Code   Patient's Primary Decision Maker is: Named in Scanned ACP Document      Discharge Planning:    Patient lives with: Alone Type of Home: House  Primary Care Giver: Self  Patient Support Systems include: Family Members   Current Financial resources:    Current community resources:    Current services prior to admission: None            Current DME:              Type of Home Care services:  None    ADLS  Prior functional level: Independent in ADLs/IADLs  Current functional level: Independent in ADLs/IADLs    PT AM-PAC: 9 /24  OT AM-PAC: 16 /24    Family can provide assistance at DC: No  Would you like Case Management to discuss the discharge plan with any other family members/significant others, and if so, who?  No  Plans to Return to Present Housing: Unknown at present  Other Identified Issues/Barriers to RETURNING to current housing:

## 2023-10-09 NOTE — CONSULTS
[address], [address]                                  CONSULTATION    PATIENT NAME: Debbie Catalan                      :        1955  MED REC NO:   [id number]                          ROOM:       2117  ACCOUNT NO:   [de-identified]                           ADMIT DATE: 10/06/2023  PROVIDER:     Vinod Graham MD    CONSULT DATE:  10/06/2023    REASON FOR CONSULTATION:  Hyponatremia. HISTORY OF PRESENTING ILLNESS:  A 59-year-old  female with past  medical history significant for obesity, hypertension, chronic back  pain, diastolic heart failure, hypercholesterolemia, chronic pain  syndrome, found down and brought to the emergency room. The patient had  a CT scan of the head that showed bilateral subdural hematoma. The  patient was admitted in ICU. A renal consultation has been called for  hyponatremia and creatinine of 1.2. At the time of consultation, the patient is drowsy and sleepy. Most of  the information obtained from the records. She has received some IV  fluids in the emergency room. PAST MEDICAL HISTORY:  1. Chronic inflammatory demyelinating polyneuropathy. 2.  Asthma. 3.  Hypertension. 4.  Obesity. 5.  Obstructive sleep apnea. 6.  Chronic pain syndrome. PAST SURGICAL HISTORY:  1.  Status post foot surgery. 2.  Status post gastric bypass. MEDICATIONS:  Include olmesartan, Proventil, ferrous sulfate, Lasix,  Lopressor, Zocor, Spiriva, and prednisone. ALLERGIES:  IMMUNOGLOBULIN, BACTRIM, CELLCEPT, CORTISONE, DILTIAZEM,  HYDRALAZINE, IMURAN, IODINE, MORPHINE. FAMILY HISTORY:  Not significant for any kidney disease. SOCIAL HISTORY:  Per record. Former smoker. REVIEW OF SYSTEMS:  Unobtainable. PHYSICAL EXAMINATION:  GENERAL:  She is lying in bed, looks comfortable. VITAL SIGNS:  Blood pressure 108/70, pulse 112, temperature 98.9.   HEENT:  Pupils reactive to
palpable masses. Skin: Normal temperature and turgor. No rashes or breakdown noted on exposed areas. Ext: No significant edema appreciated. No varicosities. MSK: No joint tenderness, erythema, warmth noted. AROM intact. Neuro: Alert, oriented, evidence of impaired recall. No cranial nerve deficits appreciated. Sensation intact to light touch but diminsihed in distal extremities up to elbows and knees. Motor examination reveals strength 5-/5 in BUE except 4/5  and finger abduction, 4/5 BLE except 3/5 hip flexion. Evidence of sensory ataxia. Reflexes absent  Psych: Stable mood, normal judgement, normal affect     Lab Results   Component Value Date    WBC 9.8 10/07/2023    HGB 13.6 10/07/2023    HCT 40.8 10/07/2023    MCV 84.1 10/07/2023     (H) 10/07/2023     Lab Results   Component Value Date    INR 0.98 06/26/2014    PROTIME 11.0 06/26/2014     Lab Results   Component Value Date    CREATININE 0.6 10/09/2023    BUN 22 (H) 10/09/2023     (L) 10/09/2023    K 3.5 10/09/2023    CL 95 (L) 10/09/2023    CO2 23 10/09/2023     Lab Results   Component Value Date    ALT 18 10/07/2023    AST 37 10/07/2023    ALKPHOS 71 10/07/2023    BILITOT 0.4 10/07/2023       Most recent echocardiogram revealed:   Normal left ventricle size, wall thickness and systolic function with an    estimated ejection fraction of 55%. No regional wall motion abnormalities are seen. The right ventricle is normal in size and function.      Most recent EKG revealed:  Sinus rhythm with sinus arrhythmia with 1st degree A-V block with occasional Premature ventricular complexesLeft axis deviationInferior infarct , age undeterminedAnterolateral infarct (cited on or before 23-DEC-2013)Abnormal ECGWhen compared with ECG of 06-OCT-2023 16:49, (unconfirmed)Questionable change in initial forces of Septal leadsConfirmed by Roldan Mccain ([id number]) on 10/7/2023 3:40:25 PM     Most recent imaging studies revealed:    MRI/MRA brain  Chronic
[facility] Neurology    A copy of this note was provided for Dr Marialuisa Xie MD

## 2023-10-10 ENCOUNTER — HOSPITAL ENCOUNTER (INPATIENT)
Age: 68
DRG: 949 | End: 2023-10-10
Attending: PHYSICAL MEDICINE & REHABILITATION | Admitting: PHYSICAL MEDICINE & REHABILITATION
Payer: MEDICARE

## 2023-10-10 VITALS
DIASTOLIC BLOOD PRESSURE: 79 MMHG | OXYGEN SATURATION: 96 % | HEIGHT: 63 IN | SYSTOLIC BLOOD PRESSURE: 117 MMHG | TEMPERATURE: 97.8 F | HEART RATE: 74 BPM | RESPIRATION RATE: 21 BRPM | BODY MASS INDEX: 31.21 KG/M2 | WEIGHT: 176.15 LBS

## 2023-10-10 DIAGNOSIS — J06.9 URI (UPPER RESPIRATORY INFECTION): ICD-10-CM

## 2023-10-10 PROBLEM — S06.5XAA SUBDURAL HEMATOMA (HCC): Status: ACTIVE | Noted: 2023-10-10

## 2023-10-10 LAB
ANION GAP SERPL CALCULATED.3IONS-SCNC: 16 MMOL/L (ref 3–16)
BUN SERPL-MCNC: 21 MG/DL (ref 7–20)
CALCIUM SERPL-MCNC: 9.4 MG/DL (ref 8.3–10.6)
CHLORIDE SERPL-SCNC: 97 MMOL/L (ref 99–110)
CO2 SERPL-SCNC: 21 MMOL/L (ref 21–32)
CREAT SERPL-MCNC: 0.7 MG/DL (ref 0.6–1.2)
GFR SERPLBLD CREATININE-BSD FMLA CKD-EPI: >60 ML/MIN/{1.73_M2}
GLUCOSE SERPL-MCNC: 103 MG/DL (ref 70–99)
POTASSIUM SERPL-SCNC: 4.7 MMOL/L (ref 3.5–5.1)
SODIUM SERPL-SCNC: 129 MMOL/L (ref 136–145)
SODIUM SERPL-SCNC: 131 MMOL/L (ref 136–145)
SODIUM SERPL-SCNC: 134 MMOL/L (ref 136–145)

## 2023-10-10 PROCEDURE — 1280000000 HC REHAB R&B

## 2023-10-10 PROCEDURE — 97530 THERAPEUTIC ACTIVITIES: CPT

## 2023-10-10 PROCEDURE — 6370000000 HC RX 637 (ALT 250 FOR IP): Performed by: INTERNAL MEDICINE

## 2023-10-10 PROCEDURE — 6370000000 HC RX 637 (ALT 250 FOR IP): Performed by: FAMILY MEDICINE

## 2023-10-10 PROCEDURE — 84295 ASSAY OF SERUM SODIUM: CPT

## 2023-10-10 PROCEDURE — 6370000000 HC RX 637 (ALT 250 FOR IP): Performed by: PHYSICAL MEDICINE & REHABILITATION

## 2023-10-10 PROCEDURE — 80048 BASIC METABOLIC PNL TOTAL CA: CPT

## 2023-10-10 PROCEDURE — 94640 AIRWAY INHALATION TREATMENT: CPT

## 2023-10-10 PROCEDURE — 94760 N-INVAS EAR/PLS OXIMETRY 1: CPT

## 2023-10-10 PROCEDURE — 94150 VITAL CAPACITY TEST: CPT

## 2023-10-10 PROCEDURE — 36415 COLL VENOUS BLD VENIPUNCTURE: CPT

## 2023-10-10 PROCEDURE — 2580000003 HC RX 258: Performed by: PHYSICAL MEDICINE & REHABILITATION

## 2023-10-10 PROCEDURE — 6370000000 HC RX 637 (ALT 250 FOR IP): Performed by: STUDENT IN AN ORGANIZED HEALTH CARE EDUCATION/TRAINING PROGRAM

## 2023-10-10 PROCEDURE — 97535 SELF CARE MNGMENT TRAINING: CPT

## 2023-10-10 PROCEDURE — 97116 GAIT TRAINING THERAPY: CPT

## 2023-10-10 PROCEDURE — 2580000003 HC RX 258: Performed by: REGISTERED NURSE

## 2023-10-10 PROCEDURE — 97110 THERAPEUTIC EXERCISES: CPT

## 2023-10-10 RX ORDER — ALBUTEROL SULFATE 1.25 MG/3ML
0.63 SOLUTION RESPIRATORY (INHALATION) EVERY 6 HOURS PRN
Status: CANCELLED | OUTPATIENT
Start: 2023-10-10

## 2023-10-10 RX ORDER — ONDANSETRON 4 MG/1
4 TABLET, ORALLY DISINTEGRATING ORAL EVERY 8 HOURS PRN
Status: DISCONTINUED | OUTPATIENT
Start: 2023-10-10 | End: 2023-10-17 | Stop reason: HOSPADM

## 2023-10-10 RX ORDER — FENOFIBRATE 160 MG/1
160 TABLET ORAL DAILY
Status: CANCELLED | OUTPATIENT
Start: 2023-10-11

## 2023-10-10 RX ORDER — SODIUM CHLORIDE 9 MG/ML
INJECTION, SOLUTION INTRAVENOUS PRN
Status: CANCELLED | OUTPATIENT
Start: 2023-10-10

## 2023-10-10 RX ORDER — MONTELUKAST SODIUM 10 MG/1
10 TABLET ORAL NIGHTLY
Status: CANCELLED | OUTPATIENT
Start: 2023-10-10

## 2023-10-10 RX ORDER — METOPROLOL TARTRATE 50 MG/1
100 TABLET, FILM COATED ORAL DAILY
Status: DISCONTINUED | OUTPATIENT
Start: 2023-10-11 | End: 2023-10-17 | Stop reason: HOSPADM

## 2023-10-10 RX ORDER — ONDANSETRON 4 MG/1
4 TABLET, ORALLY DISINTEGRATING ORAL EVERY 8 HOURS PRN
Status: CANCELLED | OUTPATIENT
Start: 2023-10-10

## 2023-10-10 RX ORDER — PANTOPRAZOLE SODIUM 40 MG/1
40 TABLET, DELAYED RELEASE ORAL
Status: DISCONTINUED | OUTPATIENT
Start: 2023-10-11 | End: 2023-10-17 | Stop reason: HOSPADM

## 2023-10-10 RX ORDER — BISACODYL 10 MG
10 SUPPOSITORY, RECTAL RECTAL DAILY PRN
Status: DISCONTINUED | OUTPATIENT
Start: 2023-10-10 | End: 2023-10-17 | Stop reason: HOSPADM

## 2023-10-10 RX ORDER — HYDROMORPHONE HYDROCHLORIDE 4 MG/1
4 TABLET ORAL EVERY 4 HOURS PRN
Status: CANCELLED | OUTPATIENT
Start: 2023-10-10

## 2023-10-10 RX ORDER — ROSUVASTATIN CALCIUM 10 MG/1
10 TABLET, COATED ORAL NIGHTLY
Status: DISCONTINUED | OUTPATIENT
Start: 2023-10-11 | End: 2023-10-13

## 2023-10-10 RX ORDER — SODIUM CHLORIDE 0.9 % (FLUSH) 0.9 %
5-40 SYRINGE (ML) INJECTION PRN
Status: DISCONTINUED | OUTPATIENT
Start: 2023-10-10 | End: 2023-10-17 | Stop reason: HOSPADM

## 2023-10-10 RX ORDER — CETIRIZINE HYDROCHLORIDE 10 MG/1
10 TABLET ORAL DAILY
Status: CANCELLED | OUTPATIENT
Start: 2023-10-11

## 2023-10-10 RX ORDER — METAXALONE 800 MG/1
800 TABLET ORAL 3 TIMES DAILY
COMMUNITY

## 2023-10-10 RX ORDER — AMLODIPINE BESYLATE 5 MG/1
5 TABLET ORAL DAILY
Status: CANCELLED | OUTPATIENT
Start: 2023-10-11

## 2023-10-10 RX ORDER — POLYETHYLENE GLYCOL 3350 17 G/17G
17 POWDER, FOR SOLUTION ORAL DAILY PRN
Status: CANCELLED | OUTPATIENT
Start: 2023-10-10

## 2023-10-10 RX ORDER — POLYETHYLENE GLYCOL 3350 17 G/17G
17 POWDER, FOR SOLUTION ORAL DAILY PRN
Status: DISCONTINUED | OUTPATIENT
Start: 2023-10-10 | End: 2023-10-17 | Stop reason: HOSPADM

## 2023-10-10 RX ORDER — ACETAMINOPHEN 325 MG/1
650 TABLET ORAL EVERY 6 HOURS PRN
Status: DISCONTINUED | OUTPATIENT
Start: 2023-10-10 | End: 2023-10-17 | Stop reason: HOSPADM

## 2023-10-10 RX ORDER — SENNA AND DOCUSATE SODIUM 50; 8.6 MG/1; MG/1
1 TABLET, FILM COATED ORAL 2 TIMES DAILY
Status: CANCELLED | OUTPATIENT
Start: 2023-10-10

## 2023-10-10 RX ORDER — ALBUTEROL SULFATE 1.25 MG/3ML
0.63 SOLUTION RESPIRATORY (INHALATION) EVERY 6 HOURS PRN
Status: DISCONTINUED | OUTPATIENT
Start: 2023-10-10 | End: 2023-10-17 | Stop reason: HOSPADM

## 2023-10-10 RX ORDER — PANTOPRAZOLE SODIUM 40 MG/1
40 TABLET, DELAYED RELEASE ORAL
Status: CANCELLED | OUTPATIENT
Start: 2023-10-11

## 2023-10-10 RX ORDER — SENNA AND DOCUSATE SODIUM 50; 8.6 MG/1; MG/1
1 TABLET, FILM COATED ORAL 2 TIMES DAILY
Status: DISCONTINUED | OUTPATIENT
Start: 2023-10-10 | End: 2023-10-17 | Stop reason: HOSPADM

## 2023-10-10 RX ORDER — SODIUM CHLORIDE 0.9 % (FLUSH) 0.9 %
5-40 SYRINGE (ML) INJECTION EVERY 12 HOURS SCHEDULED
Status: CANCELLED | OUTPATIENT
Start: 2023-10-10

## 2023-10-10 RX ORDER — SODIUM CHLORIDE 0.9 % (FLUSH) 0.9 %
5-40 SYRINGE (ML) INJECTION EVERY 12 HOURS SCHEDULED
Status: DISCONTINUED | OUTPATIENT
Start: 2023-10-10 | End: 2023-10-13

## 2023-10-10 RX ORDER — SODIUM CHLORIDE 0.9 % (FLUSH) 0.9 %
5-40 SYRINGE (ML) INJECTION PRN
Status: CANCELLED | OUTPATIENT
Start: 2023-10-10

## 2023-10-10 RX ORDER — AMLODIPINE BESYLATE 5 MG/1
5 TABLET ORAL DAILY
Status: DISCONTINUED | OUTPATIENT
Start: 2023-10-11 | End: 2023-10-17 | Stop reason: HOSPADM

## 2023-10-10 RX ORDER — MONTELUKAST SODIUM 10 MG/1
10 TABLET ORAL NIGHTLY
Status: DISCONTINUED | OUTPATIENT
Start: 2023-10-10 | End: 2023-10-17 | Stop reason: HOSPADM

## 2023-10-10 RX ORDER — HYDROMORPHONE HYDROCHLORIDE 4 MG/1
4 TABLET ORAL EVERY 4 HOURS PRN
Status: DISCONTINUED | OUTPATIENT
Start: 2023-10-10 | End: 2023-10-17 | Stop reason: HOSPADM

## 2023-10-10 RX ORDER — LANOLIN ALCOHOL/MO/W.PET/CERES
3 CREAM (GRAM) TOPICAL NIGHTLY PRN
Status: DISCONTINUED | OUTPATIENT
Start: 2023-10-10 | End: 2023-10-17 | Stop reason: HOSPADM

## 2023-10-10 RX ORDER — PREDNISONE 1 MG/1
2 TABLET ORAL DAILY
Status: CANCELLED | OUTPATIENT
Start: 2023-10-11

## 2023-10-10 RX ORDER — METOPROLOL TARTRATE 50 MG/1
100 TABLET, FILM COATED ORAL DAILY
Status: CANCELLED | OUTPATIENT
Start: 2023-10-11

## 2023-10-10 RX ORDER — ATORVASTATIN CALCIUM 40 MG/1
40 TABLET, FILM COATED ORAL DAILY
Status: CANCELLED | OUTPATIENT
Start: 2023-10-11

## 2023-10-10 RX ORDER — CLOPIDOGREL BISULFATE 75 MG/1
75 TABLET ORAL DAILY
Status: DISCONTINUED | OUTPATIENT
Start: 2023-10-18 | End: 2023-10-17 | Stop reason: HOSPADM

## 2023-10-10 RX ORDER — ONDANSETRON 2 MG/ML
4 INJECTION INTRAMUSCULAR; INTRAVENOUS EVERY 6 HOURS PRN
Status: CANCELLED | OUTPATIENT
Start: 2023-10-10

## 2023-10-10 RX ORDER — CLOPIDOGREL BISULFATE 75 MG/1
75 TABLET ORAL DAILY
Status: CANCELLED | OUTPATIENT
Start: 2023-10-18

## 2023-10-10 RX ORDER — SODIUM CHLORIDE 9 MG/ML
INJECTION, SOLUTION INTRAVENOUS PRN
Status: DISCONTINUED | OUTPATIENT
Start: 2023-10-10 | End: 2023-10-17 | Stop reason: HOSPADM

## 2023-10-10 RX ORDER — ACETAMINOPHEN 325 MG/1
650 TABLET ORAL EVERY 6 HOURS PRN
Status: CANCELLED | OUTPATIENT
Start: 2023-10-10

## 2023-10-10 RX ORDER — ATORVASTATIN CALCIUM 40 MG/1
40 TABLET, FILM COATED ORAL DAILY
Status: DISCONTINUED | OUTPATIENT
Start: 2023-10-11 | End: 2023-10-10

## 2023-10-10 RX ORDER — BISACODYL 10 MG
10 SUPPOSITORY, RECTAL RECTAL DAILY PRN
Status: CANCELLED | OUTPATIENT
Start: 2023-10-10

## 2023-10-10 RX ORDER — ONDANSETRON 2 MG/ML
4 INJECTION INTRAMUSCULAR; INTRAVENOUS EVERY 6 HOURS PRN
Status: DISCONTINUED | OUTPATIENT
Start: 2023-10-10 | End: 2023-10-17 | Stop reason: HOSPADM

## 2023-10-10 RX ORDER — FENOFIBRATE 160 MG/1
160 TABLET ORAL DAILY
Status: DISCONTINUED | OUTPATIENT
Start: 2023-10-11 | End: 2023-10-17 | Stop reason: HOSPADM

## 2023-10-10 RX ORDER — LANOLIN ALCOHOL/MO/W.PET/CERES
3 CREAM (GRAM) TOPICAL NIGHTLY PRN
Status: CANCELLED | OUTPATIENT
Start: 2023-10-10

## 2023-10-10 RX ORDER — PREDNISONE 1 MG/1
2 TABLET ORAL DAILY
Status: DISCONTINUED | OUTPATIENT
Start: 2023-10-11 | End: 2023-10-17 | Stop reason: HOSPADM

## 2023-10-10 RX ORDER — CETIRIZINE HYDROCHLORIDE 10 MG/1
10 TABLET ORAL DAILY
Status: DISCONTINUED | OUTPATIENT
Start: 2023-10-11 | End: 2023-10-17 | Stop reason: HOSPADM

## 2023-10-10 RX ADMIN — PREDNISONE 2 MG: 1 TABLET ORAL at 08:47

## 2023-10-10 RX ADMIN — HYDROMORPHONE HYDROCHLORIDE 4 MG: 4 TABLET ORAL at 17:37

## 2023-10-10 RX ADMIN — HYDROMORPHONE HYDROCHLORIDE 4 MG: 4 TABLET ORAL at 13:18

## 2023-10-10 RX ADMIN — CETIRIZINE HYDROCHLORIDE 10 MG: 10 TABLET, FILM COATED ORAL at 08:43

## 2023-10-10 RX ADMIN — PANTOPRAZOLE SODIUM 40 MG: 40 TABLET, DELAYED RELEASE ORAL at 08:42

## 2023-10-10 RX ADMIN — SODIUM CHLORIDE, PRESERVATIVE FREE 10 ML: 5 INJECTION INTRAVENOUS at 08:43

## 2023-10-10 RX ADMIN — MONTELUKAST 10 MG: 10 TABLET, FILM COATED ORAL at 21:09

## 2023-10-10 RX ADMIN — FENOFIBRATE 160 MG: 160 TABLET ORAL at 08:42

## 2023-10-10 RX ADMIN — TIOTROPIUM BROMIDE INHALATION SPRAY 2 PUFF: 3.12 SPRAY, METERED RESPIRATORY (INHALATION) at 07:46

## 2023-10-10 RX ADMIN — MOMETASONE FUROATE AND FORMOTEROL FUMARATE DIHYDRATE 2 PUFF: 200; 5 AEROSOL RESPIRATORY (INHALATION) at 19:53

## 2023-10-10 RX ADMIN — METOPROLOL TARTRATE 100 MG: 50 TABLET ORAL at 08:43

## 2023-10-10 RX ADMIN — SODIUM CHLORIDE, PRESERVATIVE FREE 10 ML: 5 INJECTION INTRAVENOUS at 22:07

## 2023-10-10 RX ADMIN — HYDROMORPHONE HYDROCHLORIDE 4 MG: 4 TABLET ORAL at 00:46

## 2023-10-10 RX ADMIN — HYDROMORPHONE HYDROCHLORIDE 4 MG: 4 TABLET ORAL at 08:50

## 2023-10-10 RX ADMIN — MOMETASONE FUROATE AND FORMOTEROL FUMARATE DIHYDRATE 2 PUFF: 200; 5 AEROSOL RESPIRATORY (INHALATION) at 07:46

## 2023-10-10 RX ADMIN — HYDROMORPHONE HYDROCHLORIDE 4 MG: 4 TABLET ORAL at 21:09

## 2023-10-10 RX ADMIN — AMLODIPINE BESYLATE 5 MG: 5 TABLET ORAL at 08:42

## 2023-10-10 ASSESSMENT — PAIN DESCRIPTION - ONSET
ONSET: ON-GOING

## 2023-10-10 ASSESSMENT — PAIN SCALES - GENERAL
PAINLEVEL_OUTOF10: 10
PAINLEVEL_OUTOF10: 6
PAINLEVEL_OUTOF10: 9
PAINLEVEL_OUTOF10: 6
PAINLEVEL_OUTOF10: 0
PAINLEVEL_OUTOF10: 7
PAINLEVEL_OUTOF10: 3
PAINLEVEL_OUTOF10: 10
PAINLEVEL_OUTOF10: 4
PAINLEVEL_OUTOF10: 10
PAINLEVEL_OUTOF10: 8

## 2023-10-10 ASSESSMENT — PAIN DESCRIPTION - ORIENTATION
ORIENTATION: RIGHT;LEFT

## 2023-10-10 ASSESSMENT — PAIN - FUNCTIONAL ASSESSMENT
PAIN_FUNCTIONAL_ASSESSMENT: PREVENTS OR INTERFERES SOME ACTIVE ACTIVITIES AND ADLS
PAIN_FUNCTIONAL_ASSESSMENT: PREVENTS OR INTERFERES WITH MANY ACTIVE NOT PASSIVE ACTIVITIES
PAIN_FUNCTIONAL_ASSESSMENT: PREVENTS OR INTERFERES SOME ACTIVE ACTIVITIES AND ADLS
PAIN_FUNCTIONAL_ASSESSMENT: PREVENTS OR INTERFERES SOME ACTIVE ACTIVITIES AND ADLS

## 2023-10-10 ASSESSMENT — PAIN DESCRIPTION - FREQUENCY
FREQUENCY: CONTINUOUS
FREQUENCY: CONTINUOUS
FREQUENCY: INTERMITTENT

## 2023-10-10 ASSESSMENT — PAIN DESCRIPTION - LOCATION
LOCATION: KNEE;SHOULDER
LOCATION: GENERALIZED
LOCATION: LEG
LOCATION: LEG

## 2023-10-10 ASSESSMENT — PAIN SCALES - WONG BAKER
WONGBAKER_NUMERICALRESPONSE: 2
WONGBAKER_NUMERICALRESPONSE: 0
WONGBAKER_NUMERICALRESPONSE: 0
WONGBAKER_NUMERICALRESPONSE: 2
WONGBAKER_NUMERICALRESPONSE: 4
WONGBAKER_NUMERICALRESPONSE: 4

## 2023-10-10 ASSESSMENT — PAIN DESCRIPTION - DESCRIPTORS
DESCRIPTORS: ACHING;CRAMPING;DISCOMFORT
DESCRIPTORS: ACHING
DESCRIPTORS: ACHING
DESCRIPTORS: ACHING;DISCOMFORT
DESCRIPTORS: ACHING

## 2023-10-10 ASSESSMENT — PAIN DESCRIPTION - PAIN TYPE
TYPE: CHRONIC PAIN
TYPE: CHRONIC PAIN;ACUTE PAIN
TYPE: CHRONIC PAIN

## 2023-10-10 NOTE — PROGRESS NOTES
A complete drug regimen review was completed for this patient.      []  No clinically significant medication issue was identified    [x]   Yes, a clinically significant medication issue was identified     []  Adverse Drug Event:      [x]  Allergy:      []  Side Effect:      []  Ineffective Therapy:      []  Drug Interaction:     []  Duplicated Therapy:     []  Untreated Indication:      []  Non-adherence:     []  Other:      Nursing/Pharmacy contacted the physician:   Date:  10/10/58237  Time: 2121     Actions recommended by physician were completed:  Date : 10/10/2023 Time: 1700    Electronically signed by Fredy Lopez RN on 10/10/23 at 6:03 PM EDT

## 2023-10-10 NOTE — PROGRESS NOTES
Ethnicity  \"Are you of , /a, or Welsh origin? \"  Check all that apply:  [x] A. No, not of , /a, or [place] Origin  [] B.  Yes, [place] American, Chicano/a  [] C.  Yes, [facility]  [] D.  Yes, [place]  [] E.  Yes, another , , or Welsh origin  [] X. Patient unable to respond    Race  \"What is your race? \"  Check all that apply:  [x] A. White  [] B. Black or   [] C. American [place] or [place] Native  [] D.  [place]  [] E. [place]  [] F. Vincentian  [] G. [place]  [] [facility]  [] I. [place]  [] J.  Other   [] K.   [] L. Italian or [place]  [] M. Cuban  [] N. Other [facility]  [] X. Patient unable to respond    High Risk Drug Classes:  Use and Indication    Is taking: Check if the pt is taking any medications by pharmacological classification, not how it is used, in the following classes  Indication noted: If column 1 is checked, check if there is an indication noted for all meds in the drug class Is taking  (check all that apply) Indication noted (check all that apply)   Antipsychotic [] []   Anticoagulant [x] []   Antibiotic [] []   Opioid [x] []   Antiplatelet [x] []   Hypoglycemic (including insulin) [] []   None of the above []     Special Treatments, Procedures, and Programs    Check all of the following treatments, procedures, and programs that apply on admission. On admission (check all that apply)   Cancer Treatments   A1. Chemotherapy []           A2. IV []           A3. Oral []           A10. Other []   B1. Radiation []   Respiratory Therapies   C1. Oxygen Therapy []           C2. Continuous []           C3. Intermittent []           C4. High-concentration []   D1. Suctioning []           D2. Scheduled []           D3. As needed []   E1. Tracheostomy Care []   F1.  Invasive Mechanical Ventilator (ventilator or respirator) []   G1. Non-invasive Mechanical Ventilator []           G2. BiPAP []

## 2023-10-10 NOTE — H&P
patient/family has a good understanding of our discharge process and will benefit from an interdisciplinary inpatient rehabilitation program. The patient has potential to make improvement and is in need of at least two of the following multidisciplinary therapies including but not limited to physical, occupational, respiratory, and speech, nutritional services, wound care, and prosthetics and orthotics. Given the patients complex condition and risk of further medical complications, rehabilitation services cannot be safely provided at a lower level of care such as a skilled nursing facility. All of the goals listed below were reviewed with the patient and he/she is in agreement. I have compared the patients medical and functional status at the time of the preadmission screening and the same on this date, and there are no significant changes. By signing this document, I acknowledge that I have personally performed a full physical examination on this patient within 24 hours of admission to this inpatient rehabilitation facility and have determined the patient to be able to tolerate the above course of treatment at an intensive level for a reasonable period of time. I will be completing a detailed individualized  Plan of Care for this patient by day four of the patients stay based upon the Preadmission Screen, this Post-Admission Evaluation, and the therapy evaluations. Barriers: decreased balance, cognition, baseline sensory deficit in distal extremities, medical comorbidities  Services Required: PT, OT, SLP  Goals: Alo for mobility, ADLs, cognition  Prognosis: Good  Anticipated Dispo: home alone  ELOS: 1-2 weeks    Rehabilitation Diagnosis:   2.22, Traumatic, closed injury      Assessment and Plan:    Impairments: decreased balance, cognition, baseline sensory deficit in distal extremities, endurance    Bilateral SDH  -Holding anti-platelet until 69/88 per Neuro  -No indication for AED per Neuro.  Patient refused EEG. -Regulate sleep/wake. Emphasis on routine. -PT/OT/SLP. H/o CIDP   -Contributing to functional impairments with  baseline weakness, sensory deficits in distal extremities, and balance deficits  -continue prednisone  -Follows with Dr. Nila Pastrana for infusions as outpatient  -PT/OT/SLP    Rhabdomyolysis  -Improved s/p IVF per Nephrology  -PT/OT    GAIL  -Nephrology following, appreciate input  -Avoid nephrotoxins, renally dose meds  -Monitor renal function    Hyponatremia  -Nephrology following, appreciate input  -Fluid restriction    HTN  -continue amlodipine, metoprolol,     HLD  -change atorvastatin to rosuvastatin due to history of joint/muscle pain per patient (takes simvastatin at home which is nonformulary). Diastolic dysfunction  -continue bb  -Daily wt    Asthma  -continue Dulera, montelukast, tiotropium, prn albuterol    GERD  -continue pantoprazole    Morbid Obesity s/p gastric bypass  -Complicating assessment and treatment. Placing patient at risk for multiple co-morbidities as well as early death and contributing to the patient's presentation.   -Dietician consult. Counseling and education. Bladder   -High risk retention   -Monitor PVRs, SC prn >300cc    Bowel   -High risk constipation   -senna+colace BID, PRN miralax, MoM, and bisacodyl supp. Safety   -fall precautions    Pain control  -continue hydromorphone prn (home med)    PPx  -DVT: Hold due to [address]  -GI: pantoprazole    FULL [facility].  To Mcnair MD 10/10/2023, 6:19 PM

## 2023-10-10 NOTE — CARE COORDINATION
CASE MANAGEMENT DISCHARGE SUMMARY: Discharge order noted. Patient discharging to [facility].      DISCHARGE DATE: 10/10/23    DISCHARGED TO [facility]    TRANSPORTATION: Nursing             TIME: Afternoon      Andrews THOMAS, RN  Case Management  [phone]             Electronically signed by Andrews Ybarra RN on 10/10/2023 at 11:00 AM

## 2023-10-10 NOTE — PROGRESS NOTES
Patient admitted to room 3262 per bed. Patient was oriented to the Call Light, Phone, TV, Thermostat, Bed Controls, Bathroom and Emergency Cord. Patient verbalized and demonstrated understanding of all. Patient was also given an over view of Unit Routines for Acute Rehab, including what to wear for therapy. The patient's role in goal setting was reviewed along with an explanation of the Interdisciplinary Team meeting, the 's role in coordinating services and the Discharge Planning/Continuum of Care process. Patient Rights and Responsibilities were reviewed. Meal times were explained, including how to order food. The white board (used for communication) was pointed out emphasizing  the 3 hours/day Therapy Schedule (posted most evenings), the number (and process) for reporting grievances, and the Doctor's, Nurse's, and PCA's names. It was recommended that any family that will be care givers or any care givers the patient has, take part in therapy. There are no set visiting hours, and it was suggested that non-caregiver friends and family visitors come after therapy (at 4 PM or later) to allow patient to rest in between sessions.

## 2023-10-10 NOTE — DISCHARGE INSTR - COC
Continuity of Care Form    Patient Name: Anthony Yap   :  1955  MRN:  [id number]    Admit date:  10/6/2023  Discharge date:  ***    Code Status Order: Full Code   Advance Directives:     Admitting Physician:   James Adams MD  PCP: Lindsey Bowers MD    Discharging Nurse: [facility] Unit/Room#: C7W-5625/2117-01  Discharging Unit Phone Number: ***    Emergency Contact:   Extended Emergency Contact Information  Primary Emergency Contact: Monet Hess  Address: [place]   [place] [address] [address] Phone: [phone]  Mobile Phone: [phone]  Relation: Other  Secondary Emergency Contact: Erin Valencia  Address: [address] [place] [address], [address] [place] [address] [place] [address] [address] Phone: [phone]  Work Phone: [phone]  Relation: Other    Past Surgical History:  Past Surgical History:   Procedure Laterality Date    FOOT SURGERY      right foot    GASTRIC BYPASS SURGERY  2019    NERVE BIOPSY      out of right ankle    TONSILLECTOMY         Immunization History:   Immunization History   Administered Date(s) Administered    COVID-19, MODERNA BLUE border, Primary or Immunocompromised, (age 12y+), IM, 100 mcg/0.5mL 2021, 2021, 2022    COVID-19, MODERNA Bivalent, (age 12y+), IM, 48 mcg/0.5 mL 2022    Influenza 2010, 2011, 10/29/2012, 2013    TDaP, ADACEL (age 6y-58y), [facility] (age 10y+), IM, 0.5mL 2013       Active Problems:  Patient Active Problem List   Diagnosis Code    Asthma J45.909    Neuropathy, peroneal nerve G57.30    HTN (hypertension), benign I10    Hypertriglyceridemia E78.1    Trigger finger, acquired M65.30    Osteopenia M85.80    Chest pain W74.2    Diastolic dysfunction, left ventricle I51.9    GERD (gastroesophageal reflux disease) K21.9    Chronic inflammatory demyelinating polyneuritis (HCC) G61.81    Subdural bleeding ([address]) I62.00       Isolation/Infection:   Isolation            No Isolation          Patient

## 2023-10-10 NOTE — PLAN OF CARE
Problem: Discharge Planning  Goal: Discharge to home or other facility with appropriate resources  10/10/2023 0923 by Belinda Daniel RN  Outcome: Progressing  Flowsheets (Taken 10/9/2023 0800 by Brody Moscoso RN)  Discharge to home or other facility with appropriate resources:   Identify barriers to discharge with patient and caregiver   Arrange for needed discharge resources and transportation as appropriate  10/9/2023 1951 by Brody Moscoso RN  Outcome: Progressing  Flowsheets (Taken 10/9/2023 0800)  Discharge to home or other facility with appropriate resources:   Identify barriers to discharge with patient and caregiver   Arrange for needed discharge resources and transportation as appropriate     Problem: Pain  Goal: Verbalizes/displays adequate comfort level or baseline comfort level  10/10/2023 0923 by Belinda Daniel RN  Outcome: Progressing  Flowsheets (Taken 10/9/2023 0800 by Brody Moscoso RN)  Verbalizes/displays adequate comfort level or baseline comfort level:   Encourage patient to monitor pain and request assistance   Assess pain using appropriate pain scale   Administer analgesics based on type and severity of pain and evaluate response   Implement non-pharmacological measures as appropriate and evaluate response   Consider cultural and social influences on pain and pain management   Notify Licensed Independent Practitioner if interventions unsuccessful or patient reports new pain  10/9/2023 1951 by Brody Moscoso RN  Outcome: Progressing  Flowsheets (Taken 10/9/2023 0800)  Verbalizes/displays adequate comfort level or baseline comfort level:   Encourage patient to monitor pain and request assistance   Assess pain using appropriate pain scale   Administer analgesics based on type and severity of pain and evaluate response   Implement non-pharmacological measures as appropriate and evaluate response   Consider cultural and social influences on pain and pain management   Notify Licensed

## 2023-10-10 NOTE — CARE COORDINATION
Received call from admissions at [facility], THE [facility]U. Precert obtained for admission. Perfect Serve message sent to Dr. Neo Larkin. Jacklyn THOMAS RN  Case Management  [phone]    Electronically signed by Jacklyn Klein RN on 10/10/2023 at 9:34 AM    Addendum:     Voicemail message left for Maci in admissions for  ARU regarding the discharge order.      Jacklyn THOMAS RN  Case Management  [phone]    Electronically signed by Jacklyn Klein RN on 10/10/2023 at 11:00 AM

## 2023-10-11 LAB
BASOPHILS # BLD: 0.1 K/UL (ref 0–0.2)
BASOPHILS NFR BLD: 0.7 %
DEPRECATED RDW RBC AUTO: 16.5 % (ref 12.4–15.4)
EOSINOPHIL # BLD: 0.3 K/UL (ref 0–0.6)
EOSINOPHIL NFR BLD: 3.5 %
HCT VFR BLD AUTO: 37.7 % (ref 36–48)
HGB BLD-MCNC: 12.7 G/DL (ref 12–16)
LYMPHOCYTES # BLD: 0.7 K/UL (ref 1–5.1)
LYMPHOCYTES NFR BLD: 8.3 %
MAGNESIUM SERPL-MCNC: 1.8 MG/DL (ref 1.8–2.4)
MCH RBC QN AUTO: 28.9 PG (ref 26–34)
MCHC RBC AUTO-ENTMCNC: 33.7 G/DL (ref 31–36)
MCV RBC AUTO: 85.7 FL (ref 80–100)
MONOCYTES # BLD: 1.1 K/UL (ref 0–1.3)
MONOCYTES NFR BLD: 13.4 %
NEUTROPHILS # BLD: 6.3 K/UL (ref 1.7–7.7)
NEUTROPHILS NFR BLD: 74.1 %
PLATELET # BLD AUTO: 447 K/UL (ref 135–450)
PMV BLD AUTO: 8.9 FL (ref 5–10.5)
PREALB SERPL-MCNC: 11.9 MG/DL (ref 20–40)
RBC # BLD AUTO: 4.4 M/UL (ref 4–5.2)
WBC # BLD AUTO: 8.4 K/UL (ref 4–11)

## 2023-10-11 PROCEDURE — 83735 ASSAY OF MAGNESIUM: CPT

## 2023-10-11 PROCEDURE — 6370000000 HC RX 637 (ALT 250 FOR IP): Performed by: PHYSICAL MEDICINE & REHABILITATION

## 2023-10-11 PROCEDURE — 97530 THERAPEUTIC ACTIVITIES: CPT

## 2023-10-11 PROCEDURE — 92523 SPEECH SOUND LANG COMPREHEN: CPT

## 2023-10-11 PROCEDURE — 97110 THERAPEUTIC EXERCISES: CPT

## 2023-10-11 PROCEDURE — 1280000000 HC REHAB R&B

## 2023-10-11 PROCEDURE — 2580000003 HC RX 258: Performed by: PHYSICAL MEDICINE & REHABILITATION

## 2023-10-11 PROCEDURE — 94640 AIRWAY INHALATION TREATMENT: CPT

## 2023-10-11 PROCEDURE — 85025 COMPLETE CBC W/AUTO DIFF WBC: CPT

## 2023-10-11 PROCEDURE — 84134 ASSAY OF PREALBUMIN: CPT

## 2023-10-11 PROCEDURE — 97116 GAIT TRAINING THERAPY: CPT

## 2023-10-11 PROCEDURE — 97166 OT EVAL MOD COMPLEX 45 MIN: CPT

## 2023-10-11 PROCEDURE — 36415 COLL VENOUS BLD VENIPUNCTURE: CPT

## 2023-10-11 PROCEDURE — 97535 SELF CARE MNGMENT TRAINING: CPT

## 2023-10-11 PROCEDURE — 94760 N-INVAS EAR/PLS OXIMETRY 1: CPT

## 2023-10-11 PROCEDURE — 97162 PT EVAL MOD COMPLEX 30 MIN: CPT

## 2023-10-11 RX ADMIN — HYDROMORPHONE HYDROCHLORIDE 4 MG: 4 TABLET ORAL at 18:50

## 2023-10-11 RX ADMIN — ROSUVASTATIN CALCIUM 10 MG: 10 TABLET, FILM COATED ORAL at 20:22

## 2023-10-11 RX ADMIN — CETIRIZINE HYDROCHLORIDE 10 MG: 10 TABLET ORAL at 07:44

## 2023-10-11 RX ADMIN — FENOFIBRATE 160 MG: 160 TABLET ORAL at 07:42

## 2023-10-11 RX ADMIN — HYDROMORPHONE HYDROCHLORIDE 4 MG: 4 TABLET ORAL at 06:13

## 2023-10-11 RX ADMIN — MOMETASONE FUROATE AND FORMOTEROL FUMARATE DIHYDRATE 2 PUFF: 200; 5 AEROSOL RESPIRATORY (INHALATION) at 20:14

## 2023-10-11 RX ADMIN — MONTELUKAST 10 MG: 10 TABLET, FILM COATED ORAL at 20:22

## 2023-10-11 RX ADMIN — PANTOPRAZOLE SODIUM 40 MG: 40 TABLET, DELAYED RELEASE ORAL at 06:12

## 2023-10-11 RX ADMIN — MOMETASONE FUROATE AND FORMOTEROL FUMARATE DIHYDRATE 2 PUFF: 200; 5 AEROSOL RESPIRATORY (INHALATION) at 08:13

## 2023-10-11 RX ADMIN — METOPROLOL TARTRATE 100 MG: 50 TABLET ORAL at 07:43

## 2023-10-11 RX ADMIN — PREDNISONE 2 MG: 1 TABLET ORAL at 07:44

## 2023-10-11 RX ADMIN — HYDROMORPHONE HYDROCHLORIDE 4 MG: 4 TABLET ORAL at 22:47

## 2023-10-11 RX ADMIN — SODIUM CHLORIDE, PRESERVATIVE FREE 10 ML: 5 INJECTION INTRAVENOUS at 20:09

## 2023-10-11 RX ADMIN — AMLODIPINE BESYLATE 5 MG: 5 TABLET ORAL at 07:44

## 2023-10-11 RX ADMIN — SENNOSIDES AND DOCUSATE SODIUM 1 TABLET: 50; 8.6 TABLET ORAL at 20:22

## 2023-10-11 RX ADMIN — HYDROMORPHONE HYDROCHLORIDE 4 MG: 4 TABLET ORAL at 10:07

## 2023-10-11 RX ADMIN — TIOTROPIUM BROMIDE INHALATION SPRAY 2 PUFF: 3.12 SPRAY, METERED RESPIRATORY (INHALATION) at 08:13

## 2023-10-11 RX ADMIN — SODIUM CHLORIDE, PRESERVATIVE FREE 10 ML: 5 INJECTION INTRAVENOUS at 08:00

## 2023-10-11 RX ADMIN — HYDROMORPHONE HYDROCHLORIDE 4 MG: 4 TABLET ORAL at 14:19

## 2023-10-11 ASSESSMENT — PAIN SCALES - GENERAL
PAINLEVEL_OUTOF10: 6
PAINLEVEL_OUTOF10: 10
PAINLEVEL_OUTOF10: 6
PAINLEVEL_OUTOF10: 1
PAINLEVEL_OUTOF10: 8
PAINLEVEL_OUTOF10: 6
PAINLEVEL_OUTOF10: 7
PAINLEVEL_OUTOF10: 7
PAINLEVEL_OUTOF10: 6
PAINLEVEL_OUTOF10: 10
PAINLEVEL_OUTOF10: 6
PAINLEVEL_OUTOF10: 0
PAINLEVEL_OUTOF10: 6
PAINLEVEL_OUTOF10: 7
PAINLEVEL_OUTOF10: 6
PAINLEVEL_OUTOF10: 0
PAINLEVEL_OUTOF10: 7
PAINLEVEL_OUTOF10: 8
PAINLEVEL_OUTOF10: 6
PAINLEVEL_OUTOF10: 2

## 2023-10-11 ASSESSMENT — PAIN DESCRIPTION - ORIENTATION
ORIENTATION: RIGHT;LEFT
ORIENTATION: LEFT
ORIENTATION: RIGHT;LEFT

## 2023-10-11 ASSESSMENT — PAIN - FUNCTIONAL ASSESSMENT
PAIN_FUNCTIONAL_ASSESSMENT: ACTIVITIES ARE NOT PREVENTED
PAIN_FUNCTIONAL_ASSESSMENT: PREVENTS OR INTERFERES WITH MANY ACTIVE NOT PASSIVE ACTIVITIES
PAIN_FUNCTIONAL_ASSESSMENT: ACTIVITIES ARE NOT PREVENTED
PAIN_FUNCTIONAL_ASSESSMENT: PREVENTS OR INTERFERES WITH MANY ACTIVE NOT PASSIVE ACTIVITIES

## 2023-10-11 ASSESSMENT — PAIN DESCRIPTION - DESCRIPTORS
DESCRIPTORS: ACHING;DISCOMFORT
DESCRIPTORS: THROBBING
DESCRIPTORS: ACHING
DESCRIPTORS: DISCOMFORT
DESCRIPTORS: DISCOMFORT
DESCRIPTORS: ACHING
DESCRIPTORS: DISCOMFORT

## 2023-10-11 ASSESSMENT — PAIN DESCRIPTION - LOCATION
LOCATION: ARM
LOCATION: LEG
LOCATION: KNEE;LEG

## 2023-10-11 ASSESSMENT — PAIN SCALES - WONG BAKER
WONGBAKER_NUMERICALRESPONSE: 2
WONGBAKER_NUMERICALRESPONSE: 0

## 2023-10-11 NOTE — PLAN OF CARE
Problem: Discharge Planning  Goal: Discharge to home or other facility with appropriate resources  10/11/2023 1312 by Nick Guzman RN  Outcome: Progressing  Flowsheets (Taken 10/11/2023 1312)  Discharge to home or other facility with appropriate resources:   Identify barriers to discharge with patient and caregiver   Identify discharge learning needs (meds, wound care, etc)  10/11/2023 0124 by Chema Morales RN  Outcome: Progressing     Problem: Safety - Adult  Goal: Free from fall injury  10/11/2023 1312 by Nick Guzman RN  Outcome: Progressing  Flowsheets (Taken 10/11/2023 1312)  Free From Fall Injury: Instruct family/caregiver on patient safety  10/11/2023 0124 by Chema Morales RN  Outcome: Progressing     Problem: Pain  Goal: Verbalizes/displays adequate comfort level or baseline comfort level  10/11/2023 1312 by Nick Guzman RN  Outcome: Progressing  Flowsheets (Taken 10/11/2023 1312)  Verbalizes/displays adequate comfort level or baseline comfort level:   Encourage patient to monitor pain and request assistance   Assess pain using appropriate pain scale   Administer analgesics based on type and severity of pain and evaluate response   Implement non-pharmacological measures as appropriate and evaluate response  10/11/2023 0124 by Chema Morales RN  Outcome: Progressing     Problem: Skin/Tissue Integrity  Goal: Absence of new skin breakdown  Description: 1. Monitor for areas of redness and/or skin breakdown  2. Assess vascular access sites hourly  3. Every 4-6 hours minimum:  Change oxygen saturation probe site  4. Every 4-6 hours:  If on nasal continuous positive airway pressure, respiratory therapy assess nares and determine need for appliance change or resting period.   10/11/2023 1312 by Nick Guzman RN  Outcome: Progressing  10/11/2023 0124 by Chema Morales RN  Outcome: Progressing     Problem: ABCDS Injury Assessment  Goal: Absence of physical injury  10/11/2023 1312 by Lori Duke Progressing  Flowsheets (Taken 10/11/2023 1312)  Oral Mucous Membranes Remain Intact: Assess oral mucosa and hygiene practices     Problem: Musculoskeletal - Adult  Goal: Return mobility to safest level of function  Outcome: Progressing  Flowsheets (Taken 10/11/2023 1312)  Return Mobility to Safest Level of Function:   Assess patient stability and activity tolerance for standing, transferring and ambulating with or without assistive devices   Assist with transfers and ambulation using safe patient handling equipment as needed   Ensure adequate protection for wounds/incisions during mobilization   Obtain physical therapy/occupational therapy consults as needed   Instruct patient/family in ordered activity level  Goal: Return ADL status to a safe level of function  Outcome: Progressing  Flowsheets (Taken 10/11/2023 1312)  Return ADL Status to a Safe Level of Function:   Administer medication as ordered   Assess activities of daily living deficits and provide assistive devices as needed   Assist and instruct patient to increase activity and self care as tolerated     Problem: Gastrointestinal - Adult  Goal: Maintains or returns to baseline bowel function  Outcome: Progressing  Flowsheets (Taken 10/11/2023 1312)  Maintains or returns to baseline bowel function:   Assess bowel function   Encourage mobilization and activity   Encourage oral fluids to ensure adequate hydration  Goal: Maintains adequate nutritional intake  Outcome: Progressing  Flowsheets (Taken 10/11/2023 1312)  Maintains adequate nutritional intake: Monitor percentage of each meal consumed     Problem: Infection - Adult  Goal: Absence of infection during hospitalization  Outcome: Progressing  Flowsheets (Taken 10/11/2023 1312)  Absence of infection during hospitalization:   Assess and monitor for signs and symptoms of infection   Monitor lab/diagnostic results     Problem: Metabolic/Fluid and Electrolytes - Adult  Goal: Electrolytes maintained within

## 2023-10-11 NOTE — PLAN OF CARE
presents to ARU with impaired mobility and self-care below her baseline. She requires comprehensive inpatient rehab program in order to return to community setting. I have reviewed this initial plan of care and agree with its contents:    Title   Name    Date    Time    Physician: Drew Parker.  Delilah Cleveland MD 10/12/2023, 10:31 PM    Case [facility]     Case Management   [phone]    10/12/2023  7:50 AM    OT: Electronically signed by Tara Madrid OT on 10/12/23 at 7:52 AM EDT    PT:Electronically signed by Faith Case SPT on 10/11/23 at 4:04 PM EDT/Electronically signed by Homero Cui PT on 10/11/23 at 4:16 PM EDT      ST: Electronically signed by Isaura Gamble MS, CCC-SLP #[id number] on 10/12/2023 at 10:12AM EDT    [address] 10/12/2023    Other:

## 2023-10-11 NOTE — PROGRESS NOTES
Pt requesting regular mattress in room. Pt educated about the benefits of the SARA mattress and why she has the SARA mattress. Pt verbalized understanding.

## 2023-10-11 NOTE — CONSULTS
Nutrition Assessment     Type and Reason for Visit: Consult    Nutrition Recommendations/Plan:   Continue regular diet     Malnutrition Assessment:  Malnutrition Status: At risk for malnutrition (Comment)    Nutrition Assessment:  RD consult for IP rehab. Pt. admitted for subdural hematoma. PMH includes HTN, hypertriglyceridemia, GERD, osteopenia, and trigger finger. Pt. presented to the ED with c/o a fall, Pt. notes she was on the ground for two days waiting for help. Currently receiving a regular diet. Meal intake appear adequate. No new RD recommendations at this time. Estimated Daily Nutrient Needs:  Energy (kcal):  1600-2000kcal (20-25kcal/kg) Weight Used for Energy Requirements: Current     Protein (g):  80-96g (1-1.2g/kg) Weight Used for Protein Requirements: Current        Fluid (ml/day):    Method Used for Fluid Requirements: 1 ml/kcal    Nutrition Related Findings:   Na 131. LBM 10/10 Wound Type: None    Current Nutrition Therapies:    ADULT DIET; Regular    Anthropometric Measures:  Height: 5' 3\" (160 cm)  Current Body Wt: 176 lb 9.4 oz (80.1 kg)   BMI: 31.3    Nutrition Diagnosis:   Increased nutrient needs related to increase demand for energy/nutrients as evidenced by other (comment) (IP rehab)    Nutrition Interventions:   Food and/or Nutrient Delivery: Continue Current Diet  Nutrition Education/Counseling:  (Monitor need)  Coordination of Nutrition Care: Continue to monitor while inpatient       Goals:  Previous Goal Met: Progressing toward Goal(s)  Goals: Meet at least 75% of estimated needs       Nutrition Monitoring and Evaluation:   Behavioral-Environmental Outcomes: None Identified  Food/Nutrient Intake Outcomes: Food and Nutrient Intake  Physical Signs/Symptoms Outcomes: Biochemical Data, Weight    Discharge Planning:     Too soon to determine     Estrella López RD  Contact: [phone]

## 2023-10-11 NOTE — FLOWSHEET NOTE
Patient admitted to ARU on 10/3 with Dx of subdural hematoma after mechanical fall at home where she lives home alone. Stated she has friends and family who lives nearby. She stated that she fell near her Lazy boy recliner and hit the side of the coffee table on her way down. She stated she was down for two days due to weakness. MRI at hospital revealed SDH. Patient is now here at rehab for PT/OT due to debility. A/O x 4. Swallows pills whole with thin liquids. She is on regular selective diet. She is not diabetic. Not on DVT prophylaxis d/t risk for bleeding. She is pleasant but slightly anxious. She is continent of bowel and bladder. LBM is at hs 10/10. Soft and medium. Requested to have a bigger and wider raised toilet seat. Skin assessment done. Musa orders initiated due to fragile skin. Reddened sacrum and coccyx that's blanchable, noted healing old open area on her right inner buttock. On SARA air mattress for which she stated is uncomfortable. Was assisted to the BR during this shift using a stedy x1 as she is still too weak to stand up on her own and needed a boost. Had prn pain medication at hs. Denies she needs melatonin to help her sleep. Call light within reach. Bed alarm on.

## 2023-10-11 NOTE — PROGRESS NOTES
Facility/Department: [facility]  SLP Speech Language Cognitive Assessment     Patient: Geetha Busby   : 1955   MRN: [id number]      Evaluation Date: 10/11/2023      Admitting Dx: Subdural hematoma ([address]) [S06. 5XAA]  Has a past medical history of Asthma, Chronic inflammatory demyelinating polyneuritis ([address]), Diastolic dysfunction, GERD (gastroesophageal reflux disease), Hypertension, Hypertriglyceridemia, Neuropathy, peroneal nerve, Obesity, Polyneuropathy, Sacral insufficiency fracture, and Spondylolisthesis, grade 1. Has a past surgical history that includes Nerve Biopsy; Tonsillectomy; Foot surgery; and Gastric bypass surgery (2019). Allergies: Gadolinium derivatives, Immune globulin, Bactrim [sulfamethoxazole-trimethoprim], Cellcept [mycophenolate mofetil], Cvs cortisone intense healing [hydrocortisone], Diltiazem, Hydralazine, Imuran [azathioprine], Iodine, Lipitor [atorvastatin], and Morphine   Date Onset: 10/9/2023  Pain: Denies            Date of Evaluation: 10/11/2023  Evaluating Therapist: Juaquin Robles                           Chart Review  History of Present Illness/Hospital Course:  Patient is a 71 yo F with pmh CIDP, diastolic dysfunction, HTN, HLD, Asthma, GERD, obesity s/p gastric bypass who initially presented 10/6/2023 after being found down at home. Patient unable to provide details about circumstances of fall but states it was mechanical and she was down for 2 days prior to being found. She was unable to get up due to leg weakness. She denies loss of consciousness. She was found to have 3 mm thick bilateral supratentorial hemispheric subdural hematomas. There was initial concern for stroke but MRI was negative for acute infarct. Neurology is recommending holding Plavix until 10/18. Course complicated by rhabdomyolysis, GAIL, hyponatremia. Now presents to ARU with impaired mobility and self-care below her baseline.  Currently, patient reports generalized weakness and limits     COGNITION  []Unable to be assessed secondary to Aphasia     Overall Orientation : Within functional limits  []Unable to be assessed secondary to Aphasia   Oriented x4    Attention: Mild    Impaired Alternating Attention  Impaired Divided Attention    Memory: Mild    Impaired Immediate/working Memory    Problem Solving: Mild    Able to complete simple functional tasks  Impaired Complex Tasks   Impaired executive functioning  Impaired calculations    Safety/Judgement: Mild    Unable to self-monitor and self correct consistently   Impulsive   Impaired judgement for novel situations      Prognosis:  Speech Therapy Prognosis  Prognosis: Good  Prognosis Considerations: Medical Diagnosis;Previous Level of Function     Education:  Patient Education Response: Verbalizes understanding  Safety Devices in place: Yes  Type of devices:  All fall risk precautions in place     Therapy Time:    Individual   Time In 1330   Time Out 1415   Minutes 45      Electronically signed by  Toñito Weston MS, CCC-SLP #[id number]  Speech Language Pathologist   on 10/11/2023 at 2:43 PM

## 2023-10-11 NOTE — FLOWSHEET NOTE
4 Eyes Skin Assessment     NAME:  [place] OF BIRTH:  1955  MEDICAL RECORD NUMBER:  [id number]    The patient is being assessed for  Admission    I agree that at least one RN has performed a thorough Head to Toe Skin Assessment on the patient. ALL assessment sites listed below have been assessed. Areas assessed by both nurses: Kassi Hartmann RN and Cortes Linares RN    Head, Face, Ears, Shoulders, Back, Chest, Arms, Elbows, Hands, Sacrum. Buttock, Coccyx, Ischium, Legs. Feet and Heels, Under Medical Devices , and Other   Two areas of abrasions on Left elbow. Old resolving area on right lyndon rectal area. Sacrum and coccyx red but blanchable. Purplish bruise above left eyebrow. Tiny reddened spot on the center of the chest and also on the abdominal area and bruisings scattered on the BUE. Mepilex dressings on Left elbow area. Patient prefers not to have any mepilex border on sacrum at this time. Currently laying on SARA mattress for protection. Does the Patient have a Wound? Yes wound(s) were present on assessment.  LDA wound assessment was Initiated and completed by RN       Musa Prevention initiated by RN: Yes  Wound Care Orders initiated by RN: Yes    Pressure Injury (Stage 3,4, Unstageable, DTI, NWPT, and Complex wounds) if present, place Wound referral order by RN under : No    New Ostomies, if present place, Ostomy referral order under : No     Nurse 1 eSignature: Electronically signed by Kassi Hartmann RN on 10/10/23 at 11:12 PM EDT    **SHARE this note so that the co-signing nurse can place an eSignature**    Nurse 2 eSignature: Electronically signed by Cortes Linares RN on 10/11/23 at 1:02 AM EDT

## 2023-10-11 NOTE — PROGRESS NOTES
Occupational Therapy  Facility/Department: [facility]  Rehabilitation Occupational Therapy Evaluation       Date: 10/11/23  Patient Name: Cristobal Rodriguez       Room: N6Z-7262/8754-13  MRN: [id number]  Account: [de-identified]   : 1955  (74 y.o.) Gender: female     Referring Practitioner: Dr. Ellison Ear  Diagnosis: s/p fall, Bilateral SDH  Additional Pertinent Hx: \"Patient is a 69 yo F with pmh CIDP, diastolic dysfunction, HTN, HLD, Asthma, GERD, obesity s/p gastric bypass who initially presented 10/6/2023 after being found down at home. Patient unable to provide details about circumstances of fall but states it was mechanical and she was down for 2 days prior to being found. She was unable to get up due to leg weakness. She denies loss of consciousness. She was found to have 3 mm thick bilateral supratentorial hemispheric subdural hematomas. There was initial concern for stroke but MRI was negative for acute infarct. Neurology is recommending holding Plavix until 10/18. Course complicated by rhabdomyolysis, GAIL, hyponatremia. Now presents to ARU with impaired mobility and self-care below her baseline. Currently, patient reports generalized weakness and difficulty with balance. She has chronic numbness/tingling in distal extremities (up to elbows and knees). Also reports joint pain since hospitalized which she attributes to being on atorvastatin. She denies headache, vision changes, difficulty with speech, swallow, or memory. \" Copied per Dr. Ran Knowles note. Restrictions  Restrictions/Precautions: Fall Risk  Other position/activity restrictions: H/O GBS (per pt report). RUE IV  Equipment Used:  (RW)    Subjective  Subjective: Pt met in room, pt was lying in bed, and she was agreeable to OT eval and ADL tx session for shower. Pt denied pain at start of session, but pt did state her pain was a 10/10 at end of session when RN came in to administer pain medication, but she did not c/o pain during session. 10/18. Course complicated by rhabdomyolysis, GAIL, hyponatremia. Now presents to ARU with impaired mobility and self-care below her baseline. Currently, patient reports generalized weakness and difficulty with balance. She has chronic numbness/tingling in distal extremities (up to elbows and knees). Also reports joint pain since hospitalized which she attributes to being on atorvastatin. She denies headache, vision changes, difficulty with speech, swallow, or memory. \" Copied per Dr. Ramón Blue note. Exam: ADL/IADL function, functional mob/transfers, balance, activity tolerance  Assistance / Modification: Pt requires CGA for transfers and functional mobility, grooming/oral hygiene standing in RW at sink, UB bathing, and toileting. Pt requires min A for LB bathing, and max A for LB dressing. Discharge Recommendations: Home with Home health OT; Home with assist PRN;S Level 1  Occupational Therapy Plan  Times Per Day: Twice a day  Days Per Week: 5 Days  Hours Per Day: 1.5 hours  Therapy Duration:  (7-10 days)  Current Treatment Recommendations: Strengthening;Balance training;Functional mobility training; Endurance training; Safety education & training;Self-Care / ADL; Patient/Caregiver education & training       Therapy Time   Individual Concurrent Group Co-treatment   Time In 0830         Time Out 1000         Minutes 90         Timed Code Treatment Minutes: 75 Minutes (75 ADL + 15 for Eval)       Charlotte, S/OT      Therapist was present, directed patients care, made skilled judgement, and was responsible for assessment and treatment of the patient.       [facility], OTR/L #[id number]

## 2023-10-11 NOTE — PROGRESS NOTES
Patient admitted to rehab with subdural hematoma. A/Ox4. Transfers with walker x1. Mobility restrictions: WBAT. On regular diet, tolerating well. Medications taken whole with thins. On none for DVT prophylaxis. Skin: scattered abrasions L elbow, L eye, buttocks. Oxygen: RA. LDA: PIV RAC. Has been continent of bowel and ocntinent of bladder. LBM 10/10. Chair/bed alarms in use and call light in reach. Will monitor for safety.

## 2023-10-11 NOTE — PROGRESS NOTES
Physical Therapy  Facility/Department: [facility] REHAB  Rehabilitation Physical Therapy Initial Assessment    NAME: Debbie Catalan  : [birth year] (74 y.o.)  MRN: [id number]  CODE STATUS: Full Code    Date of Service: 10/11/23      Past Medical History:   Diagnosis Date    Asthma     Chronic inflammatory demyelinating polyneuritis ([address]) 2/10/4944    Diastolic dysfunction     due to polyneuropathy    GERD (gastroesophageal reflux disease) 2014    Hypertension     Hypertriglyceridemia 2010    Neuropathy, peroneal nerve 3/15/2010    Obesity     Polyneuropathy     Sacral insufficiency fracture 2017    Spondylolisthesis, grade 1 2017    Grade 1 isthmus spondylolithesis with high grade foraminal stenosis at L5     Past Surgical History:   Procedure Laterality Date    FOOT SURGERY      right foot    GASTRIC BYPASS SURGERY  2019    NERVE BIOPSY      out of right ankle    TONSILLECTOMY         Chart Reviewed: Yes  Additional Pertinent Hx: Patient is a 71 yo F with pmh CIDP, diastolic dysfunction, HTN, HLD, Asthma, GERD, obesity s/p gastric bypass who initially presented 10/6/2023 after being found down at home. Patient unable to provide details about circumstances of fall but states it was mechanical and she was down for 2 days prior to being found. She was unable to get up due to leg weakness. She denies loss of consciousness. She was found to have 3 mm thick bilateral supratentorial hemispheric subdural hematomas. There was initial concern for stroke but MRI was negative for acute infarct. Neurology is recommending holding Plavix until 10/18. Course complicated by rhabdomyolysis, GAIL, hyponatremia. Now presents to ARU with impaired mobility and self-care below her baseline. Currently, patient reports generalized weakness and difficulty with balance. She has chronic numbness/tingling in distal extremities (up to elbows and knees).  Also reports joint pain since hospitalized which she attributes to being

## 2023-10-11 NOTE — CARE COORDINATION
Chart Reviewed. Met with patient to introduce  role, initiate discussion regarding DC planning and to inform of weekly Team Conference on Tuesdays. SOCIAL WORK ASSESSMENT      GOAL:   To return home, alone but with support from family and friends, Denies desire for home care services. HOME SITUATION:   Pt lives alone, house with level entry and elevator to various floors. She has no pets. She hires out for lawn care. She reports she has been taking care of herself this long and plans to continue. She denies desire for home care and/or COA services at this time. She uses a wh walker at all times. She cooks her own meals and does her own laundry needs. Pcp:   Dr Zaheer Tafoya:  Kaitlyn Diego in [address] [place] [address] Avenue:       PERSONAL CARE:    totally independent with use of wh walker                                                                       DRIVES:  yes                                                                     FINANCES:  totally independent                                                                   MEALS:   cooks                                GROCERY SHOPS:  independent      DME CURRENTLY AT HOME:  wh walker  shower chair, [facility],[address] [place], bars in shower and walk in tub. CURRENT HOME CARE/SERVICES:  None. Informed her of possible recommendation for Home Care vs Out Patient services. She does not want home care       PREFERRED HOME CARE:  To be determined. TEAM CONFERENCE DAY:  Tuesdays. Informed her of weekly Team Conference on Tuesdays where Team will review barriers, progress, DME and DC date. This worker will update her weekly and plan for DC needs. LSW informed patient of preferred  time on date of discharge which is between 10 - 12 noon. LSW informed patient of recommendation for PCP visit within 7 days post discharge.     Transportation:   She denied having missed appts or needs

## 2023-10-12 PROCEDURE — 97110 THERAPEUTIC EXERCISES: CPT | Performed by: PHYSICAL THERAPIST

## 2023-10-12 PROCEDURE — 97116 GAIT TRAINING THERAPY: CPT | Performed by: PHYSICAL THERAPIST

## 2023-10-12 PROCEDURE — 97130 THER IVNTJ EA ADDL 15 MIN: CPT

## 2023-10-12 PROCEDURE — 36415 COLL VENOUS BLD VENIPUNCTURE: CPT

## 2023-10-12 PROCEDURE — 97129 THER IVNTJ 1ST 15 MIN: CPT

## 2023-10-12 PROCEDURE — 1280000000 HC REHAB R&B

## 2023-10-12 PROCEDURE — 97535 SELF CARE MNGMENT TRAINING: CPT

## 2023-10-12 PROCEDURE — 2580000003 HC RX 258: Performed by: PHYSICAL MEDICINE & REHABILITATION

## 2023-10-12 PROCEDURE — 6370000000 HC RX 637 (ALT 250 FOR IP): Performed by: PHYSICAL MEDICINE & REHABILITATION

## 2023-10-12 PROCEDURE — 97530 THERAPEUTIC ACTIVITIES: CPT | Performed by: PHYSICAL THERAPIST

## 2023-10-12 PROCEDURE — 97110 THERAPEUTIC EXERCISES: CPT

## 2023-10-12 PROCEDURE — 94760 N-INVAS EAR/PLS OXIMETRY 1: CPT

## 2023-10-12 PROCEDURE — 80048 BASIC METABOLIC PNL TOTAL CA: CPT

## 2023-10-12 PROCEDURE — 97530 THERAPEUTIC ACTIVITIES: CPT

## 2023-10-12 PROCEDURE — 94640 AIRWAY INHALATION TREATMENT: CPT

## 2023-10-12 RX ORDER — FERROUS SULFATE TAB EC 324 MG (65 MG FE EQUIVALENT) 324 (65 FE) MG
324 TABLET DELAYED RESPONSE ORAL
Status: DISCONTINUED | OUTPATIENT
Start: 2023-10-13 | End: 2023-10-17 | Stop reason: HOSPADM

## 2023-10-12 RX ORDER — CALCIUM CARBONATE 500 MG/1
1000 TABLET, CHEWABLE ORAL DAILY
Status: DISCONTINUED | OUTPATIENT
Start: 2023-10-12 | End: 2023-10-12

## 2023-10-12 RX ORDER — CALCIUM CARBONATE 500(1250)
500 TABLET ORAL 2 TIMES DAILY
Status: DISCONTINUED | OUTPATIENT
Start: 2023-10-12 | End: 2023-10-17 | Stop reason: HOSPADM

## 2023-10-12 RX ADMIN — CALCIUM 500 MG: 500 TABLET ORAL at 19:50

## 2023-10-12 RX ADMIN — AMLODIPINE BESYLATE 5 MG: 5 TABLET ORAL at 08:18

## 2023-10-12 RX ADMIN — METOPROLOL TARTRATE 100 MG: 50 TABLET ORAL at 08:18

## 2023-10-12 RX ADMIN — HYDROMORPHONE HYDROCHLORIDE 4 MG: 4 TABLET ORAL at 23:52

## 2023-10-12 RX ADMIN — MOMETASONE FUROATE AND FORMOTEROL FUMARATE DIHYDRATE 2 PUFF: 200; 5 AEROSOL RESPIRATORY (INHALATION) at 07:33

## 2023-10-12 RX ADMIN — HYDROMORPHONE HYDROCHLORIDE 4 MG: 4 TABLET ORAL at 09:09

## 2023-10-12 RX ADMIN — HYDROMORPHONE HYDROCHLORIDE 4 MG: 4 TABLET ORAL at 05:04

## 2023-10-12 RX ADMIN — MOMETASONE FUROATE AND FORMOTEROL FUMARATE DIHYDRATE 2 PUFF: 200; 5 AEROSOL RESPIRATORY (INHALATION) at 19:37

## 2023-10-12 RX ADMIN — CETIRIZINE HYDROCHLORIDE 10 MG: 10 TABLET ORAL at 08:18

## 2023-10-12 RX ADMIN — CALCIUM 500 MG: 500 TABLET ORAL at 11:55

## 2023-10-12 RX ADMIN — SENNOSIDES AND DOCUSATE SODIUM 1 TABLET: 50; 8.6 TABLET ORAL at 19:50

## 2023-10-12 RX ADMIN — FENOFIBRATE 160 MG: 160 TABLET ORAL at 08:18

## 2023-10-12 RX ADMIN — HYDROMORPHONE HYDROCHLORIDE 4 MG: 4 TABLET ORAL at 15:48

## 2023-10-12 RX ADMIN — TIOTROPIUM BROMIDE INHALATION SPRAY 2 PUFF: 3.12 SPRAY, METERED RESPIRATORY (INHALATION) at 07:33

## 2023-10-12 RX ADMIN — ACETAMINOPHEN 650 MG: 325 TABLET ORAL at 05:06

## 2023-10-12 RX ADMIN — HYDROMORPHONE HYDROCHLORIDE 4 MG: 4 TABLET ORAL at 19:50

## 2023-10-12 RX ADMIN — MONTELUKAST 10 MG: 10 TABLET, FILM COATED ORAL at 19:51

## 2023-10-12 RX ADMIN — SENNOSIDES AND DOCUSATE SODIUM 1 TABLET: 50; 8.6 TABLET ORAL at 08:18

## 2023-10-12 RX ADMIN — SODIUM CHLORIDE, PRESERVATIVE FREE 10 ML: 5 INJECTION INTRAVENOUS at 19:49

## 2023-10-12 RX ADMIN — PREDNISONE 2 MG: 1 TABLET ORAL at 08:17

## 2023-10-12 RX ADMIN — SODIUM CHLORIDE, PRESERVATIVE FREE 10 ML: 5 INJECTION INTRAVENOUS at 08:19

## 2023-10-12 RX ADMIN — ROSUVASTATIN CALCIUM 10 MG: 10 TABLET, FILM COATED ORAL at 19:50

## 2023-10-12 RX ADMIN — PANTOPRAZOLE SODIUM 40 MG: 40 TABLET, DELAYED RELEASE ORAL at 05:04

## 2023-10-12 ASSESSMENT — PAIN DESCRIPTION - FREQUENCY
FREQUENCY: CONTINUOUS

## 2023-10-12 ASSESSMENT — PAIN DESCRIPTION - DESCRIPTORS
DESCRIPTORS: ACHING;SHARP
DESCRIPTORS: ACHING
DESCRIPTORS: ACHING
DESCRIPTORS: ACHING;SHARP
DESCRIPTORS: ACHING

## 2023-10-12 ASSESSMENT — PAIN SCALES - GENERAL
PAINLEVEL_OUTOF10: 10
PAINLEVEL_OUTOF10: 7
PAINLEVEL_OUTOF10: 8
PAINLEVEL_OUTOF10: 10

## 2023-10-12 ASSESSMENT — PAIN DESCRIPTION - LOCATION
LOCATION: LEG;ARM
LOCATION: GENERALIZED
LOCATION: OTHER (COMMENT)
LOCATION: GENERALIZED
LOCATION: LEG;SHOULDER

## 2023-10-12 ASSESSMENT — PAIN - FUNCTIONAL ASSESSMENT
PAIN_FUNCTIONAL_ASSESSMENT: ACTIVITIES ARE NOT PREVENTED
PAIN_FUNCTIONAL_ASSESSMENT: ACTIVITIES ARE NOT PREVENTED
PAIN_FUNCTIONAL_ASSESSMENT: PREVENTS OR INTERFERES SOME ACTIVE ACTIVITIES AND ADLS
PAIN_FUNCTIONAL_ASSESSMENT: PREVENTS OR INTERFERES SOME ACTIVE ACTIVITIES AND ADLS
PAIN_FUNCTIONAL_ASSESSMENT: PREVENTS OR INTERFERES WITH MANY ACTIVE NOT PASSIVE ACTIVITIES

## 2023-10-12 ASSESSMENT — PAIN SCALES - WONG BAKER
WONGBAKER_NUMERICALRESPONSE: 0

## 2023-10-12 ASSESSMENT — PAIN DESCRIPTION - PAIN TYPE
TYPE: CHRONIC PAIN;ACUTE PAIN
TYPE: ACUTE PAIN;CHRONIC PAIN
TYPE: CHRONIC PAIN
TYPE: CHRONIC PAIN

## 2023-10-12 ASSESSMENT — PAIN DESCRIPTION - ORIENTATION
ORIENTATION: RIGHT;LEFT
ORIENTATION: LEFT;RIGHT

## 2023-10-12 ASSESSMENT — PAIN DESCRIPTION - ONSET
ONSET: ON-GOING

## 2023-10-12 NOTE — PROGRESS NOTES
Patient admitted to rehab with subdural hematoma. A/Ox4. Transfers with one assist with gait belt and front wheeled walker for ambulation. Mobility restrictions: WBAT. On regular 2000cc FR diet, tolerating fair, noncompliant with fluid restriction. Medications taken whole with fluids. Medications being adjusted per her request.   Skin: scattered bruising and abrasion to left elbow. Oxygen: room air. LDA: PIV RAC. Has been continent of bowel and  of bladder. LBM 10/11. Chair/bed alarms in use and call light in reach. Will monitor for safety. Primary Defect Length In Cm (Final Defect Size - Required For Flaps/Grafts): 1.5

## 2023-10-12 NOTE — PROGRESS NOTES
ACUTE REHAB UNIT  SPEECH/LANGUAGE PATHOLOGY      [x] Daily  [] Weekly Care Conference Note  [] Discharge    Patient:Malika Hewitt      JQF:[id number]  [facility]:[id number]  Rehab Dx/Hx: Subdural hematoma ([address]) [S06. 5XAA]    Precautions: falls  Home situation: lives alone, independent with IADLs  ST Dx: [] Aphasia  [] Dysarthria  [] Apraxia   [] Oropharyngeal dysphagia [x] Cognitive   Impairment  [] Other:   Initial Speech Therapy Assessment Diagnosis:   Speech Therapy Diagnosis  Cognitive Diagnosis: Functional temporal and spatial orientation, concrete math language, and concrete VPS/reasoning. Pt with mild deficits in higher level attention, complex PS/reasoning, complex math language/numeric reasoning; organization, and working memory. These deficits could impact functional IND in executive function warranted for adv DL tasks; pt lives alone and reports she will return home alone and remain independent. Will continue therapy and ongoing assessment  Aphasia Diagnosis: Functional complex auditory comprehension and verbal expression for tasks of graded complexity. Speech Diagnosis: Audible intelligible connected speech. Communication Diagnosis: Functional concrete language skills for auditory/visual language processing and verbal expressive language skills     Date of Admit: 10/10/2023  Room #: U9S-4017/3262-01  Date: 10/12/2023       Current functional status (updated daily):         Current Diet Order:ADULT DIET;  Regular; 2000 ml   Communication: [x]WFL  [] Aphasia  [] Dysarthria  [] Apraxia  [] Pragmatic Impairment [] Non-verbal  [] Hearing Loss  [] Other:   Cognition: [] WFL  [x] Mild  [] Moderate  [] Severe [] Unable to Assess  [] Other:  Memory: [x] WFL  [x] Mild  [] Moderate  [] Severe [] Unable to Assess  [] Other:  Behavior: [x] Alert  [x] Cooperative  []  Pleasant  [] Confused  [] Agitated  [] Uncooperative  [] Distractible [] Motivated  [] Self-Limiting [] Anxious  [] Other:  Endurance:  [x] Adequate for

## 2023-10-12 NOTE — PROGRESS NOTES
Physical Therapy  Facility/Department: [facility]  REHAB  Rehabilitation Physical Therapy Treatment Note    NAME: Gisselle Hatch  : 1955 (69 y.o.)  MRN: [id number]  CODE STATUS: Full Code    Date of Service: 10/12/23       Restrictions:  Restrictions/Precautions: Fall Risk  Position Activity Restriction  Other position/activity restrictions: H/O GBS (per pt report). RUE IV     Pertinent medical information:  Additional Pertinent Hx: Patient is a 71 yo F with pmh CIDP, diastolic dysfunction, HTN, HLD, Asthma, GERD, obesity s/p gastric bypass who initially presented 10/6/2023 after being found down at home. Patient unable to provide details about circumstances of fall but states it was mechanical and she was down for 2 days prior to being found. She was unable to get up due to leg weakness. She denies loss of consciousness. She was found to have 3 mm thick bilateral supratentorial hemispheric subdural hematomas. There was initial concern for stroke but MRI was negative for acute infarct. Neurology is recommending holding Plavix until 10/18. Course complicated by rhabdomyolysis, GAIL, hyponatremia. Now presents to ARU with impaired mobility and self-care below her baseline. Currently, patient reports generalized weakness and difficulty with balance. She has chronic numbness/tingling in distal extremities (up to elbows and knees). Also reports joint pain since hospitalized which she attributes to being on atorvastatin. She denies headache, vision changes, difficulty with speech, swallow, or memory. SUBJECTIVE  Subjective  Subjective: Patient seated on commode at start of session with RN present. Patient stated she is frustrated that she has not received her normal vitamins. RN reassured her that [facility] was notified. Pain: States pain is really bad this morning but did not report numerical value.         Social/Functional History  Lives With: Alone  Type of Home: House  Home Layout: Two level (Elevator

## 2023-10-12 NOTE — PLAN OF CARE
Problem: Discharge Planning  Goal: Discharge to home or other facility with appropriate resources  10/12/2023 0012 by Chema Morales RN  Outcome: Progressing  10/11/2023 1312 by Nick Guzman RN  Outcome: Progressing  Flowsheets (Taken 10/11/2023 1312)  Discharge to home or other facility with appropriate resources:   Identify barriers to discharge with patient and caregiver   Identify discharge learning needs (meds, wound care, etc)     Problem: Safety - Adult  Goal: Free from fall injury  10/12/2023 0012 by Chema Morales RN  Outcome: Progressing  10/11/2023 1312 by Nick Guzman RN  Outcome: Progressing  Flowsheets (Taken 10/11/2023 1312)  Free From Fall Injury: Instruct family/caregiver on patient safety     Problem: Pain  Goal: Verbalizes/displays adequate comfort level or baseline comfort level  10/12/2023 0012 by Chema Morales RN  Outcome: Progressing  10/11/2023 1312 by Nick Guzman RN  Outcome: Progressing  Flowsheets (Taken 10/11/2023 1312)  Verbalizes/displays adequate comfort level or baseline comfort level:   Encourage patient to monitor pain and request assistance   Assess pain using appropriate pain scale   Administer analgesics based on type and severity of pain and evaluate response   Implement non-pharmacological measures as appropriate and evaluate response     Problem: Skin/Tissue Integrity  Goal: Absence of new skin breakdown  Description: 1. Monitor for areas of redness and/or skin breakdown  2. Assess vascular access sites hourly  3. Every 4-6 hours minimum:  Change oxygen saturation probe site  4. Every 4-6 hours:  If on nasal continuous positive airway pressure, respiratory therapy assess nares and determine need for appliance change or resting period.   10/12/2023 0012 by Chema Morales RN  Outcome: Progressing  10/11/2023 1312 by Nick Guzman RN  Outcome: Progressing     Problem: ABCDS Injury Assessment  Goal: Absence of physical injury  10/12/2023 0012 by Trinity Cheney pressure and heart rate  Goal: Absence of cardiac dysrhythmias or at baseline  10/12/2023 0012 by Addis Womack RN  Outcome: Progressing  10/11/2023 1312 by Violetta Charles RN  Outcome: Progressing  Flowsheets (Taken 10/11/2023 1312)  Absence of cardiac dysrhythmias or at baseline: Monitor cardiac rate and rhythm     Problem: Skin/Tissue Integrity - Adult  Goal: Skin integrity remains intact  10/12/2023 0012 by Addis Womack RN  Outcome: Progressing  10/11/2023 1312 by Violetta Charles RN  Outcome: Progressing  Flowsheets (Taken 10/11/2023 1312)  Skin Integrity Remains Intact:   Monitor for areas of redness and/or skin breakdown   Assess vascular access sites hourly  Goal: Oral mucous membranes remain intact  10/12/2023 0012 by Addis Womack RN  Outcome: Progressing  10/11/2023 1312 by Violetta Charles RN  Outcome: Progressing  Flowsheets (Taken 10/11/2023 1312)  Oral Mucous Membranes Remain Intact: Assess oral mucosa and hygiene practices     Problem: Musculoskeletal - Adult  Goal: Return mobility to safest level of function  10/12/2023 0012 by Addis Womack RN  Outcome: Progressing  10/11/2023 1312 by Violetta Charles RN  Outcome: Progressing  Flowsheets (Taken 10/11/2023 1312)  Return Mobility to Safest Level of Function:   Assess patient stability and activity tolerance for standing, transferring and ambulating with or without assistive devices   Assist with transfers and ambulation using safe patient handling equipment as needed   Ensure adequate protection for wounds/incisions during mobilization   Obtain physical therapy/occupational therapy consults as needed   Instruct patient/family in ordered activity level  Goal: Maintain proper alignment of affected body part  Outcome: Progressing  Goal: Return ADL status to a safe level of function  10/12/2023 0012 by Addis Womack RN  Outcome: Progressing  10/11/2023 1312 by Violetta Charles RN  Outcome: Progressing  Flowsheets (Taken 10/11/2023 1312)  Return

## 2023-10-12 NOTE — PROGRESS NOTES
Occupational Therapy  OCCUPATIONAL THERAPY  Progress Note   Second Session    Patient Name: [address] [place] [address] Record Number: [id number]    Treatment Diagnosis: Impaired ADL/IADL function, functional mob/transfers. General  Chart Reviewed: Yes  Patient assessed for rehabilitation services?: Yes  Additional Pertinent Hx: \"Patient is a 69 yo F with pmh CIDP, diastolic dysfunction, HTN, HLD, Asthma, GERD, obesity s/p gastric bypass who initially presented 10/6/2023 after being found down at home. Patient unable to provide details about circumstances of fall but states it was mechanical and she was down for 2 days prior to being found. She was unable to get up due to leg weakness. She denies loss of consciousness. She was found to have 3 mm thick bilateral supratentorial hemispheric subdural hematomas. There was initial concern for stroke but MRI was negative for acute infarct. Neurology is recommending holding Plavix until 10/18. Course complicated by rhabdomyolysis, GAIL, hyponatremia. Now presents to ARU with impaired mobility and self-care below her baseline. Currently, patient reports generalized weakness and difficulty with balance. She has chronic numbness/tingling in distal extremities (up to elbows and knees). Also reports joint pain since hospitalized which she attributes to being on atorvastatin. She denies headache, vision changes, difficulty with speech, swallow, or memory. \" Copied per Dr. Katy Patterson note. Family / Caregiver Present: No  Referring Practitioner: Dr. Trevin Copeland  Diagnosis: s/p fall, Bilateral SDH     Restrictions/Precautions  Restrictions/Precautions: Fall Risk        Position Activity Restriction  Other position/activity restrictions: H/O GBS (per pt report). RUE IV    Subjective: Pt met in therapy dept for OT PM tx session. PT agreeable to OT tx, pt c/o of 10/10 pain in knees. Pt expresses frustration w/ not being told that she will not receive therapy on Sunday.  She states she did not

## 2023-10-12 NOTE — PLAN OF CARE
Problem: Discharge Planning  Goal: Discharge to home or other facility with appropriate resources  10/12/2023 1042 by Vy Rodriguez RN  Outcome: Progressing  Flowsheets (Taken 10/12/2023 1042)  Discharge to home or other facility with appropriate resources: Identify discharge learning needs (meds, wound care, etc)     Problem: Safety - Adult  Goal: Free from fall injury  10/12/2023 1042 by Vy Rodriguez RN  Outcome: Progressing  Flowsheets (Taken 10/12/2023 1042)  Free From Fall Injury:   Instruct family/caregiver on patient safety   Based on caregiver fall risk screen, instruct family/caregiver to ask for assistance with transferring infant if caregiver noted to have fall risk factors     Problem: Pain  Goal: Verbalizes/displays adequate comfort level or baseline comfort level  10/12/2023 1042 by Vy Rodriguez RN  Outcome: Progressing  Flowsheets (Taken 10/12/2023 1042)  Verbalizes/displays adequate comfort level or baseline comfort level:   Encourage patient to monitor pain and request assistance   Assess pain using appropriate pain scale   Administer analgesics based on type and severity of pain and evaluate response   Implement non-pharmacological measures as appropriate and evaluate response   Consider cultural and social influences on pain and pain management   Notify Licensed Independent Practitioner if interventions unsuccessful or patient reports new pain     Problem: Skin/Tissue Integrity  Goal: Absence of new skin breakdown  Description: 1. Monitor for areas of redness and/or skin breakdown  2. Assess vascular access sites hourly  3. Every 4-6 hours minimum:  Change oxygen saturation probe site  4. Every 4-6 hours:  If on nasal continuous positive airway pressure, respiratory therapy assess nares and determine need for appliance change or resting period. 10/12/2023 1042 by Vy Rodriguez RN  Outcome: Progressing  Note: Pt is at risk for impaired skin integrity.  Assess skin every shift and prn. Turn every 2 hours. Keep heels off bed. Keep skin clean and dry.       Problem: ABCDS Injury Assessment  Goal: Absence of physical injury  10/12/2023 1042 by Jarocho Priest RN  Outcome: Progressing  Flowsheets (Taken 10/12/2023 1042)  Absence of Physical Injury: Implement safety measures based on patient assessment     Problem: Neurosensory - Adult  Goal: Achieves stable or improved neurological status  10/12/2023 1042 by Jarocho Priest RN  Outcome: Progressing  Flowsheets (Taken 10/12/2023 1042)  Achieves stable or improved neurological status:   Assess for and report changes in neurological status   Maintain blood pressure and fluid volume within ordered parameters to optimize cerebral perfusion and minimize risk of hemorrhage     Problem: Respiratory - Adult  Goal: Achieves optimal ventilation and oxygenation  10/12/2023 1042 by Jarocho Priest RN  Outcome: Progressing  Flowsheets (Taken 10/12/2023 1042)  Achieves optimal ventilation and oxygenation: Assess for changes in respiratory status     Problem: Cardiovascular - Adult  Goal: Maintains optimal cardiac output and hemodynamic stability  10/12/2023 1042 by Jarocho Priest RN  Outcome: Progressing  Flowsheets (Taken 10/12/2023 1042)  Maintains optimal cardiac output and hemodynamic stability: Monitor blood pressure and heart rate     Problem: Skin/Tissue Integrity - Adult  Goal: Skin integrity remains intact  10/12/2023 1042 by Jarocho Priest RN  Outcome: Progressing  Flowsheets (Taken 10/12/2023 1042)  Skin Integrity Remains Intact: Monitor for areas of redness and/or skin breakdown     Problem: Musculoskeletal - Adult  Goal: Return mobility to safest level of function  10/12/2023 1042 by Jarocho Priest RN  Outcome: Progressing  Flowsheets (Taken 10/12/2023 1042)  Return Mobility to Safest Level of Function:   Assess patient stability and activity tolerance for standing, transferring and ambulating with or without assistive

## 2023-10-12 NOTE — FLOWSHEET NOTE
Patient admitted to rehab with subdural hematoma. A/Ox4. Transfers with walker x1. Mobility restrictions: WBAT. On regular diet, tolerating well. Medications taken whole with thins. On none for DVT prophylaxis. Skin: scattered abrasions L elbow, L eye, buttocks. Oxygen: RA. LDA: PIV RAC. Has been continent of bowel and ocntinent of bladder. LBM 10/10. Chair/bed alarms in use and call light in reach.

## 2023-10-12 NOTE — PROGRESS NOTES
joints \"aching\" from \"lipitor\", and rates pain, 10/10. Pt c/o chronic knee pain. (not rated). Pt had her walker from home with her, RW, with  basket. Restrictions/Precautions: Fall Risk             Objective     Cognition  Overall Cognitive Status: Exceptions  Arousal/Alertness: Appropriate responses to stimuli  Following Commands: Follows all commands without difficulty  Attention Span: Appears intact  Safety Judgement: Decreased awareness of need for safety;Decreased awareness of need for assistance  Insights: Decreased awareness of deficits  Orientation  Overall Orientation Status: Within Normal Limits         ADL  Grooming/Oral Hygiene  Assistance Level: Contact guard assist;Stand by assist  Skilled Clinical Factors: stance at sink to wash hands. Tended to perservate on washing, drying hands. Putting On/Taking Off Footwear  Assistance Level: Stand by assist;Set-up  Skilled Clinical Factors: Pt sat from ercliner and doff/donned socks and shoes with SBA, min effort. Toileting  Assistance Level: Contact guard assist  Skilled Clinical Factors: Pt voided urine on commode in OT dept BR. Pt sat to complete hygiene and CGA in stance for managing clothes over hips. Toilet Transfers  Technique:  (Amb with RW)  Equipment: [employer] safety frame (comfort ht commode)  Assistance Level: Contact guard assist;Stand by assist         Functional Mobility  Device: Rolling walker  Activity: To/From bathroom  Assistance Level: Contact guard assist  Skilled Clinical Factors: RW in dept and to/from BR 2x. Pt needing min cues for safety, not to leave walker, when approaching sink to wash hands in BR. Pt reached to open door of refridgerator, when amb thru kitchen, w/CGA, (did not practice reaching for items). Pt does have her RW here, w/walker basket and discussed cooking in future sessions.   Bed Mobility  Overall Assistance Level: Contact Guard Assist (Reports does not sleep flat, uses 3 pillows to keep head safely. Short Term Goals  Time Frame for Short Term Goals: Prior to D/C (7-10 days)  Short Term Goal 1: Pt will complete bathing modified independent w/ use of shower chair/TTB, long-handled sponge/grab bars as needed. Short Term Goal 2: Pt will complete dressing modified independent w/ use of reacher/long-handled shoe horn/sock aid as needed. Short Term Goal 3: Pt will complete toileting modified independent w/ use of grab bars as needed. Short Term Goal 4: Pt will perform bed mobility modified independent with or without use of leg . Short Term Goal 5: Pt will perform HEP/ther ex/ participate in walker safety ed to increase activity tolerance & safety to perform simple IADL tasks(food prep/home mgmt.) modified independent standing for 10 minutes. Therapy Time   Individual Concurrent Group Co-treatment   Time In 0915         Time Out 1000         Minutes 39                 Adalberto KHALIL/L,515    OTR was consulted with this patients treatment/intervention plan.

## 2023-10-13 LAB
ANION GAP SERPL CALCULATED.3IONS-SCNC: 14 MMOL/L (ref 3–16)
ANION GAP SERPL CALCULATED.3IONS-SCNC: 14 MMOL/L (ref 3–16)
BASOPHILS # BLD: 0 K/UL (ref 0–0.2)
BASOPHILS NFR BLD: 0 %
BUN SERPL-MCNC: 20 MG/DL (ref 7–20)
BUN SERPL-MCNC: 22 MG/DL (ref 7–20)
CALCIUM SERPL-MCNC: 9.5 MG/DL (ref 8.3–10.6)
CALCIUM SERPL-MCNC: 9.6 MG/DL (ref 8.3–10.6)
CHLORIDE SERPL-SCNC: 95 MMOL/L (ref 99–110)
CHLORIDE SERPL-SCNC: 99 MMOL/L (ref 99–110)
CO2 SERPL-SCNC: 25 MMOL/L (ref 21–32)
CO2 SERPL-SCNC: 25 MMOL/L (ref 21–32)
CREAT SERPL-MCNC: 0.6 MG/DL (ref 0.6–1.2)
CREAT SERPL-MCNC: 0.9 MG/DL (ref 0.6–1.2)
DEPRECATED RDW RBC AUTO: 16.6 % (ref 12.4–15.4)
EOSINOPHIL # BLD: 0.2 K/UL (ref 0–0.6)
EOSINOPHIL NFR BLD: 3 %
GFR SERPLBLD CREATININE-BSD FMLA CKD-EPI: >60 ML/MIN/{1.73_M2}
GFR SERPLBLD CREATININE-BSD FMLA CKD-EPI: >60 ML/MIN/{1.73_M2}
GLUCOSE SERPL-MCNC: 86 MG/DL (ref 70–99)
GLUCOSE SERPL-MCNC: 93 MG/DL (ref 70–99)
HCT VFR BLD AUTO: 33.2 % (ref 36–48)
HGB BLD-MCNC: 10.9 G/DL (ref 12–16)
LYMPHOCYTES # BLD: 1.2 K/UL (ref 1–5.1)
LYMPHOCYTES NFR BLD: 15 %
MAGNESIUM SERPL-MCNC: 1.8 MG/DL (ref 1.8–2.4)
MCH RBC QN AUTO: 28 PG (ref 26–34)
MCHC RBC AUTO-ENTMCNC: 32.9 G/DL (ref 31–36)
MCV RBC AUTO: 85.1 FL (ref 80–100)
METAMYELOCYTES NFR BLD MANUAL: 3 %
MONOCYTES # BLD: 0.7 K/UL (ref 0–1.3)
MONOCYTES NFR BLD: 10 %
NEUTROPHILS # BLD: 5.2 K/UL (ref 1.7–7.7)
NEUTROPHILS NFR BLD: 62 %
NEUTS BAND NFR BLD MANUAL: 6 % (ref 0–7)
PLATELET # BLD AUTO: 476 K/UL (ref 135–450)
PLATELET BLD QL SMEAR: ABNORMAL
PMV BLD AUTO: 8.6 FL (ref 5–10.5)
POTASSIUM SERPL-SCNC: 3.9 MMOL/L (ref 3.5–5.1)
POTASSIUM SERPL-SCNC: 4.2 MMOL/L (ref 3.5–5.1)
RBC # BLD AUTO: 3.9 M/UL (ref 4–5.2)
SLIDE REVIEW: ABNORMAL
SODIUM SERPL-SCNC: 134 MMOL/L (ref 136–145)
SODIUM SERPL-SCNC: 138 MMOL/L (ref 136–145)
VARIANT LYMPHS NFR BLD MANUAL: 1 % (ref 0–6)
WBC # BLD AUTO: 7.3 K/UL (ref 4–11)

## 2023-10-13 PROCEDURE — 83735 ASSAY OF MAGNESIUM: CPT

## 2023-10-13 PROCEDURE — 85025 COMPLETE CBC W/AUTO DIFF WBC: CPT

## 2023-10-13 PROCEDURE — 1280000000 HC REHAB R&B

## 2023-10-13 PROCEDURE — 2580000003 HC RX 258: Performed by: PHYSICAL MEDICINE & REHABILITATION

## 2023-10-13 PROCEDURE — 94640 AIRWAY INHALATION TREATMENT: CPT

## 2023-10-13 PROCEDURE — 97116 GAIT TRAINING THERAPY: CPT

## 2023-10-13 PROCEDURE — 97535 SELF CARE MNGMENT TRAINING: CPT

## 2023-10-13 PROCEDURE — 97129 THER IVNTJ 1ST 15 MIN: CPT

## 2023-10-13 PROCEDURE — 80048 BASIC METABOLIC PNL TOTAL CA: CPT

## 2023-10-13 PROCEDURE — 36415 COLL VENOUS BLD VENIPUNCTURE: CPT

## 2023-10-13 PROCEDURE — 97110 THERAPEUTIC EXERCISES: CPT

## 2023-10-13 PROCEDURE — 97130 THER IVNTJ EA ADDL 15 MIN: CPT

## 2023-10-13 PROCEDURE — 6370000000 HC RX 637 (ALT 250 FOR IP): Performed by: PHYSICAL MEDICINE & REHABILITATION

## 2023-10-13 PROCEDURE — 97530 THERAPEUTIC ACTIVITIES: CPT

## 2023-10-13 RX ORDER — FUROSEMIDE 20 MG/1
10 TABLET ORAL DAILY
Status: DISCONTINUED | OUTPATIENT
Start: 2023-10-14 | End: 2023-10-14

## 2023-10-13 RX ORDER — SIMVASTATIN 10 MG
10 TABLET ORAL NIGHTLY
Status: DISCONTINUED | OUTPATIENT
Start: 2023-10-13 | End: 2023-10-17 | Stop reason: HOSPADM

## 2023-10-13 RX ADMIN — PANTOPRAZOLE SODIUM 40 MG: 40 TABLET, DELAYED RELEASE ORAL at 05:43

## 2023-10-13 RX ADMIN — AMLODIPINE BESYLATE 5 MG: 5 TABLET ORAL at 08:17

## 2023-10-13 RX ADMIN — CALCIUM 500 MG: 500 TABLET ORAL at 08:16

## 2023-10-13 RX ADMIN — HYDROMORPHONE HYDROCHLORIDE 4 MG: 4 TABLET ORAL at 10:29

## 2023-10-13 RX ADMIN — Medication 10 MG: at 20:25

## 2023-10-13 RX ADMIN — CALCIUM 500 MG: 500 TABLET ORAL at 20:25

## 2023-10-13 RX ADMIN — HYDROMORPHONE HYDROCHLORIDE 4 MG: 4 TABLET ORAL at 17:18

## 2023-10-13 RX ADMIN — METOPROLOL TARTRATE 100 MG: 50 TABLET ORAL at 08:15

## 2023-10-13 RX ADMIN — PREDNISONE 2 MG: 1 TABLET ORAL at 08:50

## 2023-10-13 RX ADMIN — SENNOSIDES AND DOCUSATE SODIUM 1 TABLET: 50; 8.6 TABLET ORAL at 20:25

## 2023-10-13 RX ADMIN — HYDROMORPHONE HYDROCHLORIDE 4 MG: 4 TABLET ORAL at 21:13

## 2023-10-13 RX ADMIN — FERROUS SULFATE TAB EC 324 MG (65 MG FE EQUIVALENT) 324 MG: 324 (65 FE) TABLET DELAYED RESPONSE at 08:16

## 2023-10-13 RX ADMIN — MOMETASONE FUROATE AND FORMOTEROL FUMARATE DIHYDRATE 2 PUFF: 200; 5 AEROSOL RESPIRATORY (INHALATION) at 20:06

## 2023-10-13 RX ADMIN — SODIUM CHLORIDE, PRESERVATIVE FREE 10 ML: 5 INJECTION INTRAVENOUS at 08:20

## 2023-10-13 RX ADMIN — MONTELUKAST 10 MG: 10 TABLET, FILM COATED ORAL at 20:25

## 2023-10-13 RX ADMIN — CETIRIZINE HYDROCHLORIDE 10 MG: 10 TABLET ORAL at 08:16

## 2023-10-13 RX ADMIN — HYDROMORPHONE HYDROCHLORIDE 4 MG: 4 TABLET ORAL at 05:43

## 2023-10-13 RX ADMIN — SENNOSIDES AND DOCUSATE SODIUM 1 TABLET: 50; 8.6 TABLET ORAL at 08:17

## 2023-10-13 RX ADMIN — FENOFIBRATE 160 MG: 160 TABLET ORAL at 08:17

## 2023-10-13 RX ADMIN — ACETAMINOPHEN 650 MG: 325 TABLET ORAL at 08:16

## 2023-10-13 RX ADMIN — ACETAMINOPHEN 650 MG: 325 TABLET ORAL at 00:41

## 2023-10-13 ASSESSMENT — PAIN DESCRIPTION - DESCRIPTORS
DESCRIPTORS: ACHING;SHARP
DESCRIPTORS: ACHING

## 2023-10-13 ASSESSMENT — PAIN SCALES - GENERAL
PAINLEVEL_OUTOF10: 5
PAINLEVEL_OUTOF10: 0
PAINLEVEL_OUTOF10: 3
PAINLEVEL_OUTOF10: 10
PAINLEVEL_OUTOF10: 7
PAINLEVEL_OUTOF10: 10
PAINLEVEL_OUTOF10: 7
PAINLEVEL_OUTOF10: 9
PAINLEVEL_OUTOF10: 7
PAINLEVEL_OUTOF10: 7
PAINLEVEL_OUTOF10: 10

## 2023-10-13 ASSESSMENT — PAIN DESCRIPTION - ONSET
ONSET: ON-GOING

## 2023-10-13 ASSESSMENT — PAIN DESCRIPTION - LOCATION
LOCATION: LEG
LOCATION: KNEE
LOCATION: KNEE
LOCATION: LEG
LOCATION: LEG
LOCATION: HEAD

## 2023-10-13 ASSESSMENT — PAIN DESCRIPTION - FREQUENCY
FREQUENCY: CONTINUOUS

## 2023-10-13 ASSESSMENT — PAIN - FUNCTIONAL ASSESSMENT
PAIN_FUNCTIONAL_ASSESSMENT: ACTIVITIES ARE NOT PREVENTED
PAIN_FUNCTIONAL_ASSESSMENT: PREVENTS OR INTERFERES SOME ACTIVE ACTIVITIES AND ADLS
PAIN_FUNCTIONAL_ASSESSMENT: ACTIVITIES ARE NOT PREVENTED
PAIN_FUNCTIONAL_ASSESSMENT: PREVENTS OR INTERFERES SOME ACTIVE ACTIVITIES AND ADLS
PAIN_FUNCTIONAL_ASSESSMENT: PREVENTS OR INTERFERES SOME ACTIVE ACTIVITIES AND ADLS
PAIN_FUNCTIONAL_ASSESSMENT: ACTIVITIES ARE NOT PREVENTED

## 2023-10-13 ASSESSMENT — PAIN DESCRIPTION - ORIENTATION
ORIENTATION: LEFT;RIGHT
ORIENTATION: RIGHT;LEFT
ORIENTATION: RIGHT;LEFT
ORIENTATION: LEFT;RIGHT
ORIENTATION: LEFT;RIGHT
ORIENTATION: MID

## 2023-10-13 ASSESSMENT — PAIN DESCRIPTION - PAIN TYPE
TYPE: CHRONIC PAIN
TYPE: ACUTE PAIN

## 2023-10-13 ASSESSMENT — PAIN SCALES - WONG BAKER: WONGBAKER_NUMERICALRESPONSE: 0

## 2023-10-13 NOTE — PLAN OF CARE
Monitor blood pressure and heart rate  10/12/2023 1042 by Kimberlyn Lehman RN  Outcome: Progressing  Flowsheets (Taken 10/12/2023 1042)  Maintains optimal cardiac output and hemodynamic stability: Monitor blood pressure and heart rate     Problem: Skin/Tissue Integrity - Adult  Goal: Skin integrity remains intact  10/13/2023 0031 by Ayo Conway RN  Outcome: Progressing  Flowsheets (Taken 10/12/2023 2002)  Skin Integrity Remains Intact: Monitor for areas of redness and/or skin breakdown  10/12/2023 1042 by Kimberlyn Lehman RN  Outcome: Progressing  Flowsheets (Taken 10/12/2023 1042)  Skin Integrity Remains Intact: Monitor for areas of redness and/or skin breakdown     Problem: Musculoskeletal - Adult  Goal: Return mobility to safest level of function  10/13/2023 0031 by Ayo Conway RN  Outcome: Progressing  Flowsheets (Taken 10/12/2023 2002)  Return Mobility to Safest Level of Function: Assess patient stability and activity tolerance for standing, transferring and ambulating with or without assistive devices  10/12/2023 1042 by Kimberlyn Lehman RN  Outcome: Progressing  Flowsheets (Taken 10/12/2023 1042)  Return Mobility to Safest Level of Function:   Assess patient stability and activity tolerance for standing, transferring and ambulating with or without assistive devices   Assist with transfers and ambulation using safe patient handling equipment as needed  Goal: Return ADL status to a safe level of function  Outcome: Progressing  Flowsheets (Taken 10/12/2023 2002)  Return ADL Status to a Safe Level of Function:   Assess activities of daily living deficits and provide assistive devices as needed   Administer medication as ordered     Problem: Gastrointestinal - Adult  Goal: Maintains or returns to baseline bowel function  Outcome: Progressing  Flowsheets (Taken 10/12/2023 2002)  Maintains or returns to baseline bowel function: Assess bowel function     Problem: Genitourinary - Adult  Goal: Absence of urinary retention  Outcome: Progressing     Problem: Metabolic/Fluid and Electrolytes - Adult  Goal: Electrolytes maintained within normal limits  10/12/2023 1042 by Bay Martinez RN  Outcome: Progressing  Flowsheets (Taken 10/12/2023 1042)  Electrolytes maintained within normal limits:   Monitor labs and assess patient for signs and symptoms of electrolyte imbalances   Administer electrolyte replacement as ordered   Fluid restriction as ordered

## 2023-10-13 NOTE — PROGRESS NOTES
Patient admitted to rehab with subdural hematoma. A/Ox4. Transfers with one assist with gait belt and front wheeled walker for ambulation. Mobility restrictions: WBAT. On regular 2000cc FR diet, tolerating fair,. Medications taken whole with fluids. Medications being adjusted per her request.   Skin: scattered bruising and abrasion to left elbow. Oxygen: room air. LDA: none. Has been continent of bowel and  of bladder. LBM 10/11. Chair/bed alarms in use and call light in reach. Will monitor for safety.

## 2023-10-13 NOTE — PROGRESS NOTES
ACUTE REHAB UNIT  SPEECH/LANGUAGE PATHOLOGY      [x] Daily  [] Weekly Care Conference Note  [] Discharge    Patient:Malika Ocampo      [id number]  CARLOS:[id number]  Rehab Dx/Hx: Subdural hematoma ([address]) [S06. 5XAA]    Precautions: falls  Home situation: lives alone, independent with IADLs  ST Dx: [] Aphasia  [] Dysarthria  [] Apraxia   [] Oropharyngeal dysphagia [x] Cognitive   Impairment  [] Other:   Initial Speech Therapy Assessment Diagnosis:   1. Cognitive Diagnosis: Functional temporal and spatial orientation, concrete math language, and concrete VPS/reasoning. Pt with mild deficits in higher level attention, complex PS/reasoning, complex math language/numeric reasoning; organization, and working memory. These deficits could impact functional IND in executive function warranted for adv DL tasks; pt lives alone and reports she will return home alone and remain independent. Will continue therapy and ongoing assessment  2. Aphasia Diagnosis: Functional complex auditory comprehension and verbal expression for tasks of graded complexity. 3. Speech Diagnosis: Audible intelligible connected speech. 4. Communication Diagnosis: Functional concrete language skills for auditory/visual language processing and verbal expressive language skills     Date of Admit: 10/10/2023  Room #: A5K-6075/3262-01  Date: 10/13/2023       Current functional status (updated daily):         Current Diet Order:ADULT DIET;  Regular; 2000 ml     Behavior: [x] Alert  [x] Cooperative  []  Pleasant  [] Confused  [] Agitated  [] Uncooperative  [] Distractible [] Motivated  [] Self-Limiting [] Anxious  [] Other:  Endurance:  [x] Adequate for participation in SLP sessions  [] Reduced overall  [] Lethargic  [] Other:  Safety: [x] No concerns at this time  [] Reduced insight into deficits  []  Reduced safety awareness [] Not following call light procedures  [] Unable to Assess  [] Other:  Swallowing Precautions: Sit up for all meals and thereafter for Interventions used during Rehab Stay:  [] Speech/Language Treatment  [] Instruction in HEP [] Group [] Dysphagia Treatment [x] Cognitive Treatment   [] Other:      Electronically Signed by    Sanford Sánchez. William,MS,CCC,SLP [id number]  Speech and Language Pathologist

## 2023-10-13 NOTE — PROGRESS NOTES
Occupational Therapy  Facility/Department: [facility] REHAB  Rehabilitation Occupational Therapy Daily Treatment Note    AM and PM Sessions    Date: 10/13/23  Patient Name: Arnie Sam       Room: V2O-8413/7226-54  MRN: [id number]  Account: [de-identified]   : 1955  (74 y.o.) Gender: female                    Past Medical History:  has a past medical history of Asthma, Chronic inflammatory demyelinating polyneuritis ([address]), Diastolic dysfunction, GERD (gastroesophageal reflux disease), Hypertension, Hypertriglyceridemia, Neuropathy, peroneal nerve, Obesity, Polyneuropathy, Sacral insufficiency fracture, and Spondylolisthesis, grade 1. Past Surgical History:   has a past surgical history that includes Nerve Biopsy; Tonsillectomy; Foot surgery; and Gastric bypass surgery (2019). Restrictions  Restrictions/Precautions: Fall Risk  Other position/activity restrictions: H/O GBS (per pt report). RUE IV  Equipment Used:  (RW)    Subjective  Subjective: Pt met in room, pt seated in recliner. Pt states that her legs are very swollen and painful, rates pain 10/10. Pt states that she has not been given Lasix medication. Pt states that she has already completed a sponge bath, oral hygiene, toileting on her own this AM prior to OT tx session. (Yesterday PM OT & S/OT discussed plan for AM ADL session at 7:15 & she indicated this was OK with her.) Nurse in near end fo session to adminster he Tylenol per her request.  Restrictions/Precautions: Fall Risk             Objective     Cognition  Overall Cognitive Status: WFL  Arousal/Alertness: Appropriate responses to stimuli  Following Commands:  Follows all commands without difficulty  Attention Span: Appears intact  Safety Judgement: Decreased awareness of need for safety;Decreased awareness of need for assistance  Insights: Decreased awareness of deficits  Orientation  Overall Orientation Status: Within Normal Limits         ADL  Feeding  Assistance Level: Set-up  Skilled Clinical Factors: Pt's breakfast arrived toward start of session. Pt ate breakfast seated in recliner and ate w/ set-up to retrieve/open milk. Putting On/Taking Off Footwear  Assistance Level: Supervision; Increased time to complete  Skilled Clinical Factors: Pt sat in recliner to doff shoes & don socks and shoes w/ supervision assist. Pt took increased time to complete as she was in pain. Toileting  Assistance Level: Stand by assist          Functional Mobility  Device: Rolling walker  Activity: To/From bathroom  Assistance Level: Stand by assist  Skilled Clinical Factors: Pt amb with RW >< toilet in bathroom > sink for grooming> recliner chair all SBA. Bed To/From Chair  Technique:  (amb)  Assistance Level: Stand by assist  Skilled Clinical Factors: recliner > RW > recliner SBA   OT Exercises  Exercise Treatment: Pt was issued Theraband HEP printed copy and lime green theraband to complete in room this weekend. Pt completed all ex following hand out.(shoulder flex/chesst press, tricep ext, horizontal abd, bilateral horizontal abd, bicep curls, shoulder ext). HEP completed to increase strength/activity tolerance for ADL/IADL tasks from RW. Instructed her to stop any ex that causes pain. Assessment  Assessment  Assessment: Pt tolerated OT tx session well this AM. Pt had already gotten herself cleaned, dressed, (despite being informed yesterday would be coming to room to do with her at 7:15), so session was modified to address donning/doffing footwear, therapeutic UE exercises. Pt progressing w/ functional mob & ADL tasnfers at SBA from  this AM. She stil is below her baseline regarding activity tolerance and ADL/IADL function, limited by LE edema(declined huma hose) and LE pain, stiffness, and difficulty sleeping. Plan to cont tx per POC. Activity Tolerance: Patient limited by pain; Patient limited by endurance  Discharge Recommendations: Home with Home health OT; Home with assist PRN;S Level

## 2023-10-13 NOTE — PLAN OF CARE
Problem: Discharge Planning  Goal: Discharge to home or other facility with appropriate resources  10/13/2023 1345 by Colin Esqueda RN  Outcome: Progressing  Flowsheets (Taken 10/13/2023 1345)  Discharge to home or other facility with appropriate resources: Identify discharge learning needs (meds, wound care, etc)     Problem: Safety - Adult  Goal: Free from fall injury  10/13/2023 1345 by Colin Esqueda RN  Outcome: Progressing  Flowsheets (Taken 10/13/2023 1345)  Free From Fall Injury:   Instruct family/caregiver on patient safety   Based on caregiver fall risk screen, instruct family/caregiver to ask for assistance with transferring infant if caregiver noted to have fall risk factors     Problem: Pain  Goal: Verbalizes/displays adequate comfort level or baseline comfort level  10/13/2023 1345 by Colin Esqueda RN  Outcome: Progressing  Flowsheets (Taken 10/13/2023 1345)  Verbalizes/displays adequate comfort level or baseline comfort level:   Encourage patient to monitor pain and request assistance   Assess pain using appropriate pain scale   Administer analgesics based on type and severity of pain and evaluate response   Implement non-pharmacological measures as appropriate and evaluate response   Consider cultural and social influences on pain and pain management   Notify Licensed Independent Practitioner if interventions unsuccessful or patient reports new pain     Problem: Skin/Tissue Integrity  Goal: Absence of new skin breakdown  Description: 1. Monitor for areas of redness and/or skin breakdown  2. Assess vascular access sites hourly  3. Every 4-6 hours minimum:  Change oxygen saturation probe site  4. Every 4-6 hours:  If on nasal continuous positive airway pressure, respiratory therapy assess nares and determine need for appliance change or resting period. 10/13/2023 1345 by Colin Esqueda RN  Outcome: Progressing  Note: Pt is at risk for impaired skin integrity.  Assess skin every shift and prn. Turn every 2 hours. Keep heels off bed. Keep skin clean and dry.       Problem: ABCDS Injury Assessment  Goal: Absence of physical injury  10/13/2023 1345 by Bay Martinez RN  Outcome: Progressing  Flowsheets (Taken 10/13/2023 1345)  Absence of Physical Injury: Implement safety measures based on patient assessment     Problem: Neurosensory - Adult  Goal: Achieves stable or improved neurological status  10/13/2023 1345 by Bay Martinez RN  Outcome: Progressing  Flowsheets (Taken 10/13/2023 1345)  Achieves stable or improved neurological status:   Assess for and report changes in neurological status   Maintain blood pressure and fluid volume within ordered parameters to optimize cerebral perfusion and minimize risk of hemorrhage     Problem: Respiratory - Adult  Goal: Achieves optimal ventilation and oxygenation  Outcome: Progressing  Flowsheets (Taken 10/13/2023 1345)  Achieves optimal ventilation and oxygenation: Assess for changes in respiratory status     Problem: Musculoskeletal - Adult  Goal: Return mobility to safest level of function  10/13/2023 0031 by Meir Guillen RN  Outcome: Progressing  Flowsheets (Taken 10/12/2023 2002)  Return Mobility to Safest Level of Function: Assess patient stability and activity tolerance for standing, transferring and ambulating with or without assistive devices     Problem: Musculoskeletal - Adult  Goal: Return mobility to safest level of function  10/13/2023 0031 by Meir Guillen RN  Outcome: Progressing  Flowsheets (Taken 10/12/2023 2002)  Return Mobility to Safest Level of Function: Assess patient stability and activity tolerance for standing, transferring and ambulating with or without assistive devices     Problem: Metabolic/Fluid and Electrolytes - Adult  Goal: Electrolytes maintained within normal limits  Outcome: Progressing  Flowsheets (Taken 10/13/2023 1345)  Electrolytes maintained within normal limits:   Monitor labs and assess patient

## 2023-10-13 NOTE — PROGRESS NOTES
Patient admitted to rehab with subdural hematoma. A/Ox4. Transfers with no device, ambulates with RW. Mobility restrictions: WBAT. On regular  diet, 2000 ml Fluid restriction, education provided follow up needed, tolerating well. Medications taken whole. Skin: intct. Oxygen: room air. LDA: PIV to right forearm remains patent. Has been continent of bowel and  bladder. LBM 10/11. Chair/bed alarms in use and call light in reach. Will monitor for safety.  Electronically signed by Sonia West RN, [address] on 10/13/23 at 12:44 AM EDT

## 2023-10-13 NOTE — PROGRESS NOTES
wheelchair. SPT transported patient to therapy gym in wheelchair. Performed sit to stand from wheelchair with CGA. Took two trials for patient to get fully upright. Performed standing balance activity reaching outside base of support and tossing beanbags. Patient able to stand for 2 min with SBA and unilateral hand support without loss of balance. Performed another trial of standing balance reaching across body for 2 min. Performed standing exercises inside RW x15 each leg: heel raises, toe raises, marches, hip extension, hamstring curl. Patient performed sit to stand from wheelchair with RW with CGA. Patient able to ambulate 200' with RW with SBA. Demonstrated forward flexed posture and decreased jaci. Patient reported bilateral knee pain. Patient took seated rest break and then ambulated back to wheelchair 200' with RW with SBA. Requested to use bathroom. Patient ambulated short distance to restroom and performed pivot to commode with SBA. Removed lower body dressing with SBA without hand support on RW. Able to sit to stand from commode using handrails with SBA. Demonstrated good balance at sink while leaning for support. Ended session in wheelchair and transitioned to OT.     A: Tolerated session well with improved tolerance to activity. Will benefit from skilled therapy sessions prior to discharge to address decreased strength and endurance.      Safety Device - Type of devices:  []  All fall risk precautions in place [] Bed alarm in place  [] Call light within reach [] Chair alarm in place [] Positioning belt [x] Gait belt [] Patient at risk for falls [] Left in bed [x] Left in chair [] Telesitter in use [] Sitter present [] Nurse notified []  None    Second Session Therapy Time     Individual Co-treatment   Time In 1230     Time Out 1315     Minutes 39          AYE Ortiz, 10/13/23 at 11:29 AM     Therapist was present, directed the patient's care, made skilled judgement, and was responsible for assessment and treatment of the patient.         [facility], [id number]

## 2023-10-14 LAB
BASOPHILS # BLD: 0.1 K/UL (ref 0–0.2)
BASOPHILS NFR BLD: 1 %
DEPRECATED RDW RBC AUTO: 16.4 % (ref 12.4–15.4)
EOSINOPHIL # BLD: 0.2 K/UL (ref 0–0.6)
EOSINOPHIL NFR BLD: 3 %
HCT VFR BLD AUTO: 34.4 % (ref 36–48)
HGB BLD-MCNC: 11.3 G/DL (ref 12–16)
LYMPHOCYTES # BLD: 1.2 K/UL (ref 1–5.1)
LYMPHOCYTES NFR BLD: 18 %
MCH RBC QN AUTO: 28 PG (ref 26–34)
MCHC RBC AUTO-ENTMCNC: 32.8 G/DL (ref 31–36)
MCV RBC AUTO: 85.3 FL (ref 80–100)
METAMYELOCYTES NFR BLD MANUAL: 1 %
MONOCYTES # BLD: 0.4 K/UL (ref 0–1.3)
MONOCYTES NFR BLD: 6 %
NEUTROPHILS # BLD: 4.7 K/UL (ref 1.7–7.7)
NEUTROPHILS NFR BLD: 70 %
NEUTS BAND NFR BLD MANUAL: 1 % (ref 0–7)
PLATELET # BLD AUTO: 485 K/UL (ref 135–450)
PLATELET BLD QL SMEAR: ABNORMAL
PMV BLD AUTO: 8.5 FL (ref 5–10.5)
RBC # BLD AUTO: 4.03 M/UL (ref 4–5.2)
RBC MORPH BLD: NORMAL
SLIDE REVIEW: ABNORMAL
WBC # BLD AUTO: 6.5 K/UL (ref 4–11)

## 2023-10-14 PROCEDURE — 97535 SELF CARE MNGMENT TRAINING: CPT

## 2023-10-14 PROCEDURE — 97116 GAIT TRAINING THERAPY: CPT | Performed by: PHYSICAL THERAPIST

## 2023-10-14 PROCEDURE — 97110 THERAPEUTIC EXERCISES: CPT | Performed by: PHYSICAL THERAPIST

## 2023-10-14 PROCEDURE — 97530 THERAPEUTIC ACTIVITIES: CPT | Performed by: PHYSICAL THERAPIST

## 2023-10-14 PROCEDURE — 85025 COMPLETE CBC W/AUTO DIFF WBC: CPT

## 2023-10-14 PROCEDURE — 94640 AIRWAY INHALATION TREATMENT: CPT

## 2023-10-14 PROCEDURE — 94760 N-INVAS EAR/PLS OXIMETRY 1: CPT

## 2023-10-14 PROCEDURE — 1280000000 HC REHAB R&B

## 2023-10-14 PROCEDURE — 36415 COLL VENOUS BLD VENIPUNCTURE: CPT

## 2023-10-14 PROCEDURE — 97130 THER IVNTJ EA ADDL 15 MIN: CPT

## 2023-10-14 PROCEDURE — 97129 THER IVNTJ 1ST 15 MIN: CPT

## 2023-10-14 PROCEDURE — 6370000000 HC RX 637 (ALT 250 FOR IP): Performed by: PHYSICAL MEDICINE & REHABILITATION

## 2023-10-14 PROCEDURE — 97530 THERAPEUTIC ACTIVITIES: CPT

## 2023-10-14 PROCEDURE — 6370000000 HC RX 637 (ALT 250 FOR IP): Performed by: STUDENT IN AN ORGANIZED HEALTH CARE EDUCATION/TRAINING PROGRAM

## 2023-10-14 RX ORDER — FUROSEMIDE 20 MG/1
20 TABLET ORAL DAILY
Status: DISCONTINUED | OUTPATIENT
Start: 2023-10-15 | End: 2023-10-14

## 2023-10-14 RX ORDER — FUROSEMIDE 20 MG/1
20 TABLET ORAL DAILY
Status: DISCONTINUED | OUTPATIENT
Start: 2023-10-14 | End: 2023-10-17 | Stop reason: HOSPADM

## 2023-10-14 RX ADMIN — PANTOPRAZOLE SODIUM 40 MG: 40 TABLET, DELAYED RELEASE ORAL at 05:49

## 2023-10-14 RX ADMIN — FUROSEMIDE 10 MG: 20 TABLET ORAL at 07:20

## 2023-10-14 RX ADMIN — SENNOSIDES AND DOCUSATE SODIUM 1 TABLET: 50; 8.6 TABLET ORAL at 09:02

## 2023-10-14 RX ADMIN — CALCIUM 500 MG: 500 TABLET ORAL at 21:28

## 2023-10-14 RX ADMIN — Medication 10 MG: at 21:31

## 2023-10-14 RX ADMIN — FENOFIBRATE 160 MG: 160 TABLET ORAL at 09:01

## 2023-10-14 RX ADMIN — MOMETASONE FUROATE AND FORMOTEROL FUMARATE DIHYDRATE 2 PUFF: 200; 5 AEROSOL RESPIRATORY (INHALATION) at 08:30

## 2023-10-14 RX ADMIN — ACETAMINOPHEN 650 MG: 325 TABLET ORAL at 00:03

## 2023-10-14 RX ADMIN — PREDNISONE 2 MG: 1 TABLET ORAL at 09:01

## 2023-10-14 RX ADMIN — HYDROMORPHONE HYDROCHLORIDE 4 MG: 4 TABLET ORAL at 15:25

## 2023-10-14 RX ADMIN — METOPROLOL TARTRATE 100 MG: 50 TABLET ORAL at 09:00

## 2023-10-14 RX ADMIN — AMLODIPINE BESYLATE 5 MG: 5 TABLET ORAL at 09:01

## 2023-10-14 RX ADMIN — TIOTROPIUM BROMIDE INHALATION SPRAY 2 PUFF: 3.12 SPRAY, METERED RESPIRATORY (INHALATION) at 08:30

## 2023-10-14 RX ADMIN — ACETAMINOPHEN 650 MG: 325 TABLET ORAL at 08:59

## 2023-10-14 RX ADMIN — HYDROMORPHONE HYDROCHLORIDE 4 MG: 4 TABLET ORAL at 19:27

## 2023-10-14 RX ADMIN — SENNOSIDES AND DOCUSATE SODIUM 1 TABLET: 50; 8.6 TABLET ORAL at 21:28

## 2023-10-14 RX ADMIN — MONTELUKAST 10 MG: 10 TABLET, FILM COATED ORAL at 21:28

## 2023-10-14 RX ADMIN — HYDROMORPHONE HYDROCHLORIDE 4 MG: 4 TABLET ORAL at 23:26

## 2023-10-14 RX ADMIN — MOMETASONE FUROATE AND FORMOTEROL FUMARATE DIHYDRATE 2 PUFF: 200; 5 AEROSOL RESPIRATORY (INHALATION) at 19:12

## 2023-10-14 RX ADMIN — ACETAMINOPHEN 650 MG: 325 TABLET ORAL at 18:03

## 2023-10-14 RX ADMIN — HYDROMORPHONE HYDROCHLORIDE 4 MG: 4 TABLET ORAL at 10:05

## 2023-10-14 RX ADMIN — HYDROMORPHONE HYDROCHLORIDE 4 MG: 4 TABLET ORAL at 01:43

## 2023-10-14 RX ADMIN — HYDROMORPHONE HYDROCHLORIDE 4 MG: 4 TABLET ORAL at 05:49

## 2023-10-14 RX ADMIN — FERROUS SULFATE TAB EC 324 MG (65 MG FE EQUIVALENT) 324 MG: 324 (65 FE) TABLET DELAYED RESPONSE at 07:20

## 2023-10-14 RX ADMIN — CALCIUM 500 MG: 500 TABLET ORAL at 09:00

## 2023-10-14 RX ADMIN — FUROSEMIDE 20 MG: 20 TABLET ORAL at 10:47

## 2023-10-14 RX ADMIN — CETIRIZINE HYDROCHLORIDE 10 MG: 10 TABLET ORAL at 09:00

## 2023-10-14 ASSESSMENT — PAIN DESCRIPTION - LOCATION
LOCATION: LEG
LOCATION: LEG
LOCATION: LEG;KNEE;FOOT
LOCATION: GENERALIZED
LOCATION: LEG
LOCATION: HEAD
LOCATION: LEG
LOCATION: LEG;KNEE;FOOT
LOCATION: LEG;KNEE;ANKLE;FOOT

## 2023-10-14 ASSESSMENT — PAIN DESCRIPTION - DESCRIPTORS
DESCRIPTORS: ACHING;SHARP
DESCRIPTORS: ACHING
DESCRIPTORS: ACHING;SHARP
DESCRIPTORS: ACHING

## 2023-10-14 ASSESSMENT — PAIN SCALES - GENERAL
PAINLEVEL_OUTOF10: 9
PAINLEVEL_OUTOF10: 10
PAINLEVEL_OUTOF10: 3
PAINLEVEL_OUTOF10: 10
PAINLEVEL_OUTOF10: 9
PAINLEVEL_OUTOF10: 3
PAINLEVEL_OUTOF10: 10
PAINLEVEL_OUTOF10: 7
PAINLEVEL_OUTOF10: 10
PAINLEVEL_OUTOF10: 7
PAINLEVEL_OUTOF10: 10
PAINLEVEL_OUTOF10: 9
PAINLEVEL_OUTOF10: 7
PAINLEVEL_OUTOF10: 10
PAINLEVEL_OUTOF10: 10

## 2023-10-14 ASSESSMENT — PAIN DESCRIPTION - ORIENTATION
ORIENTATION: ANTERIOR
ORIENTATION: RIGHT;LEFT
ORIENTATION: LEFT;RIGHT
ORIENTATION: RIGHT;LEFT
ORIENTATION: LEFT;RIGHT

## 2023-10-14 ASSESSMENT — PAIN - FUNCTIONAL ASSESSMENT
PAIN_FUNCTIONAL_ASSESSMENT: PREVENTS OR INTERFERES SOME ACTIVE ACTIVITIES AND ADLS
PAIN_FUNCTIONAL_ASSESSMENT: PREVENTS OR INTERFERES WITH MANY ACTIVE NOT PASSIVE ACTIVITIES
PAIN_FUNCTIONAL_ASSESSMENT: PREVENTS OR INTERFERES SOME ACTIVE ACTIVITIES AND ADLS
PAIN_FUNCTIONAL_ASSESSMENT: PREVENTS OR INTERFERES SOME ACTIVE ACTIVITIES AND ADLS
PAIN_FUNCTIONAL_ASSESSMENT: ACTIVITIES ARE NOT PREVENTED
PAIN_FUNCTIONAL_ASSESSMENT: PREVENTS OR INTERFERES SOME ACTIVE ACTIVITIES AND ADLS
PAIN_FUNCTIONAL_ASSESSMENT: PREVENTS OR INTERFERES SOME ACTIVE ACTIVITIES AND ADLS

## 2023-10-14 ASSESSMENT — PAIN DESCRIPTION - PAIN TYPE
TYPE: CHRONIC PAIN
TYPE: ACUTE PAIN
TYPE: CHRONIC PAIN

## 2023-10-14 ASSESSMENT — PAIN DESCRIPTION - ONSET
ONSET: ON-GOING

## 2023-10-14 ASSESSMENT — PAIN DESCRIPTION - FREQUENCY
FREQUENCY: CONTINUOUS

## 2023-10-14 ASSESSMENT — PAIN SCALES - WONG BAKER: WONGBAKER_NUMERICALRESPONSE: 0

## 2023-10-14 NOTE — PLAN OF CARE
Problem: Discharge Planning  Goal: Discharge to home or other facility with appropriate resources  10/14/2023 1133 by Keanu Alvarez RN  Outcome: Progressing  Flowsheets (Taken 10/14/2023 1133)  Discharge to home or other facility with appropriate resources: Identify discharge learning needs (meds, wound care, etc)     Problem: Safety - Adult  Goal: Free from fall injury  10/14/2023 1133 by Keanu Alvarez RN  Outcome: Progressing  Flowsheets (Taken 10/14/2023 1133)  Free From Fall Injury:   Instruct family/caregiver on patient safety   Based on caregiver fall risk screen, instruct family/caregiver to ask for assistance with transferring infant if caregiver noted to have fall risk factors     Problem: Pain  Goal: Verbalizes/displays adequate comfort level or baseline comfort level  10/14/2023 1133 by Keanu Alvarez RN  Outcome: Progressing  Flowsheets (Taken 10/14/2023 1133)  Verbalizes/displays adequate comfort level or baseline comfort level:   Encourage patient to monitor pain and request assistance   Assess pain using appropriate pain scale   Administer analgesics based on type and severity of pain and evaluate response   Implement non-pharmacological measures as appropriate and evaluate response   Consider cultural and social influences on pain and pain management   Notify Licensed Independent Practitioner if interventions unsuccessful or patient reports new pain     Problem: Skin/Tissue Integrity  Goal: Absence of new skin breakdown  Description: 1. Monitor for areas of redness and/or skin breakdown  2. Assess vascular access sites hourly  3. Every 4-6 hours minimum:  Change oxygen saturation probe site  4. Every 4-6 hours:  If on nasal continuous positive airway pressure, respiratory therapy assess nares and determine need for appliance change or resting period. 10/14/2023 1133 by Keanu Alvarez RN  Outcome: Progressing  Note: Pt is at risk for impaired skin integrity.  Assess skin every

## 2023-10-14 NOTE — PROGRESS NOTES
Physical Therapy  Facility/Department: [address] REHAB  Rehabilitation Physical Therapy Treatment Note  - AM and PM Sessions      NAME: Melina Koroma  : 1955 (44 y.o.)  MRN: [id number]  CODE STATUS: Full Code    Date of Service: 10/14/23       Restrictions:  Restrictions/Precautions: Fall Risk  Position Activity Restriction  Other position/activity restrictions: H/O GBS (per pt report). RUE IV     Pertinent medical information:  Additional Pertinent Hx: Patient is a 69 yo F with pmh CIDP, diastolic dysfunction, HTN, HLD, Asthma, GERD, obesity s/p gastric bypass who initially presented 10/6/2023 after being found down at home. Patient unable to provide details about circumstances of fall but states it was mechanical and she was down for 2 days prior to being found. She was unable to get up due to leg weakness. She denies loss of consciousness. She was found to have 3 mm thick bilateral supratentorial hemispheric subdural hematomas. There was initial concern for stroke but MRI was negative for acute infarct. Neurology is recommending holding Plavix until 10/18. Course complicated by rhabdomyolysis, GAIL, hyponatremia. Now presents to ARU with impaired mobility and self-care below her baseline. Currently, patient reports generalized weakness and difficulty with balance. She has chronic numbness/tingling in distal extremities (up to elbows and knees). Also reports joint pain since hospitalized which she attributes to being on atorvastatin. She denies headache, vision changes, difficulty with speech, swallow, or memory. SUBJECTIVE  Subjective  Subjective: Patient lying in bed, complaining of swelling in ankles - reports that ankles are typically very thin. Frustrated that she was not taking her water pill earlier during her stay. Increased water pill per patient's report. Seems motivated to participate with therapy since wants to go home ASAP. Requesting to get dressed in pajamas.   Pain: States legs are comparison. Patient continues to express frustration about not initially taking several medications which she was previously taking at home prior to admission which patient feels has been impeding her progress in therapy. Will benefit from continued skilled therapy to improved tolerance to activity and functional mobility prior to discharge. Activity Tolerance: Patient limited by pain; Patient limited by endurance  Discharge Recommendations: Patient would benefit from continued therapy after discharge; Therapy recommended at discharge;Continue to assess pending progress  PT Equipment Recommendations  Equipment Needed: No  Other: Will cont to monitor pt's progress; would benefit from Alert System upon return home. Goals  Patient Goals   Patient Goals : Return home. Short Term Goals  Time Frame for Short Term Goals: 7-10 days- not met 10/13/23  Short Term Goal 1: Bed mobility with mod independence  Short Term Goal 2: Transfers with RW with modified independence. Short Term Goal 3: Ambulate 250' with RW with modified independence  Long Term Goals  Time Frame for Long Term Goals : Short term= long term    PLAN OF CARE/SAFETY  Physcial Therapy Plan  General Plan:  minutes of therapy at least 5 out of 7 days a week  Days Per Week: 5 Days  Hours Per Day: 1.5 hours  Current Treatment Recommendations: Strengthening; Therapeutic activities; Functional mobility training;Transfer training;Gait training; Safety education & training;Patient/Caregiver education & training;Balance training; Endurance training  Safety Devices  Type of Devices: Nurse notified; Patient at risk for falls;Call light within reach;Gait belt;Left in chair;Chair alarm in place (per patient request, heated up lunch and ensured all needs met)    EDUCATION  Education  Education Given To: Patient  Education Provided: Mobility Training;Transfer Training; Safety  Education Provided Comments: Educated patient on importance of mobility to maximize function [place]

## 2023-10-14 NOTE — PROGRESS NOTES
Occupational Therapy  Facility/Department: [facility]  REH[facility]  Rehabilitation Occupational Therapy Daily Treatment Note    Date: 10/14/23  Patient Name: Laura Sage       Room: Z8D-1872/0601-66  MRN: [id number]  Account: [de-identified]   : 1955  (74 y.o.) Gender: female                    Past Medical History:  has a past medical history of Asthma, Chronic inflammatory demyelinating polyneuritis ([address]), Diastolic dysfunction, GERD (gastroesophageal reflux disease), Hypertension, Hypertriglyceridemia, Neuropathy, peroneal nerve, Obesity, Polyneuropathy, Sacral insufficiency fracture, and Spondylolisthesis, grade 1. Past Surgical History:   has a past surgical history that includes Nerve Biopsy; Tonsillectomy; Foot surgery; and Gastric bypass surgery (2019). Restrictions  Restrictions/Precautions: Fall Risk  Other position/activity restrictions: H/O GBS (per pt report). RUE IV  Equipment Used:  (RW)    Subjective  Subjective: met in room, seated in recliner, watching TV  Restrictions/Precautions: Fall Risk             Objective     Cognition  Overall Cognitive Status: WFL  Following Commands: Follows all commands without difficulty  Attention Span: Appears intact  Safety Judgement: Decreased awareness of need for safety  Cognition Comment: can be set in her ways  Orientation  Overall Orientation Status: Within Normal Limits         ADL  Feeding  Assistance Level:  Independent  Skilled Clinical Factors: IND w/ feeding herself lunch  Grooming/Oral Hygiene  Assistance Level: Modified independent  Skilled Clinical Factors: Stood at sink x 8-10 minutes for washing hands, face, oral & hair care activities  Upper Extremity Bathing  Skilled Clinical Factors: pt declined, said she \"did a PTA bath already\"  Upper Extremity Dressing  Skilled Clinical Factors: pt already dressed herself prior to OT's session  Toileting  Assistance Level: Modified independent  Skilled Clinical Factors: able to manage clothing and

## 2023-10-14 NOTE — PROGRESS NOTES
with intermittent prompt   N/a   Goal 3: Pt will demonstrate improved VPS/PS/reasoning and numeric reasoning for graded cogntiive-lingistic tasks with set up; use of compensatory strategies and mild assist VPS/reasoning:   Complex PS using instructions  for spatial or sequential organization: setup; conditioning to task; and external cues  Working memory during PS tasks: external cues for use of strategies (ie:relevant versus irrelevant information; prioritizing information). External assist warranted  Math language:   Money exchanges: extra time and intermittent assist  Mild complex numeric reasoning: mild + assist (task attention and working memory/PS /calc)    Persistent concern for distractibility; pain /discomfort complaints ; and reduced mental flexibility/ making adaptations during tasks impacting goal directed behavior at times. Insight/memory/organization can further impact executive function. N/a   Other areas targeted:     Education:   Ongoing ed    Safety Devices: [x] Call light within reach  [] Chair alarm activated  [x] Bed alarm activated  [] Other: [] Call light within reach  [] Chair alarm activated  [] Bed alarm activated  [] Other:    Progress Assessment: 10/11/23: SLP cognitive evaluation complete; start trial tx for higher level cognition  10/13/2023: continue therapy targeting attention; concentration; working memory and STM; VPS/PS and reasoning  10/14/2023: Persistent concern for distractibility; pain /discomfort complaints ; and reduced mental flexibility/ making adaptations during tasks impacting goal directed behavior at times. Insight/memory/organization can further impact executive function. Concern for some baseline behavioral -cognitive - pragmatic issues based on pt self report   Plan: Continue as per plan of care.    Continued Tx Upon Discharge: ?    [] Yes [] No [x] TBD based on progress while on ARU [] Vital Stim indicated [] Other:   Estimated discharge date: TBD   Discharge recommendations:   [] Home independently  [x] Home with assistance []  24 hour supervision  [] ECF [] Other:     Additional information:     Interventions used during Rehab Stay:  [] Speech/Language Treatment  [] Instruction in HEP [] Group [] Dysphagia Treatment [x] Cognitive Treatment   [] Other:      Electronically Signed by    Carol Miller. WilliamMS,CCC,SLP [id number]  Speech and Language Pathologist

## 2023-10-14 NOTE — PROGRESS NOTES
Patient admitted to rehab with subdural hematoma. A/Ox4. Transfers with no device, ambulates with RW. Mobility restrictions: WBAT. On regular  diet, tolerating well. Medications taken whole. Skin: intct. Oxygen: room air. LDA:none Has been continent of bowel and  bladder. LBM 10/13. Chair/bed alarms in use and call light in reach. Will monitor for safety.  Electronically signed by Sanford Sanders RN, [address] on 10/13/23 at 10:53 PM EDT

## 2023-10-14 NOTE — PLAN OF CARE
Problem: Discharge Planning  Goal: Discharge to home or other facility with appropriate resources  10/13/2023 2249 by Snehal Arellano RN  Outcome: Progressing  10/13/2023 1345 by Kameron Ruiz RN  Outcome: Progressing  Flowsheets (Taken 10/13/2023 1345)  Discharge to home or other facility with appropriate resources: Identify discharge learning needs (meds, wound care, etc)     Problem: Safety - Adult  Goal: Free from fall injury  10/13/2023 2249 by Snehal Arellano RN  Outcome: Progressing  10/13/2023 1345 by Kameron Ruiz RN  Outcome: Progressing  Flowsheets (Taken 10/13/2023 1345)  Free From Fall Injury:   Edda Cottrell family/caregiver on patient safety   Based on caregiver fall risk screen, instruct family/caregiver to ask for assistance with transferring infant if caregiver noted to have fall risk factors     Problem: Pain  Goal: Verbalizes/displays adequate comfort level or baseline comfort level  10/13/2023 2249 by Snehal Arellano RN  Outcome: Progressing  10/13/2023 1345 by Kameron Ruiz RN  Outcome: Progressing  Flowsheets (Taken 10/13/2023 1345)  Verbalizes/displays adequate comfort level or baseline comfort level:   Encourage patient to monitor pain and request assistance   Assess pain using appropriate pain scale   Administer analgesics based on type and severity of pain and evaluate response   Implement non-pharmacological measures as appropriate and evaluate response   Consider cultural and social influences on pain and pain management   Notify Licensed Independent Practitioner if interventions unsuccessful or patient reports new pain     Problem: Skin/Tissue Integrity  Goal: Absence of new skin breakdown  Description: 1. Monitor for areas of redness and/or skin breakdown  2. Assess vascular access sites hourly  3. Every 4-6 hours minimum:  Change oxygen saturation probe site  4.   Every 4-6 hours:  If on nasal continuous positive airway pressure, respiratory therapy assess nares and determine need for appliance change or resting period. 10/13/2023 2249 by Claudia Mueller RN  Outcome: Progressing  10/13/2023 1345 by Francisco Kessler RN  Outcome: Progressing  Note: Pt is at risk for impaired skin integrity. Assess skin every shift and prn. Turn every 2 hours. Keep heels off bed. Keep skin clean and dry.       Problem: ABCDS Injury Assessment  Goal: Absence of physical injury  10/13/2023 2249 by Claudia Mueller RN  Outcome: Progressing  10/13/2023 1345 by Francisco Kessler RN  Outcome: Progressing  Flowsheets (Taken 10/13/2023 1345)  Absence of Physical Injury: Implement safety measures based on patient assessment     Problem: Neurosensory - Adult  Goal: Achieves stable or improved neurological status  10/13/2023 2249 by Claudia Mueller RN  Outcome: Progressing  10/13/2023 1345 by Francisco Kessler RN  Outcome: Progressing  Flowsheets (Taken 10/13/2023 1345)  Achieves stable or improved neurological status:   Assess for and report changes in neurological status   Maintain blood pressure and fluid volume within ordered parameters to optimize cerebral perfusion and minimize risk of hemorrhage  Goal: Absence of seizures  Outcome: Progressing  Goal: Remains free of injury related to seizures activity  Outcome: Progressing  Goal: Achieves maximal functionality and self care  Outcome: Progressing     Problem: Respiratory - Adult  Goal: Achieves optimal ventilation and oxygenation  10/13/2023 2249 by Claudia Mueller RN  Outcome: Progressing  10/13/2023 1345 by Francisco Kessler RN  Outcome: Progressing  Flowsheets (Taken 10/13/2023 1345)  Achieves optimal ventilation and oxygenation: Assess for changes in respiratory status     Problem: Cardiovascular - Adult  Goal: Maintains optimal cardiac output and hemodynamic stability  Outcome: Progressing  Goal: Absence of cardiac dysrhythmias or at baseline  Outcome: Progressing     Problem: Skin/Tissue Integrity - Adult  Goal: Skin integrity remains

## 2023-10-14 NOTE — PROGRESS NOTES
Patient admitted to rehab with subdural hematoma. A/Ox4. Transfers with no device, ambulates with RW. Mobility restrictions: WBAT. On regular  diet, 2000 ml Fluid restriction, education provided follow up needed, tolerating well. Medications taken whole. Skin: intct. Oxygen: room air. LDA: PIV to right forearm remains patent. Has been continent of bowel and  bladder. LBM 10/13. Chair/bed alarms in use and call light in reach. Will monitor for safety.  Electronically signed by Fredrick Goodson RN, [address] on 10/13/23 at 10:52 PM EDT

## 2023-10-15 PROCEDURE — 94760 N-INVAS EAR/PLS OXIMETRY 1: CPT

## 2023-10-15 PROCEDURE — 1280000000 HC REHAB R&B

## 2023-10-15 PROCEDURE — 6370000000 HC RX 637 (ALT 250 FOR IP): Performed by: PHYSICAL MEDICINE & REHABILITATION

## 2023-10-15 PROCEDURE — 6370000000 HC RX 637 (ALT 250 FOR IP): Performed by: STUDENT IN AN ORGANIZED HEALTH CARE EDUCATION/TRAINING PROGRAM

## 2023-10-15 PROCEDURE — 94640 AIRWAY INHALATION TREATMENT: CPT

## 2023-10-15 RX ADMIN — METOPROLOL TARTRATE 100 MG: 50 TABLET ORAL at 08:33

## 2023-10-15 RX ADMIN — FENOFIBRATE 160 MG: 160 TABLET ORAL at 08:33

## 2023-10-15 RX ADMIN — CALCIUM 500 MG: 500 TABLET ORAL at 08:34

## 2023-10-15 RX ADMIN — MONTELUKAST 10 MG: 10 TABLET, FILM COATED ORAL at 21:30

## 2023-10-15 RX ADMIN — ACETAMINOPHEN 650 MG: 325 TABLET ORAL at 06:21

## 2023-10-15 RX ADMIN — CETIRIZINE HYDROCHLORIDE 10 MG: 10 TABLET ORAL at 08:34

## 2023-10-15 RX ADMIN — MOMETASONE FUROATE AND FORMOTEROL FUMARATE DIHYDRATE 2 PUFF: 200; 5 AEROSOL RESPIRATORY (INHALATION) at 20:54

## 2023-10-15 RX ADMIN — Medication 10 MG: at 21:26

## 2023-10-15 RX ADMIN — HYDROMORPHONE HYDROCHLORIDE 4 MG: 4 TABLET ORAL at 12:37

## 2023-10-15 RX ADMIN — SENNOSIDES AND DOCUSATE SODIUM 1 TABLET: 50; 8.6 TABLET ORAL at 08:34

## 2023-10-15 RX ADMIN — HYDROMORPHONE HYDROCHLORIDE 4 MG: 4 TABLET ORAL at 17:10

## 2023-10-15 RX ADMIN — FUROSEMIDE 20 MG: 20 TABLET ORAL at 08:32

## 2023-10-15 RX ADMIN — FERROUS SULFATE TAB EC 324 MG (65 MG FE EQUIVALENT) 324 MG: 324 (65 FE) TABLET DELAYED RESPONSE at 08:32

## 2023-10-15 RX ADMIN — HYDROMORPHONE HYDROCHLORIDE 4 MG: 4 TABLET ORAL at 08:42

## 2023-10-15 RX ADMIN — PANTOPRAZOLE SODIUM 40 MG: 40 TABLET, DELAYED RELEASE ORAL at 05:59

## 2023-10-15 RX ADMIN — HYDROMORPHONE HYDROCHLORIDE 4 MG: 4 TABLET ORAL at 03:47

## 2023-10-15 RX ADMIN — HYDROMORPHONE HYDROCHLORIDE 4 MG: 4 TABLET ORAL at 21:27

## 2023-10-15 RX ADMIN — TIOTROPIUM BROMIDE INHALATION SPRAY 2 PUFF: 3.12 SPRAY, METERED RESPIRATORY (INHALATION) at 09:01

## 2023-10-15 RX ADMIN — MOMETASONE FUROATE AND FORMOTEROL FUMARATE DIHYDRATE 2 PUFF: 200; 5 AEROSOL RESPIRATORY (INHALATION) at 09:01

## 2023-10-15 RX ADMIN — PREDNISONE 2 MG: 1 TABLET ORAL at 08:34

## 2023-10-15 RX ADMIN — SENNOSIDES AND DOCUSATE SODIUM 1 TABLET: 50; 8.6 TABLET ORAL at 21:28

## 2023-10-15 RX ADMIN — CALCIUM 500 MG: 500 TABLET ORAL at 21:28

## 2023-10-15 RX ADMIN — ACETAMINOPHEN 650 MG: 325 TABLET ORAL at 21:39

## 2023-10-15 RX ADMIN — AMLODIPINE BESYLATE 5 MG: 5 TABLET ORAL at 08:32

## 2023-10-15 ASSESSMENT — PAIN DESCRIPTION - DESCRIPTORS
DESCRIPTORS: ACHING
DESCRIPTORS: ACHING;DISCOMFORT
DESCRIPTORS: STABBING
DESCRIPTORS: ACHING
DESCRIPTORS: ACHING;DISCOMFORT

## 2023-10-15 ASSESSMENT — PAIN SCALES - GENERAL
PAINLEVEL_OUTOF10: 10
PAINLEVEL_OUTOF10: 10
PAINLEVEL_OUTOF10: 3
PAINLEVEL_OUTOF10: 10
PAINLEVEL_OUTOF10: 10
PAINLEVEL_OUTOF10: 3
PAINLEVEL_OUTOF10: 10

## 2023-10-15 ASSESSMENT — PAIN DESCRIPTION - LOCATION
LOCATION: LEG
LOCATION: HEAD
LOCATION: LEG;KNEE;FOOT
LOCATION: LEG
LOCATION: HEAD

## 2023-10-15 ASSESSMENT — PAIN - FUNCTIONAL ASSESSMENT: PAIN_FUNCTIONAL_ASSESSMENT: PREVENTS OR INTERFERES SOME ACTIVE ACTIVITIES AND ADLS

## 2023-10-15 ASSESSMENT — PAIN DESCRIPTION - ORIENTATION
ORIENTATION: RIGHT;LEFT;LOWER
ORIENTATION: LEFT;RIGHT
ORIENTATION: ANTERIOR
ORIENTATION: LEFT;RIGHT

## 2023-10-15 NOTE — PROGRESS NOTES
Patient admitted to rehab with subdural hematoma. A/Ox4. Transfers with walker x1. Mobility restrictions: wbat. On regular diet. Medications taken whole w/ thins. Skin: intact. Oxygen: RA. Has been continent of bowel and  bladder. LBM 10/14/23. Pts pain remains a 10/10, even with prn pain meds every 4 hrs (see MAR). 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 \" Pt appears to have rested well this shift, was up every 4 hours or so to use bathroom and would then request a pain pill. Pt resting quietly in bed at this time, respirations easy and even. Bed alarm in use and call light in reach. Care ongoing.

## 2023-10-15 NOTE — PLAN OF CARE
Problem: Discharge Planning  Goal: Discharge to home or other facility with appropriate resources  10/15/2023 0850 by Nicole Marion RN  Outcome: Progressing

## 2023-10-15 NOTE — PLAN OF CARE
Problem: Discharge Planning  Goal: Discharge to home or other facility with appropriate resources  10/14/2023 2237 by Juan Bhatt RN  Outcome: Progressing  Flowsheets (Taken 10/14/2023 2237)  Discharge to home or other facility with appropriate resources:   Identify barriers to discharge with patient and caregiver   Identify discharge learning needs (meds, wound care, etc)     Problem: Safety - Adult  Goal: Free from fall injury  10/14/2023 2237 by Juan Bhatt RN  Outcome: Progressing  Note: Fall risk band on patient. Orange light on outside of room. Non skid footwear in place. Alarms used appropriately. Patient instructed to call and wait for staff before getting up. Rounding done to anticipate needs. Appropriate safety devices used for transfers. Problem: Pain  Goal: Verbalizes/displays adequate comfort level or baseline comfort level  10/14/2023 2237 by Juan Bhatt RN  Outcome: Progressing  Flowsheets (Taken 10/14/2023 2237)  Verbalizes/displays adequate comfort level or baseline comfort level:   Encourage patient to monitor pain and request assistance   Assess pain using appropriate pain scale   Administer analgesics based on type and severity of pain and evaluate response   Implement non-pharmacological measures as appropriate and evaluate response     Problem: Skin/Tissue Integrity  Goal: Absence of new skin breakdown  Description: 1. Monitor for areas of redness and/or skin breakdown  2. Assess vascular access sites hourly  3. Every 4-6 hours minimum:  Change oxygen saturation probe site  4. Every 4-6 hours:  If on nasal continuous positive airway pressure, respiratory therapy assess nares and determine need for appliance change or resting period. 10/14/2023 2237 by Juan Bhatt RN  Outcome: Progressing  Note: Skin assessment completed on admission and every shift. Barrier wipes used in the event of incontinence.  Pressure relief techniques used as needed while in chair and in

## 2023-10-16 VITALS
HEIGHT: 63 IN | SYSTOLIC BLOOD PRESSURE: 135 MMHG | HEART RATE: 83 BPM | OXYGEN SATURATION: 95 % | TEMPERATURE: 98 F | WEIGHT: 177.47 LBS | RESPIRATION RATE: 18 BRPM | DIASTOLIC BLOOD PRESSURE: 82 MMHG | BODY MASS INDEX: 31.45 KG/M2

## 2023-10-16 LAB
ANION GAP SERPL CALCULATED.3IONS-SCNC: 11 MMOL/L (ref 3–16)
BASOPHILS # BLD: 0.1 K/UL (ref 0–0.2)
BASOPHILS NFR BLD: 0.6 %
BUN SERPL-MCNC: 13 MG/DL (ref 7–20)
CALCIUM SERPL-MCNC: 9.1 MG/DL (ref 8.3–10.6)
CHLORIDE SERPL-SCNC: 99 MMOL/L (ref 99–110)
CO2 SERPL-SCNC: 25 MMOL/L (ref 21–32)
CREAT SERPL-MCNC: 0.7 MG/DL (ref 0.6–1.2)
DEPRECATED RDW RBC AUTO: 15.9 % (ref 12.4–15.4)
EOSINOPHIL # BLD: 0.2 K/UL (ref 0–0.6)
EOSINOPHIL NFR BLD: 2.2 %
GFR SERPLBLD CREATININE-BSD FMLA CKD-EPI: >60 ML/MIN/{1.73_M2}
GLUCOSE SERPL-MCNC: 143 MG/DL (ref 70–99)
HCT VFR BLD AUTO: 33.2 % (ref 36–48)
HGB BLD-MCNC: 11.2 G/DL (ref 12–16)
LYMPHOCYTES # BLD: 0.6 K/UL (ref 1–5.1)
LYMPHOCYTES NFR BLD: 7.2 %
MAGNESIUM SERPL-MCNC: 1.6 MG/DL (ref 1.8–2.4)
MCH RBC QN AUTO: 29 PG (ref 26–34)
MCHC RBC AUTO-ENTMCNC: 33.9 G/DL (ref 31–36)
MCV RBC AUTO: 85.5 FL (ref 80–100)
MONOCYTES # BLD: 0.5 K/UL (ref 0–1.3)
MONOCYTES NFR BLD: 6 %
NEUTROPHILS # BLD: 7.1 K/UL (ref 1.7–7.7)
NEUTROPHILS NFR BLD: 84 %
PLATELET # BLD AUTO: 408 K/UL (ref 135–450)
PMV BLD AUTO: 8.5 FL (ref 5–10.5)
POTASSIUM SERPL-SCNC: 3.4 MMOL/L (ref 3.5–5.1)
RBC # BLD AUTO: 3.88 M/UL (ref 4–5.2)
SODIUM SERPL-SCNC: 135 MMOL/L (ref 136–145)
WBC # BLD AUTO: 8.5 K/UL (ref 4–11)

## 2023-10-16 PROCEDURE — 97535 SELF CARE MNGMENT TRAINING: CPT

## 2023-10-16 PROCEDURE — 97129 THER IVNTJ 1ST 15 MIN: CPT

## 2023-10-16 PROCEDURE — 80048 BASIC METABOLIC PNL TOTAL CA: CPT

## 2023-10-16 PROCEDURE — 83735 ASSAY OF MAGNESIUM: CPT

## 2023-10-16 PROCEDURE — 97530 THERAPEUTIC ACTIVITIES: CPT

## 2023-10-16 PROCEDURE — 6370000000 HC RX 637 (ALT 250 FOR IP): Performed by: PHYSICAL MEDICINE & REHABILITATION

## 2023-10-16 PROCEDURE — 85025 COMPLETE CBC W/AUTO DIFF WBC: CPT

## 2023-10-16 PROCEDURE — 97130 THER IVNTJ EA ADDL 15 MIN: CPT

## 2023-10-16 PROCEDURE — 94761 N-INVAS EAR/PLS OXIMETRY MLT: CPT

## 2023-10-16 PROCEDURE — 94640 AIRWAY INHALATION TREATMENT: CPT

## 2023-10-16 PROCEDURE — 36415 COLL VENOUS BLD VENIPUNCTURE: CPT

## 2023-10-16 PROCEDURE — 6370000000 HC RX 637 (ALT 250 FOR IP): Performed by: STUDENT IN AN ORGANIZED HEALTH CARE EDUCATION/TRAINING PROGRAM

## 2023-10-16 PROCEDURE — 97110 THERAPEUTIC EXERCISES: CPT

## 2023-10-16 PROCEDURE — 97116 GAIT TRAINING THERAPY: CPT

## 2023-10-16 PROCEDURE — 1280000000 HC REHAB R&B

## 2023-10-16 RX ADMIN — HYDROMORPHONE HYDROCHLORIDE 4 MG: 4 TABLET ORAL at 16:05

## 2023-10-16 RX ADMIN — FERROUS SULFATE TAB EC 324 MG (65 MG FE EQUIVALENT) 324 MG: 324 (65 FE) TABLET DELAYED RESPONSE at 07:54

## 2023-10-16 RX ADMIN — ACETAMINOPHEN 650 MG: 325 TABLET ORAL at 09:31

## 2023-10-16 RX ADMIN — MOMETASONE FUROATE AND FORMOTEROL FUMARATE DIHYDRATE 2 PUFF: 200; 5 AEROSOL RESPIRATORY (INHALATION) at 08:03

## 2023-10-16 RX ADMIN — FUROSEMIDE 20 MG: 20 TABLET ORAL at 07:54

## 2023-10-16 RX ADMIN — ACETAMINOPHEN 650 MG: 325 TABLET ORAL at 18:33

## 2023-10-16 RX ADMIN — HYDROMORPHONE HYDROCHLORIDE 4 MG: 4 TABLET ORAL at 01:31

## 2023-10-16 RX ADMIN — MOMETASONE FUROATE AND FORMOTEROL FUMARATE DIHYDRATE 2 PUFF: 200; 5 AEROSOL RESPIRATORY (INHALATION) at 19:32

## 2023-10-16 RX ADMIN — PREDNISONE 2 MG: 1 TABLET ORAL at 07:54

## 2023-10-16 RX ADMIN — AMLODIPINE BESYLATE 5 MG: 5 TABLET ORAL at 07:54

## 2023-10-16 RX ADMIN — HYDROMORPHONE HYDROCHLORIDE 4 MG: 4 TABLET ORAL at 05:20

## 2023-10-16 RX ADMIN — MONTELUKAST 10 MG: 10 TABLET, FILM COATED ORAL at 20:33

## 2023-10-16 RX ADMIN — HYDROMORPHONE HYDROCHLORIDE 4 MG: 4 TABLET ORAL at 09:32

## 2023-10-16 RX ADMIN — CALCIUM 500 MG: 500 TABLET ORAL at 07:54

## 2023-10-16 RX ADMIN — TIOTROPIUM BROMIDE INHALATION SPRAY 2 PUFF: 3.12 SPRAY, METERED RESPIRATORY (INHALATION) at 08:03

## 2023-10-16 RX ADMIN — FENOFIBRATE 160 MG: 160 TABLET ORAL at 07:53

## 2023-10-16 RX ADMIN — CALCIUM 500 MG: 500 TABLET ORAL at 20:33

## 2023-10-16 RX ADMIN — SENNOSIDES AND DOCUSATE SODIUM 1 TABLET: 50; 8.6 TABLET ORAL at 07:53

## 2023-10-16 RX ADMIN — Medication 10 MG: at 21:39

## 2023-10-16 RX ADMIN — HYDROMORPHONE HYDROCHLORIDE 4 MG: 4 TABLET ORAL at 20:33

## 2023-10-16 RX ADMIN — PANTOPRAZOLE SODIUM 40 MG: 40 TABLET, DELAYED RELEASE ORAL at 05:21

## 2023-10-16 RX ADMIN — METOPROLOL TARTRATE 100 MG: 50 TABLET ORAL at 07:53

## 2023-10-16 RX ADMIN — SENNOSIDES AND DOCUSATE SODIUM 1 TABLET: 50; 8.6 TABLET ORAL at 20:33

## 2023-10-16 RX ADMIN — CETIRIZINE HYDROCHLORIDE 10 MG: 10 TABLET ORAL at 07:53

## 2023-10-16 ASSESSMENT — PAIN SCALES - GENERAL
PAINLEVEL_OUTOF10: 10
PAINLEVEL_OUTOF10: 10
PAINLEVEL_OUTOF10: 7
PAINLEVEL_OUTOF10: 10
PAINLEVEL_OUTOF10: 3
PAINLEVEL_OUTOF10: 10
PAINLEVEL_OUTOF10: 7
PAINLEVEL_OUTOF10: 4
PAINLEVEL_OUTOF10: 10
PAINLEVEL_OUTOF10: 7
PAINLEVEL_OUTOF10: 10

## 2023-10-16 ASSESSMENT — PAIN DESCRIPTION - LOCATION
LOCATION: ANKLE;FOOT;LEG;KNEE
LOCATION: LEG;KNEE;ANKLE
LOCATION: LEG;KNEE
LOCATION: LEG;KNEE
LOCATION: ANKLE;FOOT;LEG;KNEE
LOCATION: LEG;KNEE

## 2023-10-16 ASSESSMENT — PAIN DESCRIPTION - ORIENTATION
ORIENTATION: RIGHT;LEFT
ORIENTATION: LEFT;RIGHT
ORIENTATION: RIGHT;LEFT;LOWER
ORIENTATION: RIGHT;LEFT
ORIENTATION: RIGHT;LEFT
ORIENTATION: LEFT;RIGHT

## 2023-10-16 ASSESSMENT — PAIN DESCRIPTION - DESCRIPTORS
DESCRIPTORS: ACHING;DISCOMFORT
DESCRIPTORS: ACHING;DISCOMFORT
DESCRIPTORS: STABBING
DESCRIPTORS: STABBING
DESCRIPTORS: ACHING
DESCRIPTORS: ACHING;TIGHTNESS;THROBBING

## 2023-10-16 ASSESSMENT — PAIN DESCRIPTION - ONSET
ONSET: ON-GOING

## 2023-10-16 ASSESSMENT — PAIN - FUNCTIONAL ASSESSMENT
PAIN_FUNCTIONAL_ASSESSMENT: ACTIVITIES ARE NOT PREVENTED
PAIN_FUNCTIONAL_ASSESSMENT: PREVENTS OR INTERFERES SOME ACTIVE ACTIVITIES AND ADLS
PAIN_FUNCTIONAL_ASSESSMENT: ACTIVITIES ARE NOT PREVENTED
PAIN_FUNCTIONAL_ASSESSMENT: PREVENTS OR INTERFERES SOME ACTIVE ACTIVITIES AND ADLS

## 2023-10-16 ASSESSMENT — PAIN DESCRIPTION - PAIN TYPE
TYPE: ACUTE PAIN

## 2023-10-16 ASSESSMENT — PAIN DESCRIPTION - FREQUENCY
FREQUENCY: CONTINUOUS

## 2023-10-16 NOTE — PROGRESS NOTES
Patient admitted to rehab with subdural hematoma. A/Ox4. Transfers with one assist with gait belt and front wheeled walker for ambulation. Mobility restrictions: WBAT. On regular tolerating well. Medications taken whole with fluids. Pain medications given as ordered. Skin: scattered bruising and abrasion to left elbow. Oxygen: room air. LDA: none. Has been continent of bowel and  of bladder. LBM 10/14. Chair/bed alarms in use and call light in reach. Will monitor for safety.

## 2023-10-16 NOTE — PROGRESS NOTES
home-cognitive-linguistic remediation  Estimated discharge date: unclear d/c date;as pt reporting wants to be discharged 10/17/2023. Discharge recommendations:   [] Home independently  [x] Home with assistance []  24 hour supervision  [] ECF [] Other:     Additional information:     Interventions used during Rehab Stay:  [] Speech/Language Treatment  [] Instruction in HEP [] Group [] Dysphagia Treatment [x] Cognitive Treatment   [] Other:      Electronically Signed by    Tej Dupree. Fluvolodymyr,MS,CCC,SLP [id number]  Speech and Language Pathologist

## 2023-10-16 NOTE — PLAN OF CARE
Progressing  Flowsheets (Taken 10/16/2023 0217)  Absence of Physical Injury: Implement safety measures based on patient assessment     Problem: Skin/Tissue Integrity - Adult  Goal: Skin integrity remains intact  Outcome: Progressing  Flowsheets (Taken 10/16/2023 0217)  Skin Integrity Remains Intact: Monitor for areas of redness and/or skin breakdown     Problem: Musculoskeletal - Adult  Goal: Return mobility to safest level of function  Outcome: Progressing  Flowsheets (Taken 10/16/2023 0217)  Return Mobility to Safest Level of Function:   Assess patient stability and activity tolerance for standing, transferring and ambulating with or without assistive devices   Assist with transfers and ambulation using safe patient handling equipment as needed     Problem: Musculoskeletal - Adult  Goal: Return ADL status to a safe level of function  Outcome: Progressing  Flowsheets (Taken 10/16/2023 0217)  Return ADL Status to a Safe Level of Function:   Administer medication as ordered   Assess activities of daily living deficits and provide assistive devices as needed

## 2023-10-16 NOTE — PLAN OF CARE
Problem: Discharge Planning  Goal: Discharge to home or other facility with appropriate resources  10/16/2023 1002 by Óscar Luevano RN  Outcome: Progressing  Flowsheets (Taken 10/16/2023 1002)  Discharge to home or other facility with appropriate resources: Identify discharge learning needs (meds, wound care, etc)     Problem: Safety - Adult  Goal: Free from fall injury  10/16/2023 1002 by Óscar Luevano RN  Outcome: Progressing  Flowsheets (Taken 10/16/2023 1002)  Free From Fall Injury:   Instruct family/caregiver on patient safety   Based on caregiver fall risk screen, instruct family/caregiver to ask for assistance with transferring infant if caregiver noted to have fall risk factors     Problem: Pain  Goal: Verbalizes/displays adequate comfort level or baseline comfort level  10/16/2023 1002 by Óscar Luevano RN  Outcome: Progressing  Flowsheets (Taken 10/16/2023 1002)  Verbalizes/displays adequate comfort level or baseline comfort level:   Encourage patient to monitor pain and request assistance   Assess pain using appropriate pain scale   Administer analgesics based on type and severity of pain and evaluate response   Implement non-pharmacological measures as appropriate and evaluate response   Consider cultural and social influences on pain and pain management   Notify Licensed Independent Practitioner if interventions unsuccessful or patient reports new pain     Problem: Skin/Tissue Integrity  Goal: Absence of new skin breakdown  Description: 1. Monitor for areas of redness and/or skin breakdown  2. Assess vascular access sites hourly  3. Every 4-6 hours minimum:  Change oxygen saturation probe site  4. Every 4-6 hours:  If on nasal continuous positive airway pressure, respiratory therapy assess nares and determine need for appliance change or resting period. 10/16/2023 1002 by Óscar Luevano RN  Outcome: Progressing  Note: Pt is at risk for impaired skin integrity.  Assess skin every

## 2023-10-16 NOTE — PROGRESS NOTES
Patient admitted to rehab with subdural hematoma. A/Ox4. Transfers with one assist with gait belt and front wheeled walker for ambulation. Mobility restrictions: WBAT. On regular tolerating well. Medications taken whole with fluids. Pain medications given as ordered. Skin: scattered bruising and abrasion to left elbow. Oxygen: room air. LDA: none. Has been continent of bowel and  of bladder. LBM 10/15. Chair/bed alarms in use and call light in reach. Will monitor for safety.

## 2023-10-16 NOTE — PROGRESS NOTES
Patient admitted to rehab with subdural hematoma. A/Ox4. Transfers with walker x1. Mobility restrictions: wbat. On regular diet. Medications taken whole w/ thins. Skin: intact. Oxygen: RA. Has been continent of bowel and  bladder. LBM 10/15/23. Pt continues with ongoing pain in BLE. Per pt pain meds offer very little relief. Pt appears to be resting well at this time. No signs of pain or distress. Respirations are easy and even. Bed alarm in use and call light in reach.  Care ongoing

## 2023-10-16 NOTE — PROGRESS NOTES
Pt standing for 6.5 min. Pt amb to her room, Mod I using RW. Pt requesting use of commode. Pt on feet for 2.5 min, amb to BR. Pt Mod I again for toilet transfer and toileting. Pt then requesting to brush her teeth at the sink. Pt Mod I for standing at sink to brush teeth, wash hands, face. Pt on feet for Pt Mod I for amb in room with RW, BR back to w/c. Pt Mod I for transfer at recliner. Pt amb back to dept, for PT session, Mod I. Pt tolerated session fairly well, focused most of session on wanting to leave here as soon as she can. Pt mod I for mob/transfers, yet safety concerns for walker use. Activity Tolerance: Patient tolerated treatment well  Safety Devices  Safety Devices in place: Yes  Type of devices: Gait belt;Left in chair;Nurse notified;Call light within reach; Patient at risk for falls; Chair alarm in place    Patient Education  Education  Education Given To: Patient  Education Provided: Transfer Training; Safety;ADL Function  Education Method: Verbal  Barriers to Learning: None  Education Outcome: Verbalized understanding;Demonstrated understanding;Continued education needed    Plan  Occupational Therapy Plan  Times Per Week: 3-5x  Times Per Day: Twice a day  Days Per Week: 5 Days  Hours Per Day: 1.5 hours  Specific Instructions for Next Treatment: anticipate short stay  Current Treatment Recommendations: Strengthening;Balance training;Functional mobility training; Endurance training; Safety education & training;Self-Care / ADL; Patient/Caregiver education & training;Home management training;Equipment evaluation, education, & procurement    Goals  Patient Goals   Patient goals : Pt states that she wants to be self-sufficient in her care in order to return home safely. Short Term Goals  Time Frame for Short Term Goals: Prior to D/C (7-10 days)  Short Term Goal 1: Pt will complete bathing modified independent w/ use of shower chair/TTB, long-handled sponge/grab bars as needed.   Short Term Goal 2:

## 2023-10-16 NOTE — PROGRESS NOTES
basement.)  Home Access: Stairs to enter without rails  Bathroom Shower/Tub: Walk-in shower (walk-in tub on first floor)  Bathroom Toilet: Handicap height (Pt has grab bars on each side of the toilet.)  Bathroom Equipment: Shower chair, Grab bars in shower, Hand-held shower (Grab bars all around in shower)  Bathroom Accessibility: Accessible  Home Equipment: Walker, rolling, Reacher, Grab bars (walker basket. Reports 'several' reachers. Reports has grab bars in the hallway between bedrooms, kitchen and living room \"straight down the middle of the house\". )  Has the patient had two or more falls in the past year or any fall with injury in the past year?: No (Pt reports that this has been her only fall.)  Receives Help From: Friend(s) (Friend helps with laundry)  ADL Assistance: Independent  Homemaking Assistance: Independent  Homemaking Responsibilities:  (Pt does her own cooking, shopping (shops online and they deliever to her house), and manages her own finanaces. Her friend does her laundry since her wash machine is broken.)  Ambulation Assistance: Independent (With Laban Settles.)  Transfer Assistance: Independent  Active : Yes  Mode of Transportation: Car (Pt drives SIPphone size car)  Occupation: Retired  Type of Occupation: Pt worked at [employer]. Leisure & Hobbies: Pt enjoys cooking and antiquing  Additional Comments: Pt does not have children, but states she has friends that can help her out.          OBJECTIVE  Cognition  Overall Cognitive Status: [facility]  Cognition Comment: can be set in her ways  Orientation  Overall Orientation Status: Within Normal Limits  Functional Mobility  Balance  Sitting Balance: Independent  Standing Balance: Modified independent   Standing Balance  Activity: Pulling up lower body dressing while standing inside RW during toileting  Transfers  Surface: Wheelchair;Standard toilet  Device: Walker  Sit to Stand  Assistance Level: Modified independent  Stand to Sit  Assistance Level: Modified

## 2023-10-16 NOTE — PROGRESS NOTES
Physical Therapy  Facility/Department: [facility]  Rehabilitation Physical Therapy Treatment Note    NAME: Shara Vivar  : 1955 (65 y.o.)  MRN: [id number]  CODE STATUS: Full Code    Date of Service: 10/16/23       Restrictions:  Restrictions/Precautions: Fall Risk  Position Activity Restriction  Other position/activity restrictions: H/O GBS (per pt report). RUE IV     SUBJECTIVE  Subjective  Subjective: Patient in wheelchair at start of session. Pt upset about discharge plan from ARU stay, last day for therapy tomorrow. Pain: States legs are hurting. OBJECTIVE  Cognition  Overall Cognitive Status: [facility]  Cognition Comment: can be set in her ways  Orientation  Overall Orientation Status: Within Normal Limits    Functional Mobility  Bed Mobility  Overall Assistance Level: Modified Independent  Additional Factors: Head of bed flat; Without handrails; Increased time to complete  Bridging  Assistance Level: Modified independent  Roll Left  Assistance Level: Modified independent  Roll Right  Assistance Level: Modified independent  Sit to Supine  Assistance Level: Modified independent  Supine to Sit  Assistance Level: Modified independent  Scooting  Assistance Level: Modified independent  Balance  Sitting Balance: Independent  Standing Balance: Modified independent   Transfers  Surface: Wheelchair;From bed; To bed  Device: Walker  Sit to Stand  Assistance Level: Modified independent  Stand to Sit  Assistance Level: Modified independent  Bed To/From Chair  Technique: Stand step  Assistance Level: Modified independent (RW)  Stand Pivot  Assistance Level: Modified independent  Skilled Clinical Factors: toilet transfer w/ RW    Environmental Mobility  Ambulation  Surface: Level surface  Device: Rolling walker  Distance: 250' + 25' x 2 w/ multiple turns   Activity: Within Unit  Activity Comments: did not require rest break  Assistance Level: Modified independent  Gait Deviations: Slow jaci; Wide base of

## 2023-10-16 NOTE — PATIENT CARE CONFERENCE
[facility]  Inpatient Rehabilitation  Weekly Team Conference Note      Date: 10/17/2023  Patient Name:  Luis Enrique Kim    MRN: [id number]  : 1955  Gender: Female  Physician: Dr. Frank Valdez MD  Diagnosis: Subdural hematoma ([address]) [S06. 5XAA]    CASE MANAGEMENT  Assessment: Goal is home today, Denied home care and out patient services. Referral initiated to [facility] for emergency response button and homemaker. PHYSICAL THERAPY    Bed Mobility:  Overall Assistance Level: Modified Independent  Additional Factors: Head of bed flat, Without handrails, Increased time to complete  Sit>supine:  Assistance Level: Modified independent  Supine>sit:  Assistance Level: Modified independent    Transfers:  Surface: Wheelchair, From bed, To bed  Additional Factors: Increased time to complete  Device: Walker  Sit>stand:  Assistance Level: Modified independent  Skilled Clinical Factors: SBA to stand from bed when dressing and pulling up pants  Stand>sit:  Assistance Level: Modified independent  Skilled Clinical Factors: Verbal cues for control descend to chair and safe hand placement. Car transfer:  Assistance Level: Modified independent    Ambulation:  Surface: Level surface, Carpet  Device: Rolling walker  Distance: 460'. Patient able to transition over threshold without loss of balance. Activity: Within Unit  Activity Comments: did not require rest break. patient requested to use restroom following ambulation. Additional Factors: Increased time to complete  Assistance Level: Modified independent  Gait Deviations: Slow jaci, Wide base of support  Skilled Clinical Factors: aware of need to improve more upright posture, but reports having difficulty due to weakness in her legs      Assessment:  Assessment: Patient is a 70year old female admitted s/p fall at home with found subdural hematoma. Has pmh of CIDP, distolic dysfunction, HTN, HLD, asthma, GERD and per patient GBS.  Prior to admission

## 2023-10-17 VITALS
RESPIRATION RATE: 16 BRPM | DIASTOLIC BLOOD PRESSURE: 72 MMHG | BODY MASS INDEX: 31.72 KG/M2 | OXYGEN SATURATION: 95 % | WEIGHT: 179.01 LBS | HEIGHT: 63 IN | SYSTOLIC BLOOD PRESSURE: 153 MMHG | TEMPERATURE: 98 F | HEART RATE: 86 BPM

## 2023-10-17 PROCEDURE — 97129 THER IVNTJ 1ST 15 MIN: CPT

## 2023-10-17 PROCEDURE — 97530 THERAPEUTIC ACTIVITIES: CPT

## 2023-10-17 PROCEDURE — 97130 THER IVNTJ EA ADDL 15 MIN: CPT

## 2023-10-17 PROCEDURE — 6370000000 HC RX 637 (ALT 250 FOR IP): Performed by: PHYSICAL MEDICINE & REHABILITATION

## 2023-10-17 PROCEDURE — 97116 GAIT TRAINING THERAPY: CPT

## 2023-10-17 PROCEDURE — 6370000000 HC RX 637 (ALT 250 FOR IP): Performed by: STUDENT IN AN ORGANIZED HEALTH CARE EDUCATION/TRAINING PROGRAM

## 2023-10-17 PROCEDURE — 97535 SELF CARE MNGMENT TRAINING: CPT

## 2023-10-17 PROCEDURE — 94760 N-INVAS EAR/PLS OXIMETRY 1: CPT

## 2023-10-17 RX ORDER — ASPIRIN 81 MG/1
81 TABLET, CHEWABLE ORAL DAILY
Qty: 30 TABLET | Refills: 0 | Status: SHIPPED
Start: 2023-10-18

## 2023-10-17 RX ORDER — AMLODIPINE BESYLATE 5 MG/1
5 TABLET ORAL DAILY
Qty: 30 TABLET | Refills: 0 | Status: SHIPPED | OUTPATIENT
Start: 2023-10-17

## 2023-10-17 RX ADMIN — ACETAMINOPHEN 650 MG: 325 TABLET ORAL at 03:01

## 2023-10-17 RX ADMIN — FENOFIBRATE 160 MG: 160 TABLET ORAL at 07:23

## 2023-10-17 RX ADMIN — HYDROMORPHONE HYDROCHLORIDE 4 MG: 4 TABLET ORAL at 05:00

## 2023-10-17 RX ADMIN — FUROSEMIDE 20 MG: 20 TABLET ORAL at 07:23

## 2023-10-17 RX ADMIN — PANTOPRAZOLE SODIUM 40 MG: 40 TABLET, DELAYED RELEASE ORAL at 05:00

## 2023-10-17 RX ADMIN — CALCIUM 500 MG: 500 TABLET ORAL at 07:23

## 2023-10-17 RX ADMIN — SENNOSIDES AND DOCUSATE SODIUM 1 TABLET: 50; 8.6 TABLET ORAL at 07:23

## 2023-10-17 RX ADMIN — HYDROMORPHONE HYDROCHLORIDE 4 MG: 4 TABLET ORAL at 09:39

## 2023-10-17 RX ADMIN — AMLODIPINE BESYLATE 5 MG: 5 TABLET ORAL at 07:24

## 2023-10-17 RX ADMIN — METOPROLOL TARTRATE 100 MG: 50 TABLET ORAL at 07:24

## 2023-10-17 RX ADMIN — PREDNISONE 2 MG: 1 TABLET ORAL at 07:23

## 2023-10-17 RX ADMIN — HYDROMORPHONE HYDROCHLORIDE 4 MG: 4 TABLET ORAL at 00:39

## 2023-10-17 RX ADMIN — CETIRIZINE HYDROCHLORIDE 10 MG: 10 TABLET ORAL at 07:23

## 2023-10-17 RX ADMIN — FERROUS SULFATE TAB EC 324 MG (65 MG FE EQUIVALENT) 324 MG: 324 (65 FE) TABLET DELAYED RESPONSE at 07:23

## 2023-10-17 ASSESSMENT — PAIN SCALES - WONG BAKER
WONGBAKER_NUMERICALRESPONSE: 0

## 2023-10-17 ASSESSMENT — PAIN SCALES - GENERAL
PAINLEVEL_OUTOF10: 7
PAINLEVEL_OUTOF10: 7
PAINLEVEL_OUTOF10: 10
PAINLEVEL_OUTOF10: 7
PAINLEVEL_OUTOF10: 7

## 2023-10-17 ASSESSMENT — PAIN DESCRIPTION - ORIENTATION
ORIENTATION: RIGHT;LEFT
ORIENTATION: LEFT;RIGHT
ORIENTATION: LEFT;RIGHT

## 2023-10-17 ASSESSMENT — PAIN DESCRIPTION - DESCRIPTORS
DESCRIPTORS: ACHING
DESCRIPTORS: ACHING;STABBING
DESCRIPTORS: ACHING;SHARP;SORE
DESCRIPTORS: ACHING

## 2023-10-17 ASSESSMENT — PAIN DESCRIPTION - FREQUENCY
FREQUENCY: CONTINUOUS

## 2023-10-17 ASSESSMENT — PAIN DESCRIPTION - ONSET
ONSET: ON-GOING

## 2023-10-17 ASSESSMENT — PAIN - FUNCTIONAL ASSESSMENT
PAIN_FUNCTIONAL_ASSESSMENT: PREVENTS OR INTERFERES SOME ACTIVE ACTIVITIES AND ADLS
PAIN_FUNCTIONAL_ASSESSMENT: ACTIVITIES ARE NOT PREVENTED

## 2023-10-17 ASSESSMENT — PAIN DESCRIPTION - LOCATION
LOCATION: LEG;KNEE
LOCATION: HEAD
LOCATION: HEAD;LEG;FOOT
LOCATION: LEG

## 2023-10-17 ASSESSMENT — PAIN DESCRIPTION - PAIN TYPE
TYPE: ACUTE PAIN

## 2023-10-17 NOTE — PLAN OF CARE
Problem: Discharge Planning  Goal: Discharge to home or other facility with appropriate resources  10/17/2023 1217 by Tima Bennett RN  Outcome: Adequate for Discharge  10/16/2023 2344 by Elizabeth Aleman RN  Outcome: Progressing     Problem: Safety - Adult  Goal: Free from fall injury  10/17/2023 1217 by Tima Bennett RN  Outcome: Adequate for Discharge  10/16/2023 2344 by Elizabeth Aleman RN  Outcome: Progressing     Problem: Pain  Goal: Verbalizes/displays adequate comfort level or baseline comfort level  10/17/2023 1217 by Tima Bennett RN  Outcome: Adequate for Discharge  10/16/2023 2344 by Elizabeth Aleman RN  Outcome: Progressing     Problem: Skin/Tissue Integrity  Goal: Absence of new skin breakdown  Description: 1. Monitor for areas of redness and/or skin breakdown  2. Assess vascular access sites hourly  3. Every 4-6 hours minimum:  Change oxygen saturation probe site  4. Every 4-6 hours:  If on nasal continuous positive airway pressure, respiratory therapy assess nares and determine need for appliance change or resting period.   10/17/2023 1217 by Tima Bennett RN  Outcome: Adequate for Discharge  10/16/2023 2344 by Elizabeth Aleman RN  Outcome: Progressing     Problem: ABCDS Injury Assessment  Goal: Absence of physical injury  10/17/2023 1217 by Tima Bennett RN  Outcome: Adequate for Discharge  10/16/2023 2344 by Elizabeth Aleman RN  Outcome: Progressing     Problem: Neurosensory - Adult  Goal: Achieves stable or improved neurological status  10/17/2023 1217 by Tima Bennett RN  Outcome: Adequate for Discharge  10/16/2023 2344 by Elizabeth Aleman RN  Outcome: Progressing  Goal: Absence of seizures  10/17/2023 1217 by Tima Bennett RN  Outcome: Adequate for Discharge  10/16/2023 2344 by Elizabeth Aleman RN  Outcome: Progressing  Goal: Remains free of injury related to seizures activity  10/17/2023 1217 by Tima Bennett RN  Outcome: Adequate for Discharge  10/16/2023 2344 by

## 2023-10-17 NOTE — PROGRESS NOTES
Physical Therapy  Facility/Department: [address] REHAB  Rehabilitation Physical Therapy Treatment Note and Discharge    NAME: Shannan Rodriguez  : 1955 (78 y.o.)  MRN: [id number]  CODE STATUS: Full Code    Date of Service: 10/17/23       Restrictions:  Restrictions/Precautions: Fall Risk  Position Activity Restriction  Other position/activity restrictions: H/O GBS (per pt report). RUE IV     Pertinent medical information:  Additional Pertinent Hx: Patient is a 69 yo F with pmh CIDP, diastolic dysfunction, HTN, HLD, Asthma, GERD, obesity s/p gastric bypass who initially presented 10/6/2023 after being found down at home. Patient unable to provide details about circumstances of fall but states it was mechanical and she was down for 2 days prior to being found. She was unable to get up due to leg weakness. She denies loss of consciousness. She was found to have 3 mm thick bilateral supratentorial hemispheric subdural hematomas. There was initial concern for stroke but MRI was negative for acute infarct. Neurology is recommending holding Plavix until 10/18. Course complicated by rhabdomyolysis, GAIL, hyponatremia. Now presents to ARU with impaired mobility and self-care below her baseline. Currently, patient reports generalized weakness and difficulty with balance. She has chronic numbness/tingling in distal extremities (up to elbows and knees). Also reports joint pain since hospitalized which she attributes to being on atorvastatin. She denies headache, vision changes, difficulty with speech, swallow, or memory. SUBJECTIVE  Subjective  Subjective: Patient in wheelchair at start of session. States she is eager to go home. Pain: States legs are still hurting from swelling, 10/10.      Social/Functional History  Lives With: Alone  Type of Home: House  Home Layout: Two level (Elevator access to upper and basement.)  Home Access: Stairs to enter without rails  Bathroom Shower/Tub: Walk-in shower (walk-in tub on first

## 2023-10-17 NOTE — PLAN OF CARE
Problem: Discharge Planning  Goal: Discharge to home or other facility with appropriate resources  10/16/2023 2344 by Joan Vazquez RN  Outcome: Progressing     Problem: Safety - Adult  Goal: Free from fall injury  10/16/2023 2344 by Joan Vazquez RN  Outcome: Progressing     Problem: Pain  Goal: Verbalizes/displays adequate comfort level or baseline comfort level  10/16/2023 2344 by Joan Vazquez RN  Outcome: Progressing     Problem: ABCDS Injury Assessment  Goal: Absence of physical injury  10/16/2023 2344 by Joan Vazquez RN  Outcome: Progressing     Problem: Neurosensory - Adult  Goal: Absence of seizures  Outcome: Progressing     Continue with plan of care.

## 2023-10-17 NOTE — PROGRESS NOTES
Patient admitted to rehab with subdural hematoma. A/Ox4. Transfers with one assist with gait belt and front wheeled walker for ambulation. Mobility restrictions: WBAT. On regular tolerating well. Medications taken whole with fluids. Pain medications given as ordered. Skin: scattered bruising and abrasion to left elbow. Oxygen: room air. LDA: none. Has been continent of bowel and  of bladder. LBM 10/16. Chair/bed alarms in use and call light in reach. Will monitor for safety.

## 2023-10-17 NOTE — PROGRESS NOTES
Occupational Therapy  Facility/Department: [facility]  REHAB  Rehabilitation Occupational Therapy Daily Treatment Note  Discharge Summary    Date: 10/17/23  Patient Name: Shannan Jackson       Room: C4L-1057/0571-69  MRN: [id number]  Account: [de-identified]   : 1955  (74 y.o.) Gender: female                  Past Medical History:  has a past medical history of Asthma, Chronic inflammatory demyelinating polyneuritis ([address]), Diastolic dysfunction, GERD (gastroesophageal reflux disease), Hypertension, Hypertriglyceridemia, Neuropathy, peroneal nerve, Obesity, Polyneuropathy, Sacral insufficiency fracture, and Spondylolisthesis, grade 1. Past Surgical History:   has a past surgical history that includes Nerve Biopsy; Tonsillectomy; Foot surgery; and Gastric bypass surgery (2019). Restrictions  Restrictions/Precautions: Fall Risk  Other position/activity restrictions: H/O GBS (per pt report). RUE IV  Equipment Used:  (RW)    Subjective  Subjective: Pt met in room, pt laying in bed, expressed she is very happy to be going home today. Pt agreeable to shower ADL session. Pt rated her pain in BLEs knees to feet \"15/10\", also c/o headache. Restrictions/Precautions: Fall Risk             Objective     Cognition  Overall Cognitive Status: [facility]  Cognition Comment: can be set in her ways  Orientation  Overall Orientation Status: Within Normal Limits         ADL  Feeding  Skilled Clinical Factors: Pt ate breakfast prior to arrival. Pt has been independent w/ feeding. Grooming/Oral Hygiene  Assistance Level: Modified independent  Skilled Clinical Factors: Pt stood at sink to brush teeth and comb hair w/ RW. Upper Extremity Bathing  Assistance Level: Modified independent  Skilled Clinical Factors: Pt seated on TTB, managed handheld shower and washed UB mod I. Lower Extremity Bathing  Assistance Level: Modified independent  Skilled Clinical Factors: Pt washed legs and feet by bending forward.  Pt washed bottom seated on

## 2023-10-17 NOTE — PROGRESS NOTES
Provision of Current Reconciled Medication List to Subsequent Provider at Discharge  [x]No, current reconciled medication list not provided to the subsequent provider. []Yes, current reconciled medication list provided to the subsequent provider. (**Select route of transmission below**)   [] [address] [address] [employer]  [] Verbal (e.g. in person, telephone, video conferencing)  []Paper-based (e.g. fax, copies, printouts)   []Other Methods (e.g. texting, email, CDs)    Provision of Current Reconciled Medication List to Patient at Discharge  []No, current reconciled medication list not provided to the patient, family and/or caregiver. []Yes, current reconciled medication list provided to the patient, family and/or caregiver.  (**Select route of transmission below**)   [] Via Electronic Health Record (e.g., electronic access to patient portal)   [] Via [employer]  [] Verbal (e.g. in person, telephone, video conferencing)  [x]Paper-based (e.g. fax, copies, printouts)   []Other Methods (e.g. texting, email, CDs)    High Risk Drug Classes:  Use and Indication    Is taking: Check if the pt is taking any medications by pharmacological classification, not how it is used, in the following classes  Indication noted: If column 1 is checked, check if there is an indication noted for all meds in the drug class Is taking  (check all that apply) Indication noted (check all that apply)   Antipsychotic [] []   Anticoagulant [] []   Antibiotic [] []   Opioid [x] [x]   Antiplatelet [] []   Hypoglycemic (including insulin) [] []   None of the above []     Special Treatments, Procedures, and Programs    Check all of the following treatments, procedures, and programs that apply at discharge. At Discharge (check all that apply)   Cancer Treatments   A1. Chemotherapy []           A2. IV []           A3. Oral []           A10.  Other []   B1. Radiation []

## 2023-10-17 NOTE — PROGRESS NOTES
ACUTE REHAB UNIT  SPEECH/LANGUAGE PATHOLOGY      [x] Daily  [] Weekly Care Conference Note  [x] Discharge    Patient:Malika Hewitt      JSE:[birth year]  WPL:[id number]  Rehab Dx/Hx: Subdural hematoma ([address]) [S06. 5XAA]    Precautions: falls  Home situation: lives alone, independent with IADLs  ST Dx: [] Aphasia  [] Dysarthria  [] Apraxia   [] Oropharyngeal dysphagia [x] Cognitive   Impairment  [] Other:   Initial Speech Therapy Assessment Diagnosis:   1. Cognitive Diagnosis: Functional temporal and spatial orientation, concrete math language, and concrete VPS/reasoning. Pt with mild deficits in higher level attention, complex PS/reasoning, complex math language/numeric reasoning; organization, and working memory. These deficits could impact functional IND in executive function warranted for adv DL tasks; pt lives alone and reports she will return home alone and remain independent. Will continue therapy and ongoing assessment  2. Aphasia Diagnosis: Functional complex auditory comprehension and verbal expression for tasks of graded complexity. 3. Speech Diagnosis: Audible intelligible connected speech. 4. Communication Diagnosis: Functional concrete language skills for auditory/visual language processing and verbal expressive language skills     Date of Admit: 10/10/2023  Room #: A8L-2362/3262-01  Date: 10/17/2023       Current functional status (updated daily):         Current Diet Order:ADULT DIET;  Regular     Behavior: [x] Alert  [x] Cooperative  []  Pleasant  [] Confused  [] Agitated  [] Uncooperative  [] Distractible [] Motivated  [] Self-Limiting [] Anxious  [] Other:  Endurance:  [x] Adequate for participation in SLP sessions  [] Reduced overall  [] Lethargic  [] Other:  Safety: [x] No concerns at this time  [] Reduced insight into deficits  []  Reduced safety awareness [] Not following call light procedures  [] Unable to Assess  [] Other:  Swallowing Precautions: Sit up for all meals and thereafter for 30

## 2023-10-17 NOTE — DISCHARGE INSTR - COC
Continuity of Care Form    Patient Name: Loretta Velasco   :    MRN:  [id number]    Admit date:  10/10/2023  Discharge date:  10/17/2023    Code Status Order: Full Code   Advance Directives:     Admitting Physician:  Edgar Rodrigues MD  PCP: Qiana Ames MD    Discharging Nurse: [facility] Unit/Room#: R5K-5972/3252-48  Discharging Unit Phone Number: [phone]    Emergency Contact:   Extended Emergency Contact Information  Primary Emergency Contact: Monet Hess  Address: [place]   [facility] of [address] [employer] Phone: [phone]  Mobile Phone: [phone]  Relation: Other  Secondary Emergency Contact: ErikErin  Address: [address] [place] [address], [address] [place] [address] of [address] [employer] Phone: [phone]  Work Phone: [phone]  Relation: Other    Past Surgical History:  Past Surgical History:   Procedure Laterality Date    FOOT SURGERY      right foot    GASTRIC BYPASS SURGERY  2019    NERVE BIOPSY      out of right ankle    TONSILLECTOMY         Immunization History:   Immunization History   Administered Date(s) Administered    COVID-19, MODERNA BLUE border, Primary or Immunocompromised, (age 12y+), IM, 100 mcg/0.5mL 2021, 2021, 2022    COVID-19, MODERNA Bivalent, (age 12y+), IM, 48 mcg/0.5 mL 2022    Influenza 2010, 2011, 10/29/2012, 2013    TDaP, ADACEL (age 6y-58y), [facility] (age 10y+), IM, 0.5mL 2013       Active Problems:  Patient Active Problem List   Diagnosis Code    Asthma J45.909    Neuropathy, peroneal nerve G57.30    HTN (hypertension), benign I10    Hypertriglyceridemia E78.1    Trigger finger, acquired M65.30    Osteopenia M85.80    Chest pain T63.2    Diastolic dysfunction, left ventricle I51.9    GERD (gastroesophageal reflux disease) K21.9    Chronic inflammatory demyelinating polyneuritis (HCC) G61.81    Subdural bleeding (HCC) I62.00    Subdural hematoma ([address]) S06. 5XAA

## 2023-10-17 NOTE — PROGRESS NOTES
Patient agreed to discharge. Patient  in room for discharge instructions, questions answered with verbal acknowledgement received, papers signed and copies placed in soft chart. Staff transported patient via wheel chair to front [facility] and discharged home with all documented belongings. Patient status unchanged from morning assessment.

## 2023-10-17 NOTE — CARE COORDINATION
SOCIAL WORK DISCHARGE SUMMARY        DATE OF DISCHARGE:    Tuesday, 10-      LOCATION:     Home        Refused home care or out patient services.  TIME:    10   family        PHARMACY:   [facility]        DME:       none.     [place], [place]     Case Management   [phone]    10/17/2023  10:37 AM

## 2023-10-17 NOTE — CARE COORDINATION
Late Entry. Met with patient yesterday to review DC plan for Tuesday. She reports she does not understand why she is ready to dc but they need to give her a shower on Tuesday Morning. Informed her it may be about billing. She does not want any home care at this time. Discussion held regarding medical transportation but she denied stating she has people to assist her if needed. Discussion held regarding [address] for Emergency response button and home aid. She does want this for laundry and light house keeping. Her family will transport her home tomorrow at 10 am.  Imm provided to her. Referral sent today to [address] [place] [address],[address] [place] [address].   Aguilar Hernandez, [place]     Case Management   [phone]    10/17/2023  10:36 AM

## 2023-10-17 NOTE — DISCHARGE SUMMARY
Physical Medicine & Rehabilitation  Discharge Summary     Patient Identification:  Everette Tate  : 1955  Admit date: 10/10/2023  Discharge date: 10/17/2023   Attending provider: Felicity Mora MD        Primary care provider: Pearl Craft MD     Discharge Diagnoses:   Patient Active Problem List   Diagnosis    Asthma    Neuropathy, peroneal nerve    HTN (hypertension), benign    Hypertriglyceridemia    Trigger finger, acquired    Osteopenia    Chest pain    Diastolic dysfunction, left ventricle    GERD (gastroesophageal reflux disease)    Chronic inflammatory demyelinating polyneuritis (HCC)    Subdural bleeding (HCC)    Subdural hematoma ([address])       History of Present Illness/Acute Hospital Course:  Patient is a 71 yo F with pmh CIDP, diastolic dysfunction, HTN, HLD, Asthma, GERD, obesity s/p gastric bypass who initially presented 10/6/2023 after being found down at home. Patient unable to provide details about circumstances of fall but states it was mechanical and she was down for 2 days prior to being found. She was unable to get up due to leg weakness. She denies loss of consciousness. She was found to have 3 mm thick bilateral supratentorial hemispheric subdural hematomas. There was initial concern for stroke but MRI was negative for acute infarct. Neurology is recommending holding Plavix until 10/18. Course complicated by rhabdomyolysis, GAIL, hyponatremia. Now presents to ARU with impaired mobility and self-care below her baseline. Inpatient Rehabilitation Course:   Everette Tate is a 70 y.o. female admitted to inpatient rehabilitation on 10/10/2023 with Subdural hematoma ([address]). The patient participated in an aggressive multidisciplinary inpatient rehabilitation program involving 3 hours of therapy per day, at least 5 days per week. She made good progress with regard to strength, balance, cognition, compensatory strategies. Now overall modified independent. Discharging home alone.  She

## 2023-10-17 NOTE — PROGRESS NOTES
discharge. PT/OT to provide HEP. DME: medical alert button  ELOS: 10/17      Justin.  Jaycob Cisneros MD 10/16/2023, 11:52 PM

## 2024-01-11 ENCOUNTER — APPOINTMENT (OUTPATIENT)
Dept: CT IMAGING | Age: 69
DRG: 640 | End: 2024-01-11
Payer: MEDICARE

## 2024-01-11 ENCOUNTER — APPOINTMENT (OUTPATIENT)
Dept: GENERAL RADIOLOGY | Age: 69
DRG: 640 | End: 2024-01-11
Payer: MEDICARE

## 2024-01-11 ENCOUNTER — HOSPITAL ENCOUNTER (INPATIENT)
Age: 69
LOS: 4 days | Discharge: HOME HEALTH | DRG: 640 | End: 2024-01-17
Attending: EMERGENCY MEDICINE | Admitting: INTERNAL MEDICINE
Payer: MEDICARE

## 2024-01-11 DIAGNOSIS — E87.1 HYPONATREMIA: ICD-10-CM

## 2024-01-11 DIAGNOSIS — R41.82 ALTERED MENTAL STATUS, UNSPECIFIED ALTERED MENTAL STATUS TYPE: Primary | ICD-10-CM

## 2024-01-11 DIAGNOSIS — D75.839 THROMBOCYTOSIS: ICD-10-CM

## 2024-01-11 DIAGNOSIS — S06.5XAA SUBDURAL HEMATOMA (HCC): ICD-10-CM

## 2024-01-11 LAB
ALBUMIN SERPL-MCNC: 3.4 G/DL (ref 3.4–5)
ALBUMIN/GLOB SERPL: 1 {RATIO} (ref 1.1–2.2)
ALP SERPL-CCNC: 84 U/L (ref 40–129)
ALT SERPL-CCNC: <5 U/L (ref 10–40)
AMMONIA PLAS-SCNC: 15 UMOL/L (ref 11–51)
AMPHETAMINES UR QL SCN>1000 NG/ML: NORMAL
ANION GAP SERPL CALCULATED.3IONS-SCNC: 21 MMOL/L (ref 3–16)
AST SERPL-CCNC: 15 U/L (ref 15–37)
BACTERIA URNS QL MICRO: ABNORMAL /HPF
BARBITURATES UR QL SCN>200 NG/ML: NORMAL
BASE EXCESS BLDV CALC-SCNC: 4.2 MMOL/L
BASOPHILS # BLD: 0 K/UL (ref 0–0.2)
BASOPHILS NFR BLD: 0.3 %
BENZODIAZ UR QL SCN>200 NG/ML: NORMAL
BILIRUB SERPL-MCNC: <0.2 MG/DL (ref 0–1)
BILIRUB UR QL STRIP.AUTO: NEGATIVE
BUN SERPL-MCNC: 16 MG/DL (ref 7–20)
CALCIUM SERPL-MCNC: 6.3 MG/DL (ref 8.3–10.6)
CANNABINOIDS UR QL SCN>50 NG/ML: NORMAL
CASTS #/AREA URNS LPF: ABNORMAL /LPF
CHARACTER UR: ABNORMAL
CHLORIDE SERPL-SCNC: 86 MMOL/L (ref 99–110)
CK SERPL-CCNC: 112 U/L (ref 26–192)
CLARITY UR: CLEAR
CO2 BLDV-SCNC: 32 MMOL/L
CO2 SERPL-SCNC: 21 MMOL/L (ref 21–32)
COARSE GRAN CASTS #/AREA URNS LPF: ABNORMAL /LPF (ref 0–2)
COCAINE UR QL SCN: NORMAL
COHGB MFR BLDV: 1.2 %
COLOR UR: YELLOW
CREAT SERPL-MCNC: 0.8 MG/DL (ref 0.6–1.2)
DEPRECATED RDW RBC AUTO: 14.3 % (ref 12.4–15.4)
DRUG SCREEN COMMENT UR-IMP: NORMAL
EOSINOPHIL # BLD: 0.1 K/UL (ref 0–0.6)
EOSINOPHIL NFR BLD: 0.5 %
EPI CELLS #/AREA URNS AUTO: 1 /HPF (ref 0–5)
ETHANOLAMINE SERPL-MCNC: NORMAL MG/DL (ref 0–0.08)
FENTANYL SCREEN, URINE: NORMAL
GFR SERPLBLD CREATININE-BSD FMLA CKD-EPI: >60 ML/MIN/{1.73_M2}
GLUCOSE BLD-MCNC: 103 MG/DL (ref 70–99)
GLUCOSE SERPL-MCNC: 118 MG/DL (ref 70–99)
GLUCOSE UR STRIP.AUTO-MCNC: NEGATIVE MG/DL
HCO3 BLDV-SCNC: 30 MMOL/L (ref 23–29)
HCT VFR BLD AUTO: 36.2 % (ref 36–48)
HGB BLD-MCNC: 12 G/DL (ref 12–16)
HGB UR QL STRIP.AUTO: ABNORMAL
HYALINE CASTS #/AREA URNS LPF: ABNORMAL /LPF (ref 0–2)
INR PPP: 1.21 (ref 0.84–1.16)
KETONES UR STRIP.AUTO-MCNC: ABNORMAL MG/DL
LACTATE BLDV-SCNC: 1.4 MMOL/L (ref 0.4–2)
LEUKOCYTE ESTERASE UR QL STRIP.AUTO: NEGATIVE
LYMPHOCYTES # BLD: 0.6 K/UL (ref 1–5.1)
LYMPHOCYTES NFR BLD: 3.8 %
MCH RBC QN AUTO: 28.2 PG (ref 26–34)
MCHC RBC AUTO-ENTMCNC: 33.2 G/DL (ref 31–36)
MCV RBC AUTO: 84.9 FL (ref 80–100)
METHADONE UR QL SCN>300 NG/ML: NORMAL
METHGB MFR BLDV: 0.4 %
MONOCYTES # BLD: 0.8 K/UL (ref 0–1.3)
MONOCYTES NFR BLD: 5.1 %
MUCOUS THREADS #/AREA URNS LPF: ABNORMAL /LPF
NEUTROPHILS # BLD: 14.5 K/UL (ref 1.7–7.7)
NEUTROPHILS NFR BLD: 90.3 %
NITRITE UR QL STRIP.AUTO: NEGATIVE
O2 THERAPY: ABNORMAL
OPIATES UR QL SCN>300 NG/ML: NORMAL
OXYCODONE UR QL SCN: NORMAL
PCO2 BLDV: 49 MMHG (ref 40–50)
PCP UR QL SCN>25 NG/ML: NORMAL
PERFORMED ON: ABNORMAL
PH BLDV: 7.39 [PH] (ref 7.35–7.45)
PH UR STRIP.AUTO: 6 [PH] (ref 5–8)
PH UR STRIP: 6 [PH]
PLATELET # BLD AUTO: 689 K/UL (ref 135–450)
PMV BLD AUTO: 8.4 FL (ref 5–10.5)
PO2 BLDV: <30 MMHG
POTASSIUM SERPL-SCNC: 3.7 MMOL/L (ref 3.5–5.1)
PROT SERPL-MCNC: 6.7 G/DL (ref 6.4–8.2)
PROT UR STRIP.AUTO-MCNC: 100 MG/DL
PROTHROMBIN TIME: 15.3 SEC (ref 11.5–14.8)
RBC # BLD AUTO: 4.27 M/UL (ref 4–5.2)
RBC #/AREA URNS HPF: ABNORMAL /HPF (ref 0–4)
RENAL EPI CELLS #/AREA UR COMP ASSIST: ABNORMAL /HPF (ref 0–1)
SAO2 % BLDV: 24 %
SARS-COV-2 RDRP RESP QL NAA+PROBE: NOT DETECTED
SODIUM SERPL-SCNC: 128 MMOL/L (ref 136–145)
SODIUM UR-SCNC: <20 MMOL/L
SP GR UR STRIP.AUTO: 1.01 (ref 1–1.03)
TROPONIN, HIGH SENSITIVITY: 33 NG/L (ref 0–14)
UA COMPLETE W REFLEX CULTURE PNL UR: ABNORMAL
UA DIPSTICK W REFLEX MICRO PNL UR: YES
URN SPEC COLLECT METH UR: ABNORMAL
UROBILINOGEN UR STRIP-ACNC: 1 E.U./DL
WAXY CASTS #/AREA URNS LPF: ABNORMAL /LPF
WBC # BLD AUTO: 16 K/UL (ref 4–11)
WBC #/AREA URNS AUTO: 6 /HPF (ref 0–5)

## 2024-01-11 PROCEDURE — 93005 ELECTROCARDIOGRAM TRACING: CPT | Performed by: PHYSICIAN ASSISTANT

## 2024-01-11 PROCEDURE — 36415 COLL VENOUS BLD VENIPUNCTURE: CPT

## 2024-01-11 PROCEDURE — 81001 URINALYSIS AUTO W/SCOPE: CPT

## 2024-01-11 PROCEDURE — G0378 HOSPITAL OBSERVATION PER HR: HCPCS

## 2024-01-11 PROCEDURE — 82140 ASSAY OF AMMONIA: CPT

## 2024-01-11 PROCEDURE — 87040 BLOOD CULTURE FOR BACTERIA: CPT

## 2024-01-11 PROCEDURE — 70450 CT HEAD/BRAIN W/O DYE: CPT

## 2024-01-11 PROCEDURE — 6360000002 HC RX W HCPCS: Performed by: EMERGENCY MEDICINE

## 2024-01-11 PROCEDURE — 83605 ASSAY OF LACTIC ACID: CPT

## 2024-01-11 PROCEDURE — 72125 CT NECK SPINE W/O DYE: CPT

## 2024-01-11 PROCEDURE — 36569 INSJ PICC 5 YR+ W/O IMAGING: CPT

## 2024-01-11 PROCEDURE — 80053 COMPREHEN METABOLIC PANEL: CPT

## 2024-01-11 PROCEDURE — 80307 DRUG TEST PRSMV CHEM ANLYZR: CPT

## 2024-01-11 PROCEDURE — C1751 CATH, INF, PER/CENT/MIDLINE: HCPCS

## 2024-01-11 PROCEDURE — 71045 X-RAY EXAM CHEST 1 VIEW: CPT

## 2024-01-11 PROCEDURE — 82077 ASSAY SPEC XCP UR&BREATH IA: CPT

## 2024-01-11 PROCEDURE — 83935 ASSAY OF URINE OSMOLALITY: CPT

## 2024-01-11 PROCEDURE — 84484 ASSAY OF TROPONIN QUANT: CPT

## 2024-01-11 PROCEDURE — 99285 EMERGENCY DEPT VISIT HI MDM: CPT

## 2024-01-11 PROCEDURE — 82803 BLOOD GASES ANY COMBINATION: CPT

## 2024-01-11 PROCEDURE — 02HV33Z INSERTION OF INFUSION DEVICE INTO SUPERIOR VENA CAVA, PERCUTANEOUS APPROACH: ICD-10-PCS | Performed by: INTERNAL MEDICINE

## 2024-01-11 PROCEDURE — 85610 PROTHROMBIN TIME: CPT

## 2024-01-11 PROCEDURE — 82550 ASSAY OF CK (CPK): CPT

## 2024-01-11 PROCEDURE — 96365 THER/PROPH/DIAG IV INF INIT: CPT

## 2024-01-11 PROCEDURE — 84300 ASSAY OF URINE SODIUM: CPT

## 2024-01-11 PROCEDURE — 87635 SARS-COV-2 COVID-19 AMP PRB: CPT

## 2024-01-11 PROCEDURE — 85025 COMPLETE CBC W/AUTO DIFF WBC: CPT

## 2024-01-11 RX ORDER — ONDANSETRON 4 MG/1
4 TABLET, ORALLY DISINTEGRATING ORAL EVERY 8 HOURS PRN
Status: DISCONTINUED | OUTPATIENT
Start: 2024-01-11 | End: 2024-01-17 | Stop reason: HOSPADM

## 2024-01-11 RX ORDER — LEVETIRACETAM 10 MG/ML
1000 INJECTION INTRAVASCULAR ONCE
Status: COMPLETED | OUTPATIENT
Start: 2024-01-11 | End: 2024-01-11

## 2024-01-11 RX ORDER — LIDOCAINE HYDROCHLORIDE 10 MG/ML
5 INJECTION, SOLUTION EPIDURAL; INFILTRATION; INTRACAUDAL; PERINEURAL ONCE
Status: DISCONTINUED | OUTPATIENT
Start: 2024-01-11 | End: 2024-01-17 | Stop reason: HOSPADM

## 2024-01-11 RX ORDER — ACETAMINOPHEN 650 MG/1
650 SUPPOSITORY RECTAL EVERY 6 HOURS PRN
Status: DISCONTINUED | OUTPATIENT
Start: 2024-01-11 | End: 2024-01-17 | Stop reason: HOSPADM

## 2024-01-11 RX ORDER — POTASSIUM CHLORIDE 7.45 MG/ML
10 INJECTION INTRAVENOUS PRN
Status: DISCONTINUED | OUTPATIENT
Start: 2024-01-11 | End: 2024-01-17 | Stop reason: HOSPADM

## 2024-01-11 RX ORDER — PREDNISONE 1 MG/1
2 TABLET ORAL DAILY
Status: DISCONTINUED | OUTPATIENT
Start: 2024-01-12 | End: 2024-01-17 | Stop reason: HOSPADM

## 2024-01-11 RX ORDER — ALBUTEROL SULFATE 90 UG/1
2 AEROSOL, METERED RESPIRATORY (INHALATION) EVERY 6 HOURS PRN
Status: DISCONTINUED | OUTPATIENT
Start: 2024-01-11 | End: 2024-01-17 | Stop reason: HOSPADM

## 2024-01-11 RX ORDER — POTASSIUM CHLORIDE 20 MEQ/1
40 TABLET, EXTENDED RELEASE ORAL PRN
Status: DISCONTINUED | OUTPATIENT
Start: 2024-01-11 | End: 2024-01-17 | Stop reason: HOSPADM

## 2024-01-11 RX ORDER — POLYETHYLENE GLYCOL 3350 17 G/17G
17 POWDER, FOR SOLUTION ORAL DAILY PRN
Status: DISCONTINUED | OUTPATIENT
Start: 2024-01-11 | End: 2024-01-17 | Stop reason: HOSPADM

## 2024-01-11 RX ORDER — ACETAMINOPHEN 325 MG/1
650 TABLET ORAL EVERY 6 HOURS PRN
Status: DISCONTINUED | OUTPATIENT
Start: 2024-01-11 | End: 2024-01-17 | Stop reason: HOSPADM

## 2024-01-11 RX ORDER — ASPIRIN 81 MG/1
81 TABLET, CHEWABLE ORAL DAILY
Status: DISCONTINUED | OUTPATIENT
Start: 2024-01-12 | End: 2024-01-17 | Stop reason: HOSPADM

## 2024-01-11 RX ORDER — ENOXAPARIN SODIUM 100 MG/ML
40 INJECTION SUBCUTANEOUS DAILY
Status: DISCONTINUED | OUTPATIENT
Start: 2024-01-12 | End: 2024-01-11

## 2024-01-11 RX ORDER — CETIRIZINE HYDROCHLORIDE 10 MG/1
10 TABLET ORAL DAILY
Status: DISCONTINUED | OUTPATIENT
Start: 2024-01-12 | End: 2024-01-12

## 2024-01-11 RX ORDER — ERGOCALCIFEROL 1.25 MG/1
50000 CAPSULE ORAL WEEKLY
Status: DISCONTINUED | OUTPATIENT
Start: 2024-01-12 | End: 2024-01-17 | Stop reason: HOSPADM

## 2024-01-11 RX ORDER — METOPROLOL SUCCINATE 50 MG/1
100 TABLET, EXTENDED RELEASE ORAL DAILY
Status: DISCONTINUED | OUTPATIENT
Start: 2024-01-12 | End: 2024-01-17 | Stop reason: HOSPADM

## 2024-01-11 RX ORDER — SODIUM CHLORIDE 0.9 % (FLUSH) 0.9 %
5-40 SYRINGE (ML) INJECTION EVERY 12 HOURS SCHEDULED
Status: DISCONTINUED | OUTPATIENT
Start: 2024-01-11 | End: 2024-01-15 | Stop reason: SDUPTHER

## 2024-01-11 RX ORDER — BISACODYL 5 MG/1
5 TABLET, DELAYED RELEASE ORAL DAILY PRN
Status: DISCONTINUED | OUTPATIENT
Start: 2024-01-11 | End: 2024-01-17 | Stop reason: HOSPADM

## 2024-01-11 RX ORDER — MONTELUKAST SODIUM 10 MG/1
10 TABLET ORAL NIGHTLY
Status: DISCONTINUED | OUTPATIENT
Start: 2024-01-11 | End: 2024-01-17 | Stop reason: HOSPADM

## 2024-01-11 RX ORDER — SODIUM CHLORIDE 9 MG/ML
INJECTION, SOLUTION INTRAVENOUS PRN
Status: DISCONTINUED | OUTPATIENT
Start: 2024-01-11 | End: 2024-01-17 | Stop reason: HOSPADM

## 2024-01-11 RX ORDER — LABETALOL HYDROCHLORIDE 5 MG/ML
20 INJECTION, SOLUTION INTRAVENOUS EVERY 6 HOURS PRN
Status: DISCONTINUED | OUTPATIENT
Start: 2024-01-11 | End: 2024-01-17 | Stop reason: HOSPADM

## 2024-01-11 RX ORDER — AMLODIPINE BESYLATE 5 MG/1
5 TABLET ORAL DAILY
Status: DISCONTINUED | OUTPATIENT
Start: 2024-01-12 | End: 2024-01-17 | Stop reason: HOSPADM

## 2024-01-11 RX ORDER — SODIUM CHLORIDE 9 MG/ML
25 INJECTION, SOLUTION INTRAVENOUS PRN
Status: DISCONTINUED | OUTPATIENT
Start: 2024-01-11 | End: 2024-01-15 | Stop reason: SDUPTHER

## 2024-01-11 RX ORDER — FERROUS SULFATE 325(65) MG
325 TABLET ORAL
Status: DISCONTINUED | OUTPATIENT
Start: 2024-01-12 | End: 2024-01-17 | Stop reason: HOSPADM

## 2024-01-11 RX ORDER — SODIUM CHLORIDE 0.9 % (FLUSH) 0.9 %
5-40 SYRINGE (ML) INJECTION PRN
Status: DISCONTINUED | OUTPATIENT
Start: 2024-01-11 | End: 2024-01-15 | Stop reason: SDUPTHER

## 2024-01-11 RX ORDER — ONDANSETRON 2 MG/ML
4 INJECTION INTRAMUSCULAR; INTRAVENOUS EVERY 6 HOURS PRN
Status: DISCONTINUED | OUTPATIENT
Start: 2024-01-11 | End: 2024-01-17 | Stop reason: HOSPADM

## 2024-01-11 RX ORDER — SODIUM CHLORIDE 0.9 % (FLUSH) 0.9 %
5-40 SYRINGE (ML) INJECTION PRN
Status: DISCONTINUED | OUTPATIENT
Start: 2024-01-11 | End: 2024-01-17 | Stop reason: HOSPADM

## 2024-01-11 RX ORDER — METAXALONE 800 MG/1
800 TABLET ORAL 3 TIMES DAILY
Status: DISCONTINUED | OUTPATIENT
Start: 2024-01-11 | End: 2024-01-12

## 2024-01-11 RX ORDER — SODIUM CHLORIDE 0.9 % (FLUSH) 0.9 %
5-40 SYRINGE (ML) INJECTION EVERY 12 HOURS SCHEDULED
Status: DISCONTINUED | OUTPATIENT
Start: 2024-01-11 | End: 2024-01-17 | Stop reason: HOSPADM

## 2024-01-11 RX ORDER — PANTOPRAZOLE SODIUM 40 MG/1
40 TABLET, DELAYED RELEASE ORAL
Status: DISCONTINUED | OUTPATIENT
Start: 2024-01-12 | End: 2024-01-17 | Stop reason: HOSPADM

## 2024-01-11 RX ORDER — FUROSEMIDE 20 MG/1
20 TABLET ORAL DAILY
Status: DISCONTINUED | OUTPATIENT
Start: 2024-01-12 | End: 2024-01-12

## 2024-01-11 RX ORDER — MAGNESIUM SULFATE IN WATER 40 MG/ML
2000 INJECTION, SOLUTION INTRAVENOUS PRN
Status: DISCONTINUED | OUTPATIENT
Start: 2024-01-11 | End: 2024-01-17 | Stop reason: HOSPADM

## 2024-01-11 RX ADMIN — LEVETIRACETAM 1000 MG: 10 INJECTION, SOLUTION INTRAVENOUS at 21:13

## 2024-01-11 ASSESSMENT — PAIN SCALES - GENERAL: PAINLEVEL_OUTOF10: 0

## 2024-01-11 ASSESSMENT — LIFESTYLE VARIABLES
HOW OFTEN DO YOU HAVE A DRINK CONTAINING ALCOHOL: NEVER
HOW MANY STANDARD DRINKS CONTAINING ALCOHOL DO YOU HAVE ON A TYPICAL DAY: PATIENT DOES NOT DRINK

## 2024-01-11 ASSESSMENT — PAIN - FUNCTIONAL ASSESSMENT: PAIN_FUNCTIONAL_ASSESSMENT: 0-10

## 2024-01-11 NOTE — ED PROVIDER NOTES
[facility] EMERGENCY DEPARTMENT  EMERGENCY DEPARTMENT ENCOUNTER        Pt Name: Malika Rosas  MRN: [id number]  Birthdate 1955  Date of evaluation: 1/11/2024  Provider: BRANDIN Castillo  PCP: Zaida Morocho MD  Note Started: 6:46 PM EST 1/11/24       I have seen and evaluated this patient with my supervising physician Dr. Newby.      CHIEF COMPLAINT       Chief Complaint   Patient presents with    Altered Mental Status     Pt was found at home not herself for about 1 week per daughter. Pt was lethargic and hesitant when answering a question.        HISTORY OF PRESENT ILLNESS: 1 or more Elements     History from : Patient and EMS and family    Limitations to history : Altered Mental Status    Malika Rosas is a 68 y.o. female who presents for altered mental status via EMS from home where she lives alone.  The patient is confused.  I spoke with EMS.  They report that the patient has a cousin who frequently checks on the patient spoke with the patient over 24 hours ago yesterday evening and she seemed normal.  When she caught her today she seemed confused so she came of the house and then called 911.  EMS reports they found her on the floor.  The patient does not recall falling but believes she may have fallen last night.  She does have a past medical history of subdural hematoma, neuropathy, asthma, hypertension.  She is oriented to person place and she knows she is in the hospital but thinks she is at [facility].  She is not aware of the date.  She denies numbness, chest pain, shortness of breath or abdominal pain.  No vomiting diarrhea fevers or recent illness.  Not currently anticoagulated.  Per EMS there were \"multiple pill bottles scattered across the table.\"  I spoke with the patient's cousin at the bedside.  She tells me is actually not been herself since Jamir.  She has been declining over the past week.  Yesterday she also did not seem normal but today has been much

## 2024-01-12 ENCOUNTER — APPOINTMENT (OUTPATIENT)
Dept: CT IMAGING | Age: 69
DRG: 640 | End: 2024-01-12
Payer: MEDICARE

## 2024-01-12 ENCOUNTER — APPOINTMENT (OUTPATIENT)
Dept: MRI IMAGING | Age: 69
DRG: 640 | End: 2024-01-12
Payer: MEDICARE

## 2024-01-12 LAB
25(OH)D3 SERPL-MCNC: 43.6 NG/ML
ANION GAP SERPL CALCULATED.3IONS-SCNC: 12 MMOL/L (ref 3–16)
BASOPHILS # BLD: 0 K/UL (ref 0–0.2)
BASOPHILS NFR BLD: 0 %
BUN SERPL-MCNC: 26 MG/DL (ref 7–20)
CA-I BLD-SCNC: 1.28 MMOL/L (ref 1.12–1.32)
CALCIUM SERPL-MCNC: 9.8 MG/DL (ref 8.3–10.6)
CHLORIDE SERPL-SCNC: 91 MMOL/L (ref 99–110)
CO2 SERPL-SCNC: 25 MMOL/L (ref 21–32)
CREAT SERPL-MCNC: 1 MG/DL (ref 0.6–1.2)
DEPRECATED RDW RBC AUTO: 14.2 % (ref 12.4–15.4)
EKG ATRIAL RATE: 100 BPM
EKG DIAGNOSIS: NORMAL
EKG P AXIS: 51 DEGREES
EKG P-R INTERVAL: 194 MS
EKG Q-T INTERVAL: 386 MS
EKG QRS DURATION: 138 MS
EKG QTC CALCULATION (BAZETT): 497 MS
EKG R AXIS: -24 DEGREES
EKG T AXIS: 98 DEGREES
EKG VENTRICULAR RATE: 100 BPM
EOSINOPHIL # BLD: 0.3 K/UL (ref 0–0.6)
EOSINOPHIL NFR BLD: 2 %
FOLATE SERPL-MCNC: 12.19 NG/ML (ref 4.78–24.2)
GFR SERPLBLD CREATININE-BSD FMLA CKD-EPI: >60 ML/MIN/{1.73_M2}
GLUCOSE BLD-MCNC: 226 MG/DL (ref 70–99)
GLUCOSE SERPL-MCNC: 132 MG/DL (ref 70–99)
HCT VFR BLD AUTO: 30.8 % (ref 36–48)
HGB BLD-MCNC: 10.2 G/DL (ref 12–16)
LYMPHOCYTES # BLD: 0.3 K/UL (ref 1–5.1)
LYMPHOCYTES NFR BLD: 2 %
MAGNESIUM SERPL-MCNC: 1.9 MG/DL (ref 1.8–2.4)
MCH RBC QN AUTO: 27.5 PG (ref 26–34)
MCHC RBC AUTO-ENTMCNC: 33.1 G/DL (ref 31–36)
MCV RBC AUTO: 83.2 FL (ref 80–100)
METAMYELOCYTES NFR BLD MANUAL: 2 %
MONOCYTES # BLD: 0.5 K/UL (ref 0–1.3)
MONOCYTES NFR BLD: 4 %
NEUTROPHILS # BLD: 12.2 K/UL (ref 1.7–7.7)
NEUTROPHILS NFR BLD: 73 %
NEUTS BAND NFR BLD MANUAL: 17 % (ref 0–7)
OSMOLALITY SERPL: 285 MOSM/KG (ref 280–301)
OSMOLALITY UR: 278 MOSM/KG (ref 390–1070)
PERFORMED ON: ABNORMAL
PH BLDV: 7.44 [PH] (ref 7.35–7.45)
PLATELET # BLD AUTO: 692 K/UL (ref 135–450)
PMV BLD AUTO: 8.6 FL (ref 5–10.5)
POTASSIUM SERPL-SCNC: 3.3 MMOL/L (ref 3.5–5.1)
PROCALCITONIN SERPL IA-MCNC: 0.16 NG/ML (ref 0–0.15)
PTH-INTACT SERPL-MCNC: 34.9 PG/ML (ref 14–72)
RBC # BLD AUTO: 3.7 M/UL (ref 4–5.2)
RBC MORPH BLD: NORMAL
SODIUM SERPL-SCNC: 127 MMOL/L (ref 136–145)
SODIUM SERPL-SCNC: 127 MMOL/L (ref 136–145)
SODIUM SERPL-SCNC: 128 MMOL/L (ref 136–145)
TSH SERPL DL<=0.005 MIU/L-ACNC: 1.22 UIU/ML (ref 0.27–4.2)
URATE SERPL-MCNC: 7.2 MG/DL (ref 2.6–6)
VIT B12 SERPL-MCNC: 1547 PG/ML (ref 211–911)
WBC # BLD AUTO: 13.3 K/UL (ref 4–11)

## 2024-01-12 PROCEDURE — 84443 ASSAY THYROID STIM HORMONE: CPT

## 2024-01-12 PROCEDURE — 2580000003 HC RX 258: Performed by: PHYSICIAN ASSISTANT

## 2024-01-12 PROCEDURE — 83735 ASSAY OF MAGNESIUM: CPT

## 2024-01-12 PROCEDURE — 82306 VITAMIN D 25 HYDROXY: CPT

## 2024-01-12 PROCEDURE — 85025 COMPLETE CBC W/AUTO DIFF WBC: CPT

## 2024-01-12 PROCEDURE — 87040 BLOOD CULTURE FOR BACTERIA: CPT

## 2024-01-12 PROCEDURE — 93010 ELECTROCARDIOGRAM REPORT: CPT | Performed by: INTERNAL MEDICINE

## 2024-01-12 PROCEDURE — 2580000003 HC RX 258: Performed by: INTERNAL MEDICINE

## 2024-01-12 PROCEDURE — 6370000000 HC RX 637 (ALT 250 FOR IP): Performed by: INTERNAL MEDICINE

## 2024-01-12 PROCEDURE — 84295 ASSAY OF SERUM SODIUM: CPT

## 2024-01-12 PROCEDURE — 94640 AIRWAY INHALATION TREATMENT: CPT

## 2024-01-12 PROCEDURE — 82607 VITAMIN B-12: CPT

## 2024-01-12 PROCEDURE — G0378 HOSPITAL OBSERVATION PER HR: HCPCS

## 2024-01-12 PROCEDURE — 83970 ASSAY OF PARATHORMONE: CPT

## 2024-01-12 PROCEDURE — 70450 CT HEAD/BRAIN W/O DYE: CPT

## 2024-01-12 PROCEDURE — 84550 ASSAY OF BLOOD/URIC ACID: CPT

## 2024-01-12 PROCEDURE — 83930 ASSAY OF BLOOD OSMOLALITY: CPT

## 2024-01-12 PROCEDURE — 84145 PROCALCITONIN (PCT): CPT

## 2024-01-12 PROCEDURE — 70551 MRI BRAIN STEM W/O DYE: CPT

## 2024-01-12 PROCEDURE — 82746 ASSAY OF FOLIC ACID SERUM: CPT

## 2024-01-12 PROCEDURE — 80048 BASIC METABOLIC PNL TOTAL CA: CPT

## 2024-01-12 PROCEDURE — 94760 N-INVAS EAR/PLS OXIMETRY 1: CPT

## 2024-01-12 PROCEDURE — 82330 ASSAY OF CALCIUM: CPT

## 2024-01-12 PROCEDURE — 36415 COLL VENOUS BLD VENIPUNCTURE: CPT

## 2024-01-12 RX ORDER — SODIUM CHLORIDE 9 MG/ML
INJECTION, SOLUTION INTRAVENOUS CONTINUOUS
Status: DISCONTINUED | OUTPATIENT
Start: 2024-01-12 | End: 2024-01-17 | Stop reason: HOSPADM

## 2024-01-12 RX ORDER — LORAZEPAM 0.5 MG/1
0.25 TABLET ORAL ONCE
Status: COMPLETED | OUTPATIENT
Start: 2024-01-12 | End: 2024-01-12

## 2024-01-12 RX ORDER — TIOTROPIUM BROMIDE INHALATION SPRAY 3.12 UG/1
2 SPRAY, METERED RESPIRATORY (INHALATION)
COMMUNITY
Start: 2023-11-13

## 2024-01-12 RX ORDER — POTASSIUM CHLORIDE 20 MEQ/1
40 TABLET, EXTENDED RELEASE ORAL ONCE
Status: DISCONTINUED | OUTPATIENT
Start: 2024-01-12 | End: 2024-01-13

## 2024-01-12 RX ADMIN — Medication 10 ML: at 09:00

## 2024-01-12 RX ADMIN — METAXALONE 800 MG: 800 TABLET ORAL at 02:01

## 2024-01-12 RX ADMIN — CETIRIZINE HYDROCHLORIDE 10 MG: 10 TABLET, FILM COATED ORAL at 08:47

## 2024-01-12 RX ADMIN — MOMETASONE FUROATE AND FORMOTEROL FUMARATE DIHYDRATE 2 PUFF: 200; 5 AEROSOL RESPIRATORY (INHALATION) at 10:08

## 2024-01-12 RX ADMIN — ASPIRIN 81 MG: 81 TABLET, CHEWABLE ORAL at 08:47

## 2024-01-12 RX ADMIN — ERGOCALCIFEROL 50000 UNITS: 1.25 CAPSULE ORAL at 08:46

## 2024-01-12 RX ADMIN — METAXALONE 800 MG: 800 TABLET ORAL at 12:48

## 2024-01-12 RX ADMIN — PANTOPRAZOLE SODIUM 40 MG: 40 TABLET, DELAYED RELEASE ORAL at 05:12

## 2024-01-12 RX ADMIN — Medication 10 ML: at 22:20

## 2024-01-12 RX ADMIN — Medication 10 ML: at 22:19

## 2024-01-12 RX ADMIN — METAXALONE 800 MG: 800 TABLET ORAL at 09:44

## 2024-01-12 RX ADMIN — FERROUS SULFATE TAB 325 MG (65 MG ELEMENTAL FE) 325 MG: 325 (65 FE) TAB at 08:47

## 2024-01-12 RX ADMIN — LORAZEPAM 0.25 MG: 0.5 TABLET ORAL at 12:48

## 2024-01-12 RX ADMIN — Medication 10 ML: at 02:01

## 2024-01-12 RX ADMIN — POTASSIUM CHLORIDE 40 MEQ: 1500 TABLET, EXTENDED RELEASE ORAL at 05:42

## 2024-01-12 RX ADMIN — SODIUM CHLORIDE: 9 INJECTION, SOLUTION INTRAVENOUS at 18:07

## 2024-01-12 RX ADMIN — PREDNISONE 2 MG: 1 TABLET ORAL at 09:44

## 2024-01-12 RX ADMIN — MONTELUKAST 10 MG: 10 TABLET, FILM COATED ORAL at 02:01

## 2024-01-12 RX ADMIN — MONTELUKAST 10 MG: 10 TABLET, FILM COATED ORAL at 22:19

## 2024-01-12 RX ADMIN — Medication 10 ML: at 09:01

## 2024-01-12 NOTE — H&P
abnormality. SINUSES: The visualized paranasal sinuses and mastoid air cells demonstrate no acute abnormality. SOFT TISSUES/SKULL:  No acute abnormality involving the visualized skull or soft tissues. CT SCAN CERVICAL SPINE: BONES/ALIGNMENT: The alignment of the cervical spine is within normal limits. No acute fractures or dislocations are seen. DEGENERATIVE CHANGES: There is mild degenerative disc disease of the cervical spine. SOFT TISSUES: There is no prevertebral soft tissue swelling.     1. Small bilateral mixed density subdural hematomas over the cerebral convexities as described above with no mass-effect or midline shift.  These have the appearance of subacute to chronic hematomas. 2. No acute osseous abnormality of the cervical spine.     XR CHEST PORTABLE    Result Date: 1/11/2024  EXAMINATION: ONE XRAY VIEW OF THE CHEST 1/11/2024 7:07 pm COMPARISON: October 6, 2023 HISTORY: ORDERING SYSTEM PROVIDED HISTORY: fall TECHNOLOGIST PROVIDED HISTORY: Reason for exam:->fall Reason for Exam: fall FINDINGS: The cardiomediastinal silhouette is mildly enlarged, stable. The lungs are clear. No pleural effusion or pneumothorax is present. No evidence of subdiaphragmatic free air or pneumomediastinum.     No acute cardiopulmonary process. Mild cardiomegaly, stable.         Electronically signed by Rasheeda Tamayo MD on 1/11/2024 at 10:45 PM

## 2024-01-12 NOTE — ED NOTES
Report called to BENITO Mata. CHAPO discussed. All questions answered. RN verbalized understanding.

## 2024-01-13 PROBLEM — R41.82 ALTERED MENTAL STATUS: Status: ACTIVE | Noted: 2024-01-13

## 2024-01-13 LAB
ANION GAP SERPL CALCULATED.3IONS-SCNC: 11 MMOL/L (ref 3–16)
BUN SERPL-MCNC: 31 MG/DL (ref 7–20)
CALCIUM SERPL-MCNC: 9.3 MG/DL (ref 8.3–10.6)
CHLORIDE SERPL-SCNC: 93 MMOL/L (ref 99–110)
CO2 SERPL-SCNC: 26 MMOL/L (ref 21–32)
CORTIS AM PEAK SERPL-MCNC: 20.1 UG/DL (ref 4.3–22.4)
CREAT SERPL-MCNC: 0.9 MG/DL (ref 0.6–1.2)
GFR SERPLBLD CREATININE-BSD FMLA CKD-EPI: >60 ML/MIN/{1.73_M2}
GLUCOSE SERPL-MCNC: 104 MG/DL (ref 70–99)
MAGNESIUM SERPL-MCNC: 1.8 MG/DL (ref 1.8–2.4)
PHOSPHATE SERPL-MCNC: 2.8 MG/DL (ref 2.5–4.9)
POTASSIUM SERPL-SCNC: 3.6 MMOL/L (ref 3.5–5.1)
SODIUM SERPL-SCNC: 126 MMOL/L (ref 136–145)
SODIUM SERPL-SCNC: 130 MMOL/L (ref 136–145)
SODIUM SERPL-SCNC: 131 MMOL/L (ref 136–145)
SODIUM SERPL-SCNC: 132 MMOL/L (ref 136–145)

## 2024-01-13 PROCEDURE — 97162 PT EVAL MOD COMPLEX 30 MIN: CPT

## 2024-01-13 PROCEDURE — 83735 ASSAY OF MAGNESIUM: CPT

## 2024-01-13 PROCEDURE — 6370000000 HC RX 637 (ALT 250 FOR IP): Performed by: INTERNAL MEDICINE

## 2024-01-13 PROCEDURE — 84295 ASSAY OF SERUM SODIUM: CPT

## 2024-01-13 PROCEDURE — 6370000000 HC RX 637 (ALT 250 FOR IP): Performed by: STUDENT IN AN ORGANIZED HEALTH CARE EDUCATION/TRAINING PROGRAM

## 2024-01-13 PROCEDURE — 97530 THERAPEUTIC ACTIVITIES: CPT

## 2024-01-13 PROCEDURE — 2580000003 HC RX 258: Performed by: PHYSICIAN ASSISTANT

## 2024-01-13 PROCEDURE — 82533 TOTAL CORTISOL: CPT

## 2024-01-13 PROCEDURE — G0378 HOSPITAL OBSERVATION PER HR: HCPCS

## 2024-01-13 PROCEDURE — 94760 N-INVAS EAR/PLS OXIMETRY 1: CPT

## 2024-01-13 PROCEDURE — 1200000000 HC SEMI PRIVATE

## 2024-01-13 PROCEDURE — 36415 COLL VENOUS BLD VENIPUNCTURE: CPT

## 2024-01-13 PROCEDURE — 97535 SELF CARE MNGMENT TRAINING: CPT

## 2024-01-13 PROCEDURE — 94640 AIRWAY INHALATION TREATMENT: CPT

## 2024-01-13 PROCEDURE — 84100 ASSAY OF PHOSPHORUS: CPT

## 2024-01-13 PROCEDURE — 2580000003 HC RX 258: Performed by: INTERNAL MEDICINE

## 2024-01-13 PROCEDURE — 80048 BASIC METABOLIC PNL TOTAL CA: CPT

## 2024-01-13 PROCEDURE — 97166 OT EVAL MOD COMPLEX 45 MIN: CPT

## 2024-01-13 RX ORDER — POTASSIUM CHLORIDE 20 MEQ/1
20 TABLET, EXTENDED RELEASE ORAL ONCE
Status: COMPLETED | OUTPATIENT
Start: 2024-01-13 | End: 2024-01-13

## 2024-01-13 RX ADMIN — PREDNISONE 2 MG: 1 TABLET ORAL at 09:28

## 2024-01-13 RX ADMIN — PANTOPRAZOLE SODIUM 40 MG: 40 TABLET, DELAYED RELEASE ORAL at 05:44

## 2024-01-13 RX ADMIN — Medication 10 ML: at 21:59

## 2024-01-13 RX ADMIN — MONTELUKAST 10 MG: 10 TABLET, FILM COATED ORAL at 21:58

## 2024-01-13 RX ADMIN — POTASSIUM CHLORIDE 20 MEQ: 1500 TABLET, EXTENDED RELEASE ORAL at 09:28

## 2024-01-13 RX ADMIN — METOPROLOL SUCCINATE 100 MG: 50 TABLET, EXTENDED RELEASE ORAL at 09:29

## 2024-01-13 RX ADMIN — MOMETASONE FUROATE AND FORMOTEROL FUMARATE DIHYDRATE 2 PUFF: 200; 5 AEROSOL RESPIRATORY (INHALATION) at 08:44

## 2024-01-13 RX ADMIN — FERROUS SULFATE TAB 325 MG (65 MG ELEMENTAL FE) 325 MG: 325 (65 FE) TAB at 09:28

## 2024-01-13 RX ADMIN — AMLODIPINE BESYLATE 5 MG: 5 TABLET ORAL at 09:29

## 2024-01-13 RX ADMIN — Medication 10 ML: at 21:58

## 2024-01-14 LAB
ANION GAP SERPL CALCULATED.3IONS-SCNC: 11 MMOL/L (ref 3–16)
BUN SERPL-MCNC: 18 MG/DL (ref 7–20)
CALCIUM SERPL-MCNC: 9.5 MG/DL (ref 8.3–10.6)
CHLORIDE SERPL-SCNC: 95 MMOL/L (ref 99–110)
CO2 SERPL-SCNC: 28 MMOL/L (ref 21–32)
CREAT SERPL-MCNC: 0.7 MG/DL (ref 0.6–1.2)
EKG ATRIAL RATE: 144 BPM
EKG DIAGNOSIS: NORMAL
EKG P AXIS: 80 DEGREES
EKG P-R INTERVAL: 142 MS
EKG Q-T INTERVAL: 300 MS
EKG QRS DURATION: 134 MS
EKG QTC CALCULATION (BAZETT): 446 MS
EKG R AXIS: -41 DEGREES
EKG T AXIS: 97 DEGREES
EKG VENTRICULAR RATE: 133 BPM
GFR SERPLBLD CREATININE-BSD FMLA CKD-EPI: >60 ML/MIN/{1.73_M2}
GLUCOSE SERPL-MCNC: 101 MG/DL (ref 70–99)
OSMOLALITY UR: 250 MOSM/KG (ref 390–1070)
POTASSIUM SERPL-SCNC: 4.3 MMOL/L (ref 3.5–5.1)
SODIUM SERPL-SCNC: 126 MMOL/L (ref 136–145)
SODIUM SERPL-SCNC: 131 MMOL/L (ref 136–145)
SODIUM SERPL-SCNC: 131 MMOL/L (ref 136–145)
SODIUM SERPL-SCNC: 134 MMOL/L (ref 136–145)
SODIUM UR-SCNC: 43 MMOL/L

## 2024-01-14 PROCEDURE — 94640 AIRWAY INHALATION TREATMENT: CPT

## 2024-01-14 PROCEDURE — 6370000000 HC RX 637 (ALT 250 FOR IP): Performed by: INTERNAL MEDICINE

## 2024-01-14 PROCEDURE — 80048 BASIC METABOLIC PNL TOTAL CA: CPT

## 2024-01-14 PROCEDURE — 1200000000 HC SEMI PRIVATE

## 2024-01-14 PROCEDURE — 2580000003 HC RX 258: Performed by: INTERNAL MEDICINE

## 2024-01-14 PROCEDURE — 94760 N-INVAS EAR/PLS OXIMETRY 1: CPT

## 2024-01-14 PROCEDURE — 93010 ELECTROCARDIOGRAM REPORT: CPT | Performed by: INTERNAL MEDICINE

## 2024-01-14 PROCEDURE — 84300 ASSAY OF URINE SODIUM: CPT

## 2024-01-14 PROCEDURE — 2580000003 HC RX 258: Performed by: PHYSICIAN ASSISTANT

## 2024-01-14 PROCEDURE — 93005 ELECTROCARDIOGRAM TRACING: CPT | Performed by: INTERNAL MEDICINE

## 2024-01-14 PROCEDURE — 83935 ASSAY OF URINE OSMOLALITY: CPT

## 2024-01-14 PROCEDURE — 84295 ASSAY OF SERUM SODIUM: CPT

## 2024-01-14 PROCEDURE — 36415 COLL VENOUS BLD VENIPUNCTURE: CPT

## 2024-01-14 RX ADMIN — METOPROLOL SUCCINATE 100 MG: 50 TABLET, EXTENDED RELEASE ORAL at 08:24

## 2024-01-14 RX ADMIN — Medication 10 ML: at 20:27

## 2024-01-14 RX ADMIN — PREDNISONE 2 MG: 1 TABLET ORAL at 08:24

## 2024-01-14 RX ADMIN — MONTELUKAST 10 MG: 10 TABLET, FILM COATED ORAL at 20:27

## 2024-01-14 RX ADMIN — AMLODIPINE BESYLATE 5 MG: 5 TABLET ORAL at 08:24

## 2024-01-14 RX ADMIN — PANTOPRAZOLE SODIUM 40 MG: 40 TABLET, DELAYED RELEASE ORAL at 05:49

## 2024-01-14 RX ADMIN — Medication 10 ML: at 20:28

## 2024-01-14 RX ADMIN — SODIUM CHLORIDE: 9 INJECTION, SOLUTION INTRAVENOUS at 05:36

## 2024-01-14 RX ADMIN — MOMETASONE FUROATE AND FORMOTEROL FUMARATE DIHYDRATE 2 PUFF: 200; 5 AEROSOL RESPIRATORY (INHALATION) at 08:19

## 2024-01-14 RX ADMIN — ACETAMINOPHEN 650 MG: 325 TABLET ORAL at 17:01

## 2024-01-14 RX ADMIN — FERROUS SULFATE TAB 325 MG (65 MG ELEMENTAL FE) 325 MG: 325 (65 FE) TAB at 08:24

## 2024-01-14 ASSESSMENT — PAIN DESCRIPTION - DESCRIPTORS: DESCRIPTORS: ACHING

## 2024-01-14 ASSESSMENT — PAIN SCALES - GENERAL: PAINLEVEL_OUTOF10: 3

## 2024-01-14 ASSESSMENT — PAIN DESCRIPTION - LOCATION: LOCATION: HEAD

## 2024-01-15 LAB
ANION GAP SERPL CALCULATED.3IONS-SCNC: 10 MMOL/L (ref 3–16)
BASOPHILS # BLD: 0.1 K/UL (ref 0–0.2)
BASOPHILS NFR BLD: 0.6 %
BUN SERPL-MCNC: 16 MG/DL (ref 7–20)
CALCIUM SERPL-MCNC: 9 MG/DL (ref 8.3–10.6)
CHLORIDE SERPL-SCNC: 96 MMOL/L (ref 99–110)
CO2 SERPL-SCNC: 25 MMOL/L (ref 21–32)
CREAT SERPL-MCNC: 0.6 MG/DL (ref 0.6–1.2)
DEPRECATED RDW RBC AUTO: 14.3 % (ref 12.4–15.4)
EOSINOPHIL # BLD: 0.3 K/UL (ref 0–0.6)
EOSINOPHIL NFR BLD: 2.9 %
GFR SERPLBLD CREATININE-BSD FMLA CKD-EPI: >60 ML/MIN/{1.73_M2}
GLUCOSE SERPL-MCNC: 95 MG/DL (ref 70–99)
HCT VFR BLD AUTO: 26.1 % (ref 36–48)
HGB BLD-MCNC: 8.7 G/DL (ref 12–16)
LYMPHOCYTES # BLD: 0.6 K/UL (ref 1–5.1)
LYMPHOCYTES NFR BLD: 4.6 %
MCH RBC QN AUTO: 27.8 PG (ref 26–34)
MCHC RBC AUTO-ENTMCNC: 33.3 G/DL (ref 31–36)
MCV RBC AUTO: 83.3 FL (ref 80–100)
MONOCYTES # BLD: 1.1 K/UL (ref 0–1.3)
MONOCYTES NFR BLD: 8.8 %
NEUTROPHILS # BLD: 9.9 K/UL (ref 1.7–7.7)
NEUTROPHILS NFR BLD: 83.1 %
PLATELET # BLD AUTO: 513 K/UL (ref 135–450)
PMV BLD AUTO: 8.8 FL (ref 5–10.5)
POTASSIUM SERPL-SCNC: 3.8 MMOL/L (ref 3.5–5.1)
RBC # BLD AUTO: 3.13 M/UL (ref 4–5.2)
SODIUM SERPL-SCNC: 128 MMOL/L (ref 136–145)
SODIUM SERPL-SCNC: 129 MMOL/L (ref 136–145)
SODIUM SERPL-SCNC: 131 MMOL/L (ref 136–145)
SODIUM SERPL-SCNC: 132 MMOL/L (ref 136–145)
WBC # BLD AUTO: 12 K/UL (ref 4–11)

## 2024-01-15 PROCEDURE — 85025 COMPLETE CBC W/AUTO DIFF WBC: CPT

## 2024-01-15 PROCEDURE — 94640 AIRWAY INHALATION TREATMENT: CPT

## 2024-01-15 PROCEDURE — 6370000000 HC RX 637 (ALT 250 FOR IP): Performed by: INTERNAL MEDICINE

## 2024-01-15 PROCEDURE — 94760 N-INVAS EAR/PLS OXIMETRY 1: CPT

## 2024-01-15 PROCEDURE — 2580000003 HC RX 258: Performed by: INTERNAL MEDICINE

## 2024-01-15 PROCEDURE — 97530 THERAPEUTIC ACTIVITIES: CPT

## 2024-01-15 PROCEDURE — 1200000000 HC SEMI PRIVATE

## 2024-01-15 PROCEDURE — 99222 1ST HOSP IP/OBS MODERATE 55: CPT | Performed by: INTERNAL MEDICINE

## 2024-01-15 PROCEDURE — 97535 SELF CARE MNGMENT TRAINING: CPT

## 2024-01-15 PROCEDURE — 97116 GAIT TRAINING THERAPY: CPT

## 2024-01-15 PROCEDURE — 84295 ASSAY OF SERUM SODIUM: CPT

## 2024-01-15 PROCEDURE — 97110 THERAPEUTIC EXERCISES: CPT

## 2024-01-15 PROCEDURE — 80048 BASIC METABOLIC PNL TOTAL CA: CPT

## 2024-01-15 RX ORDER — DIGOXIN 125 MCG
125 TABLET ORAL DAILY
Status: DISCONTINUED | OUTPATIENT
Start: 2024-01-15 | End: 2024-01-17 | Stop reason: HOSPADM

## 2024-01-15 RX ADMIN — SODIUM CHLORIDE: 9 INJECTION, SOLUTION INTRAVENOUS at 02:26

## 2024-01-15 RX ADMIN — MOMETASONE FUROATE AND FORMOTEROL FUMARATE DIHYDRATE 2 PUFF: 200; 5 AEROSOL RESPIRATORY (INHALATION) at 08:18

## 2024-01-15 RX ADMIN — AMLODIPINE BESYLATE 5 MG: 5 TABLET ORAL at 08:24

## 2024-01-15 RX ADMIN — ACETAMINOPHEN 650 MG: 325 TABLET ORAL at 02:23

## 2024-01-15 RX ADMIN — Medication 10 ML: at 22:03

## 2024-01-15 RX ADMIN — PREDNISONE 2 MG: 1 TABLET ORAL at 08:24

## 2024-01-15 RX ADMIN — SODIUM CHLORIDE: 9 INJECTION, SOLUTION INTRAVENOUS at 22:21

## 2024-01-15 RX ADMIN — BISACODYL 5 MG: 5 TABLET, COATED ORAL at 06:05

## 2024-01-15 RX ADMIN — METOPROLOL SUCCINATE 100 MG: 50 TABLET, EXTENDED RELEASE ORAL at 08:24

## 2024-01-15 RX ADMIN — FERROUS SULFATE TAB 325 MG (65 MG ELEMENTAL FE) 325 MG: 325 (65 FE) TAB at 08:24

## 2024-01-15 RX ADMIN — POLYETHYLENE GLYCOL 3350 17 G: 17 POWDER, FOR SOLUTION ORAL at 08:32

## 2024-01-15 RX ADMIN — DIGOXIN 125 MCG: 125 TABLET ORAL at 16:08

## 2024-01-15 RX ADMIN — ACETAMINOPHEN 650 MG: 325 TABLET ORAL at 22:08

## 2024-01-15 RX ADMIN — PANTOPRAZOLE SODIUM 40 MG: 40 TABLET, DELAYED RELEASE ORAL at 06:05

## 2024-01-15 RX ADMIN — ACETAMINOPHEN 650 MG: 325 TABLET ORAL at 16:08

## 2024-01-15 RX ADMIN — MONTELUKAST 10 MG: 10 TABLET, FILM COATED ORAL at 22:03

## 2024-01-15 ASSESSMENT — ENCOUNTER SYMPTOMS
CONSTIPATION: 0
WHEEZING: 0
SINUS PRESSURE: 0
ABDOMINAL PAIN: 0
SHORTNESS OF BREATH: 0
BACK PAIN: 0
BLOOD IN STOOL: 0
COLOR CHANGE: 0
SORE THROAT: 0
COUGH: 0
DIARRHEA: 0
VOMITING: 0
NAUSEA: 0
TROUBLE SWALLOWING: 0

## 2024-01-15 ASSESSMENT — PAIN DESCRIPTION - LOCATION
LOCATION: HEAD
LOCATION: LEG;FOOT
LOCATION: HEAD
LOCATION: HEAD

## 2024-01-15 ASSESSMENT — PAIN DESCRIPTION - FREQUENCY
FREQUENCY: CONTINUOUS
FREQUENCY: CONTINUOUS

## 2024-01-15 ASSESSMENT — PAIN SCALES - GENERAL
PAINLEVEL_OUTOF10: 10
PAINLEVEL_OUTOF10: 3
PAINLEVEL_OUTOF10: 3
PAINLEVEL_OUTOF10: 0
PAINLEVEL_OUTOF10: 3
PAINLEVEL_OUTOF10: 0

## 2024-01-15 ASSESSMENT — PAIN - FUNCTIONAL ASSESSMENT
PAIN_FUNCTIONAL_ASSESSMENT: ACTIVITIES ARE NOT PREVENTED
PAIN_FUNCTIONAL_ASSESSMENT: ACTIVITIES ARE NOT PREVENTED

## 2024-01-15 ASSESSMENT — PAIN DESCRIPTION - PAIN TYPE
TYPE: ACUTE PAIN;CHRONIC PAIN
TYPE: CHRONIC PAIN;NEUROPATHIC PAIN

## 2024-01-15 ASSESSMENT — PAIN DESCRIPTION - DESCRIPTORS
DESCRIPTORS: ACHING
DESCRIPTORS: ACHING;DISCOMFORT
DESCRIPTORS: ACHING

## 2024-01-15 ASSESSMENT — PAIN DESCRIPTION - ORIENTATION
ORIENTATION: ANTERIOR
ORIENTATION: RIGHT;LEFT

## 2024-01-15 ASSESSMENT — PAIN DESCRIPTION - ONSET: ONSET: ON-GOING

## 2024-01-15 NOTE — CONSULTS
Neurology Consult Note  Reason for Consult: altered mental status    Chief complaint: nothing specific    Zachary Javed MD asked me to see Malika Rosas in consultation for evaluation of     History of Present Illness:  Malika Rosas is a 68 y.o. female who presents with altered mental status.     I obtained my information via chart review.  The patient is confused and is unable to provide any reliable history.     From what I can gather, the patient is normally alert and oriented.  It is my understanding that the patient's mental status has been gradually deteriorating since around [place] time, possibly fluctuating.  Apparently yesterday a family member felt she was more confused and called EMS who found the patient on the bathroom floor altered.  They were able to help her up and she was able to use a walker.  No convulsive behavior was noted.  No tongue biting or urinary incontinence.  She was transported to the ED in order to be evaluated.      BP was 133/80.  CT head was concerning for SDH.  Neurosurgery was consulted from the ED who recommended a repeat study later in the evening which was stable.  Labs as below. She was given a dose of Keppra.     Currently she is resting in bed.  She remains confused.      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      The patient was also recently evaluated in October 2023 for a fall.  Initial CT head showed unchanged SDH.  MRI brain was w/out any acute intracranial findings.  EEG was recommended though she declined.  She was not discharged on an antiepileptic medication.      Medical History:  Past Medical History:   Diagnosis Date    Asthma     Chronic inflammatory demyelinating polyneuritis (HCC) 8/18/2017    Diastolic dysfunction     due to polyneuropathy    GERD (gastroesophageal reflux disease) 7/29/2014    Hypertension     Hypertriglyceridemia 7/23/2010    Neuropathy, peroneal nerve 3/15/2010    Obesity     Polyneuropathy     Sacral insufficiency fracture 
Max: 98.8 °F (37.1 °C)  Pulse  Av.1  Min: 69  Max: 117  BP  Min: 86/53  Max: 123/68  SpO2  Av.3 %  Min: 90 %  Max: 94 %  Telemetry: afib with v-rates 100-120's bpm    Physical Exam  Vitals reviewed.   Constitutional:       General: She is not in acute distress.     Appearance: Normal appearance. She is not ill-appearing.   HENT:      Head: Normocephalic and atraumatic.      Nose: Nose normal.      Mouth/Throat:      Mouth: Mucous membranes are moist.   Eyes:      Conjunctiva/sclera: Conjunctivae normal.      Pupils: Pupils are equal, round, and reactive to light.   Cardiovascular:      Rate and Rhythm: Tachycardia present. Rhythm irregular.      Heart sounds: Normal heart sounds, S1 normal and S2 normal. No murmur heard.     No friction rub. No gallop.   Pulmonary:      Effort: No respiratory distress.      Breath sounds: No wheezing, rhonchi or rales.   Abdominal:      General: Abdomen is flat. Bowel sounds are normal. There is no distension.      Palpations: Abdomen is soft.      Tenderness: There is no abdominal tenderness.   Musculoskeletal:         General: No tenderness. Normal range of motion.      Cervical back: Normal range of motion. No rigidity.      Right lower leg: No edema.      Left lower leg: No edema.   Skin:     General: Skin is warm and dry.      Coloration: Skin is not pale.      Findings: No bruising, erythema or rash.   Neurological:      General: No focal deficit present.      Mental Status: She is alert and oriented to person, place, and time.      Cranial Nerves: No cranial nerve deficit.      Motor: No weakness.      Gait: Gait normal.   Psychiatric:         Mood and Affect: Mood normal.         Behavior: Behavior normal.         Thought Content: Thought content normal.           Labs:  Reviewed.   Recent Labs     24  0538 24  1216 24  0555 24  1130 24  1816 24  2350 01/15/24  0604   *   < > 134*   < > 126* 129* 131*   K 3.6  --  4.3  --   
drift    SPEECH: Awake, A&Oriented x 4, speech fluent, intact     CN II-XII intact    Cerebellar exam WNL, no dysmetria        IMAGING:   CT HEAD WO CONTRAST   Final Result   Stable bilateral mixed density subdural hematomas. No significant mass-effect   or midline shift.         XR CHEST PORTABLE   Final Result   No acute cardiopulmonary process.      Mild cardiomegaly, stable.         CT HEAD WO CONTRAST   Final Result   1. Small bilateral mixed density subdural hematomas over the cerebral   convexities as described above with no mass-effect or midline shift.  These   have the appearance of subacute to chronic hematomas.   2. No acute osseous abnormality of the cervical spine.         CT CERVICAL SPINE WO CONTRAST   Final Result   1. Small bilateral mixed density subdural hematomas over the cerebral   convexities as described above with no mass-effect or midline shift.  These   have the appearance of subacute to chronic hematomas.   2. No acute osseous abnormality of the cervical spine.         MRI BRAIN WO CONTRAST    (Results Pending)       A/P:     Small JUSTINO mixed density SDH   No surgical intervention needed  No  FU required   Continue medical mgmt     Josias Tucker NP-DNOG  [facility]  [phone]  
01/12/2024 04:42 AM     BMP:    Lab Results   Component Value Date/Time     01/12/2024 11:06 AM    K 3.3 01/12/2024 04:42 AM    CL 91 01/12/2024 04:42 AM    CO2 25 01/12/2024 04:42 AM    BUN 26 01/12/2024 04:42 AM    LABALBU 3.4 01/11/2024 08:12 PM    CREATININE 1.0 01/12/2024 04:42 AM    CALCIUM 9.8 01/12/2024 04:42 AM    GFRAA >60 07/14/2014 10:48 PM    GFRAA >60 03/12/2013 01:21 AM    LABGLOM >60 01/12/2024 04:42 AM    GLUCOSE 132 01/12/2024 04:42 AM    GLUCOSE 79 07/18/2011 12:15 PM         Assessment/  1-Hyponatremia appears hypovolemic  suspect due to decreased oral intake and diuretic effect Urine Na+ < 20 urine osm 278 TSH OK   2-Hypocalcemia treated pth and Vit D OK   3-Bilateral SDH stable NS on board  4 Hypokalemia  5 HTN BP low  6 CHF compensated     Plan/  1-Stop Lasix   2-start gentle IVF's Hold lasix  3-Check uric Acid level, AM cortisol level  4 Monitor Na+ closely Avoid over correction   5 No indication for 3% saline at this time  6 Hold Amlodipine for SBP < 120     Thank you for the consultation.  Please do not hesitate to call with questions.    Willie Mahan MD, FACP

## 2024-01-16 LAB
ALBUMIN SERPL-MCNC: 2.8 G/DL (ref 3.4–5)
ANION GAP SERPL CALCULATED.3IONS-SCNC: 12 MMOL/L (ref 3–16)
BACTERIA BLD CULT ORG #2: NORMAL
BACTERIA BLD CULT: NORMAL
BUN SERPL-MCNC: 18 MG/DL (ref 7–20)
CALCIUM SERPL-MCNC: 9 MG/DL (ref 8.3–10.6)
CHLORIDE SERPL-SCNC: 97 MMOL/L (ref 99–110)
CO2 SERPL-SCNC: 25 MMOL/L (ref 21–32)
CREAT SERPL-MCNC: 0.7 MG/DL (ref 0.6–1.2)
EKG ATRIAL RATE: 61 BPM
EKG DIAGNOSIS: NORMAL
EKG Q-T INTERVAL: 348 MS
EKG QRS DURATION: 136 MS
EKG QTC CALCULATION (BAZETT): 487 MS
EKG R AXIS: -47 DEGREES
EKG T AXIS: 103 DEGREES
EKG VENTRICULAR RATE: 118 BPM
GFR SERPLBLD CREATININE-BSD FMLA CKD-EPI: >60 ML/MIN/{1.73_M2}
GLUCOSE SERPL-MCNC: 98 MG/DL (ref 70–99)
OSMOLALITY UR: 244 MOSM/KG (ref 390–1070)
PHOSPHATE SERPL-MCNC: 3.2 MG/DL (ref 2.5–4.9)
POTASSIUM SERPL-SCNC: 4 MMOL/L (ref 3.5–5.1)
SODIUM SERPL-SCNC: 128 MMOL/L (ref 136–145)
SODIUM SERPL-SCNC: 130 MMOL/L (ref 136–145)
SODIUM SERPL-SCNC: 133 MMOL/L (ref 136–145)
SODIUM SERPL-SCNC: 134 MMOL/L (ref 136–145)
SODIUM UR-SCNC: 24 MMOL/L
URATE SERPL-MCNC: 4.2 MG/DL (ref 2.6–6)

## 2024-01-16 PROCEDURE — 93005 ELECTROCARDIOGRAM TRACING: CPT | Performed by: NURSE PRACTITIONER

## 2024-01-16 PROCEDURE — 6370000000 HC RX 637 (ALT 250 FOR IP): Performed by: INTERNAL MEDICINE

## 2024-01-16 PROCEDURE — 83935 ASSAY OF URINE OSMOLALITY: CPT

## 2024-01-16 PROCEDURE — 94640 AIRWAY INHALATION TREATMENT: CPT

## 2024-01-16 PROCEDURE — 2580000003 HC RX 258: Performed by: INTERNAL MEDICINE

## 2024-01-16 PROCEDURE — 84550 ASSAY OF BLOOD/URIC ACID: CPT

## 2024-01-16 PROCEDURE — 80069 RENAL FUNCTION PANEL: CPT

## 2024-01-16 PROCEDURE — 84300 ASSAY OF URINE SODIUM: CPT

## 2024-01-16 PROCEDURE — 1200000000 HC SEMI PRIVATE

## 2024-01-16 PROCEDURE — 97530 THERAPEUTIC ACTIVITIES: CPT

## 2024-01-16 PROCEDURE — 84295 ASSAY OF SERUM SODIUM: CPT

## 2024-01-16 PROCEDURE — 94760 N-INVAS EAR/PLS OXIMETRY 1: CPT

## 2024-01-16 PROCEDURE — 97116 GAIT TRAINING THERAPY: CPT

## 2024-01-16 PROCEDURE — 99232 SBSQ HOSP IP/OBS MODERATE 35: CPT | Performed by: INTERNAL MEDICINE

## 2024-01-16 RX ORDER — DIGOXIN 0.25 MG/ML
125 INJECTION INTRAMUSCULAR; INTRAVENOUS ONCE
Status: DISCONTINUED | OUTPATIENT
Start: 2024-01-16 | End: 2024-01-17 | Stop reason: HOSPADM

## 2024-01-16 RX ADMIN — FERROUS SULFATE TAB 325 MG (65 MG ELEMENTAL FE) 325 MG: 325 (65 FE) TAB at 08:23

## 2024-01-16 RX ADMIN — DIGOXIN 125 MCG: 125 TABLET ORAL at 08:23

## 2024-01-16 RX ADMIN — ACETAMINOPHEN 650 MG: 325 TABLET ORAL at 12:25

## 2024-01-16 RX ADMIN — DILTIAZEM HYDROCHLORIDE 30 MG: 30 TABLET ORAL at 17:53

## 2024-01-16 RX ADMIN — MONTELUKAST 10 MG: 10 TABLET, FILM COATED ORAL at 21:03

## 2024-01-16 RX ADMIN — AMLODIPINE BESYLATE 5 MG: 5 TABLET ORAL at 08:24

## 2024-01-16 RX ADMIN — PREDNISONE 2 MG: 1 TABLET ORAL at 08:23

## 2024-01-16 RX ADMIN — Medication 10 ML: at 21:04

## 2024-01-16 RX ADMIN — METOPROLOL SUCCINATE 100 MG: 50 TABLET, EXTENDED RELEASE ORAL at 08:23

## 2024-01-16 RX ADMIN — MOMETASONE FUROATE AND FORMOTEROL FUMARATE DIHYDRATE 2 PUFF: 200; 5 AEROSOL RESPIRATORY (INHALATION) at 08:09

## 2024-01-16 RX ADMIN — DILTIAZEM HYDROCHLORIDE 30 MG: 30 TABLET ORAL at 11:44

## 2024-01-16 RX ADMIN — SODIUM CHLORIDE: 9 INJECTION, SOLUTION INTRAVENOUS at 16:00

## 2024-01-16 RX ADMIN — ACETAMINOPHEN 650 MG: 325 TABLET ORAL at 21:03

## 2024-01-16 RX ADMIN — DILTIAZEM HYDROCHLORIDE 30 MG: 30 TABLET ORAL at 23:05

## 2024-01-16 RX ADMIN — PANTOPRAZOLE SODIUM 40 MG: 40 TABLET, DELAYED RELEASE ORAL at 06:01

## 2024-01-16 ASSESSMENT — ENCOUNTER SYMPTOMS
COUGH: 0
TROUBLE SWALLOWING: 0
SINUS PRESSURE: 0
DIARRHEA: 0
ABDOMINAL PAIN: 0
WHEEZING: 0
VOMITING: 0
NAUSEA: 0
SORE THROAT: 0
BLOOD IN STOOL: 0
CONSTIPATION: 0
SHORTNESS OF BREATH: 0
COLOR CHANGE: 0
BACK PAIN: 0

## 2024-01-16 ASSESSMENT — PAIN SCALES - GENERAL
PAINLEVEL_OUTOF10: 0
PAINLEVEL_OUTOF10: 8
PAINLEVEL_OUTOF10: 3
PAINLEVEL_OUTOF10: 0
PAINLEVEL_OUTOF10: 0
PAINLEVEL_OUTOF10: 3
PAINLEVEL_OUTOF10: 0

## 2024-01-16 ASSESSMENT — PAIN DESCRIPTION - DESCRIPTORS
DESCRIPTORS: ACHING;DISCOMFORT

## 2024-01-16 ASSESSMENT — PAIN - FUNCTIONAL ASSESSMENT: PAIN_FUNCTIONAL_ASSESSMENT: ACTIVITIES ARE NOT PREVENTED

## 2024-01-16 ASSESSMENT — PAIN DESCRIPTION - LOCATION
LOCATION: HEAD
LOCATION: HEAD
LOCATION: SHOULDER
LOCATION: HEAD

## 2024-01-16 ASSESSMENT — PAIN DESCRIPTION - FREQUENCY: FREQUENCY: INTERMITTENT

## 2024-01-16 ASSESSMENT — PAIN DESCRIPTION - ONSET: ONSET: ON-GOING

## 2024-01-16 ASSESSMENT — PAIN DESCRIPTION - ORIENTATION: ORIENTATION: LEFT

## 2024-01-16 ASSESSMENT — PAIN DESCRIPTION - PAIN TYPE: TYPE: ACUTE PAIN

## 2024-01-17 VITALS
TEMPERATURE: 97.7 F | SYSTOLIC BLOOD PRESSURE: 146 MMHG | WEIGHT: 173.94 LBS | RESPIRATION RATE: 18 BRPM | HEIGHT: 63 IN | OXYGEN SATURATION: 93 % | DIASTOLIC BLOOD PRESSURE: 65 MMHG | HEART RATE: 99 BPM | BODY MASS INDEX: 30.82 KG/M2

## 2024-01-17 PROBLEM — R41.82 AMS (ALTERED MENTAL STATUS): Status: RESOLVED | Noted: 2024-01-11 | Resolved: 2024-01-17

## 2024-01-17 PROBLEM — I48.11 LONGSTANDING PERSISTENT ATRIAL FIBRILLATION (HCC): Status: ACTIVE | Noted: 2024-01-17

## 2024-01-17 PROBLEM — R41.82 ALTERED MENTAL STATUS: Status: RESOLVED | Noted: 2024-01-13 | Resolved: 2024-01-17

## 2024-01-17 LAB
ALBUMIN SERPL-MCNC: 2.8 G/DL (ref 3.4–5)
ANION GAP SERPL CALCULATED.3IONS-SCNC: 9 MMOL/L (ref 3–16)
BASOPHILS # BLD: 0.1 K/UL (ref 0–0.2)
BASOPHILS NFR BLD: 0.8 %
BUN SERPL-MCNC: 15 MG/DL (ref 7–20)
CALCIUM SERPL-MCNC: 9.2 MG/DL (ref 8.3–10.6)
CHLORIDE SERPL-SCNC: 101 MMOL/L (ref 99–110)
CO2 SERPL-SCNC: 26 MMOL/L (ref 21–32)
CREAT SERPL-MCNC: 0.6 MG/DL (ref 0.6–1.2)
DEPRECATED RDW RBC AUTO: 14.5 % (ref 12.4–15.4)
EOSINOPHIL # BLD: 0.5 K/UL (ref 0–0.6)
EOSINOPHIL NFR BLD: 4.9 %
GFR SERPLBLD CREATININE-BSD FMLA CKD-EPI: >60 ML/MIN/{1.73_M2}
GLUCOSE SERPL-MCNC: 99 MG/DL (ref 70–99)
HCT VFR BLD AUTO: 24.1 % (ref 36–48)
HGB BLD-MCNC: 8 G/DL (ref 12–16)
LYMPHOCYTES # BLD: 0.5 K/UL (ref 1–5.1)
LYMPHOCYTES NFR BLD: 4.7 %
MCH RBC QN AUTO: 27.9 PG (ref 26–34)
MCHC RBC AUTO-ENTMCNC: 33.5 G/DL (ref 31–36)
MCV RBC AUTO: 83.4 FL (ref 80–100)
MONOCYTES # BLD: 1 K/UL (ref 0–1.3)
MONOCYTES NFR BLD: 10 %
NEUTROPHILS # BLD: 7.7 K/UL (ref 1.7–7.7)
NEUTROPHILS NFR BLD: 79.6 %
PHOSPHATE SERPL-MCNC: 3.2 MG/DL (ref 2.5–4.9)
PLATELET # BLD AUTO: 519 K/UL (ref 135–450)
PMV BLD AUTO: 8.2 FL (ref 5–10.5)
POTASSIUM SERPL-SCNC: 3.7 MMOL/L (ref 3.5–5.1)
RBC # BLD AUTO: 2.88 M/UL (ref 4–5.2)
SODIUM SERPL-SCNC: 136 MMOL/L (ref 136–145)
URATE SERPL-MCNC: 3.9 MG/DL (ref 2.6–6)
WBC # BLD AUTO: 9.7 K/UL (ref 4–11)

## 2024-01-17 PROCEDURE — 85025 COMPLETE CBC W/AUTO DIFF WBC: CPT

## 2024-01-17 PROCEDURE — 6370000000 HC RX 637 (ALT 250 FOR IP): Performed by: INTERNAL MEDICINE

## 2024-01-17 PROCEDURE — 84550 ASSAY OF BLOOD/URIC ACID: CPT

## 2024-01-17 PROCEDURE — 94760 N-INVAS EAR/PLS OXIMETRY 1: CPT

## 2024-01-17 PROCEDURE — 94669 MECHANICAL CHEST WALL OSCILL: CPT

## 2024-01-17 PROCEDURE — 99232 SBSQ HOSP IP/OBS MODERATE 35: CPT | Performed by: NURSE PRACTITIONER

## 2024-01-17 PROCEDURE — 94640 AIRWAY INHALATION TREATMENT: CPT

## 2024-01-17 PROCEDURE — 80069 RENAL FUNCTION PANEL: CPT

## 2024-01-17 RX ORDER — DILTIAZEM HYDROCHLORIDE 120 MG/1
120 CAPSULE, COATED, EXTENDED RELEASE ORAL DAILY
Qty: 30 CAPSULE | Refills: 0 | Status: SHIPPED | OUTPATIENT
Start: 2024-01-17

## 2024-01-17 RX ORDER — DIGOXIN 125 MCG
125 TABLET ORAL DAILY
Qty: 15 TABLET | Refills: 0 | Status: SHIPPED | OUTPATIENT
Start: 2024-01-18

## 2024-01-17 RX ORDER — ASPIRIN 81 MG/1
81 TABLET, CHEWABLE ORAL DAILY
Qty: 30 TABLET | Refills: 0
Start: 2024-02-01

## 2024-01-17 RX ADMIN — DILTIAZEM HYDROCHLORIDE 30 MG: 30 TABLET ORAL at 12:31

## 2024-01-17 RX ADMIN — DIGOXIN 125 MCG: 125 TABLET ORAL at 08:58

## 2024-01-17 RX ADMIN — MOMETASONE FUROATE AND FORMOTEROL FUMARATE DIHYDRATE 2 PUFF: 200; 5 AEROSOL RESPIRATORY (INHALATION) at 09:28

## 2024-01-17 RX ADMIN — AMLODIPINE BESYLATE 5 MG: 5 TABLET ORAL at 08:57

## 2024-01-17 RX ADMIN — METOPROLOL SUCCINATE 100 MG: 50 TABLET, EXTENDED RELEASE ORAL at 08:57

## 2024-01-17 RX ADMIN — PANTOPRAZOLE SODIUM 40 MG: 40 TABLET, DELAYED RELEASE ORAL at 05:33

## 2024-01-17 RX ADMIN — FERROUS SULFATE TAB 325 MG (65 MG ELEMENTAL FE) 325 MG: 325 (65 FE) TAB at 08:58

## 2024-01-17 RX ADMIN — ACETAMINOPHEN 650 MG: 325 TABLET ORAL at 11:24

## 2024-01-17 RX ADMIN — DILTIAZEM HYDROCHLORIDE 30 MG: 30 TABLET ORAL at 05:33

## 2024-01-17 RX ADMIN — ALBUTEROL SULFATE 2 PUFF: 90 AEROSOL, METERED RESPIRATORY (INHALATION) at 09:29

## 2024-01-17 RX ADMIN — PREDNISONE 2 MG: 1 TABLET ORAL at 08:57

## 2024-01-17 ASSESSMENT — PAIN DESCRIPTION - ONSET
ONSET: ON-GOING
ONSET: ON-GOING

## 2024-01-17 ASSESSMENT — PAIN SCALES - GENERAL
PAINLEVEL_OUTOF10: 4
PAINLEVEL_OUTOF10: 0
PAINLEVEL_OUTOF10: 6
PAINLEVEL_OUTOF10: 0

## 2024-01-17 ASSESSMENT — PAIN DESCRIPTION - LOCATION
LOCATION: BACK
LOCATION: BACK

## 2024-01-17 ASSESSMENT — PAIN DESCRIPTION - ORIENTATION: ORIENTATION: LEFT

## 2024-01-17 ASSESSMENT — PAIN DESCRIPTION - FREQUENCY
FREQUENCY: INTERMITTENT
FREQUENCY: INTERMITTENT

## 2024-01-17 ASSESSMENT — PAIN DESCRIPTION - DESCRIPTORS
DESCRIPTORS: ACHING;DISCOMFORT
DESCRIPTORS: ACHING;DISCOMFORT

## 2024-01-17 ASSESSMENT — PAIN DESCRIPTION - PAIN TYPE
TYPE: ACUTE PAIN;CHRONIC PAIN
TYPE: ACUTE PAIN

## 2024-01-17 NOTE — PROGRESS NOTES
Cardiology - PROGRESS NOTE    Admit Date: 1/11/2024     Reason for follow up:     Per Consult:  69 y.o. female with history of subdural hematoma, hypertension, chronic diastolic heart failure, asthma, obesity who presents with altered mental status of unclear etiology.  On January 14, the patient manifested a new diagnosis of atrial fibrillation with ventricular rates 133 bpm as confirmed on EKG from that date.  Unable to be placed on oral anticoagulants given the history of subdural hematomas.  Was on Toprol- mg daily as outpatient medicine and is still continued inpatient.        Social History:   reports that she has quit smoking. She has a 10.0 pack-year smoking history. She quit smokeless tobacco use about 18 years ago. She reports that she does not drink alcohol and does not use drugs.   Family History: family history is not on file.        Interval History:   Patient seen and examined and notes reviewed     Rate controlled AF   NAD   All other systems reviewed and negative except as above.        Diet: ADULT DIET; Regular; Low Fat/Low Chol/High Fiber/FISH; 1500 ml  Pain is:None  Nausea:None    In: 1312 [P.O.:1312]  Out: 400    Patient Vitals for the past 96 hrs (Last 3 readings):   Weight   01/17/24 0414 78.9 kg (173 lb 15.1 oz)   01/15/24 0652 79.3 kg (174 lb 13.2 oz)           Data:   Scheduled Meds:   Scheduled Meds:   digoxin  125 mcg IntraVENous Once    dilTIAZem  30 mg Oral 4 times per day    digoxin  125 mcg Oral Daily    lidocaine 1 % injection  5 mL IntraDERmal Once    amLODIPine  5 mg Oral Daily    [Held by provider] aspirin  81 mg Oral Daily    vitamin D  50,000 Units Oral Weekly    ferrous sulfate  325 mg Oral Daily with breakfast    pantoprazole  40 mg Oral QAM AC    metoprolol succinate  100 mg Oral Daily    mometasone-formoterol  2 puff Inhalation QAM    montelukast  10 mg Oral Nightly    predniSONE  2 mg Oral Daily    sodium chloride 
    V2.0  [facility] Daily Progress Note      Name:  Malika Rosas /Age/Sex: 1955  (68 y.o. female)   MRN & CSN:  [id number] & [id number] Encounter Date/Time: 2024 1:28 PM EST    Location:  [id number] PCP: Zaida Morocho MD       Hospital Day: 4    Assessment and Plan:   Malika Rosas is a 68 y.o. female with medical history significant for hypertension, hyperlipidemia, chronic diastolic heart failure, bronchial asthma, CIDP, GERD and obesity who was admitted with altered mental status    Assessment/Plan :   Altered mental status likely multifactorial.  -CT head with stable bilateral mixed density subdural hematoma likely chronic as they were also present on CT head of 10/6/2023.  -MRI brain redemonstrated bilateral subdural hematoma.  Some areas of hyperattenuation most likely artifact.  Less likely blood in the subarachnoid space.  -EEG pending.  -Keep on aspiration and fall precaution.  Avoid neuroactive medication.  -Discussed with neuro.  Seems likely patient is a little more sensitive to metabolic disturbances with her previous subdural  -Patient fluctuating even throughout the day  Atrial fibrillation with RVR  -Patient went into A-fib with RVR early a.m. .  Fairly asymptomatic  -Appears to be new diagnosis for patient  -Patient not a good candidate for anticoagulation given her subdural hematomas  -Cardiology consulted for assistance with rate/rhythm control  Bilateral subdural hematoma likely chronic.  -Per neurosurgery no need for surgical intervention.  -Resume antiplatelet when okay with neurosurgery.  Acute on chronic hyponatremia.  -Nephro following  -Slowly improving  Hypokalemia.  -Supplement and monitor  Leukocytosis  -Urinalysis negative  -Chest x-ray without any pneumonia  -Procalcitonin only mildly elevated  -blood culture NGTD  Recurrent fall  -PT OT following, recommended SNF at discharge  Chronic diastolic heart failure, not in exacerbation  -Home 20mg furosemide held for 
    V2.0  [facility] [place]      Name:  Malika Rosas /Age/Sex: 1955  (69 y.o. female)   MRN & CSN:  [id number] & [id number] Encounter Date/Time: 2024 1:28 PM EST    Location:  [id number] PCP: Zaida Morocho MD       Hospital Day: 6    Assessment and Plan:   Malika Rosas is a 69 y.o. female with medical history significant for hypertension, hyperlipidemia, chronic diastolic heart failure, bronchial asthma, CIDP, GERD and obesity who was admitted with altered mental status    Assessment/Plan :   Altered mental status likely multifactorial.  -CT head with stable bilateral mixed density subdural hematoma likely chronic as they were also present on CT head of 10/6/2023.  -MRI brain redemonstrated bilateral subdural hematoma.  Some areas of hyperattenuation most likely artifact.  Less likely blood in the subarachnoid space.  -EEG pending.  -Keep on aspiration and fall precaution.  Avoid neuroactive medication.  -Discussed with neuro.  Seems likely patient is a little more sensitive to metabolic disturbances with her previous subdural  -Patient alert and more aggressive today  -PT OT recommending SNF.  However patient is adamantly refusing SNF.  Given her improvement with therapy, seems possible that she may improved to a level where she may be able to go home with home health while we are working on her heart  Atrial fibrillation with RVR  -Patient went into A-fib with RVR early a.m. .  Fairly asymptomatic  -Appears to be new diagnosis for patient  -Patient not a good candidate for anticoagulation given her subdural hematomas  -Cardiology consulted for assistance with rate/rhythm control.  -Without anticoagulation, patient is limited to rate control  -Continue digoxin  -Diltiazem added today  -Patient will need resting heart rate below 100 prior to discharge  -Patient recommended outpatient stress testing with follow-up in general cardio clinic  Bilateral subdural hematoma likely 
  Arrived to place PICC line with bedside RN. Pre-procedure and timeout done with RN, discussed limitations of placement and allergies.      Pt's basilic, brachial, cephalic are all easily collapsible with no indication for a clot. Vein selected is large enough for catheter. No other vessel large enough for 4f in either arm. Pt tolerated sterile procedure well, with no difficulty accessing right brachial  vein, when accessed - blood was free flowing and non-pulsatile. Guidewire, introducer, and catheter went in smoothly.   PICC line verified with 3CG technology with peaked P-waves (please see image below). PICC tip terminates in the SVC according to [employer] 3CG tip confirmation system. PICC was seen dropping into SVC with tip tracking technology and discernable peaked p waves were noted without negative deflection.     OK to use PICC.   Please use new IV tubing when connecting to the newly placed central line.      Post procedure - reorganized pt table, placed pt in lowest position, with call light and educated on line care. Reported off to bedside RN.      Please call if you have any questions about the PICC or ML. The  will direct you to the PICC RN that is on call.      [phone]              
  Cardiac Electrophysiology Progress Note     Admit Date: 1/11/2024     Reason for follow up: new diagnosis afib with v-rates 130's bpm    HPI: Malika Rosas is a 69 y.o. female with history of subdural hematoma, hypertension, chronic diastolic heart failure, asthma, obesity who presents with altered mental status of unclear etiology.  On January 14, the patient manifested a new diagnosis of atrial fibrillation with ventricular rates 133 bpm as confirmed on EKG from that date.  Unable to be placed on oral anticoagulants given the history of subdural hematomas.  Was on Toprol- mg daily as outpatient medicine and is still continued inpatient.     Interval History: Patient seen and examined. Clinical notes reviewed. Telemetry reviewed. No new complaint today. No major events overnight. Denies having angina, shortness of breath, dyspnea on exertion, Orthopnea, PND at the time of this visit.    Review of Systems   Constitutional:  Negative for chills, fatigue, fever and unexpected weight change.   HENT:  Negative for congestion, hearing loss, sinus pressure, sore throat and trouble swallowing.    Respiratory:  Negative for cough, shortness of breath and wheezing.    Cardiovascular:  Negative for chest pain, palpitations and leg swelling.   Gastrointestinal:  Negative for abdominal pain, blood in stool, constipation, diarrhea, nausea and vomiting.   Genitourinary:  Negative for hematuria.   Musculoskeletal:  Negative for arthralgias, back pain, gait problem and myalgias.   Skin:  Negative for color change, rash and wound.   Neurological:  Negative for dizziness, seizures, syncope, speech difficulty, weakness and light-headedness.   Hematological:  Does not bruise/bleed easily.   All other systems reviewed and are negative.        Vitals:    01/16/24 0809   BP:    Pulse:    Resp:    Temp:    SpO2: 92%        Intake/Output Summary (Last 24 hours) at 1/16/2024 0910  Last data filed at 1/16/2024 0815  Gross per 24 hour 
  NEUROLOGY PROGRESS NOTE  Reason for Consult: Philippe Corley MD asked me to see Malika Rosas in consultation for evaluation of:  AMS    UPDATE 1/16/24:  Patient continued to be a-fib with RVR overnight.  This was a new diagnosis on 1/14/24.  Cannot be anticoagulated d/t hx of bilateral SDH.  Also HAYS and HR in 150's during ambulation. Remains hyponatremic.    Today patient is complaining of a frontal headache. She states Tylenol helps \"but not forever.\"  States she will refuse EEG because she's had one in the past.  She wants to go home and is angry that rehab has been recommended. She brushed her teeth throughout the entire exam.      There was no family at the bedside at the time of encounter.    MEDICAL HISTORY:  Past Medical History:   Diagnosis Date    Asthma     Chronic inflammatory demyelinating polyneuritis (HCC) 8/18/2017    Diastolic dysfunction     due to polyneuropathy    GERD (gastroesophageal reflux disease) 7/29/2014    Hypertension     Hypertriglyceridemia 7/23/2010    Neuropathy, peroneal nerve 3/15/2010    Obesity     Polyneuropathy     Sacral insufficiency fracture 05/2017    Spondylolisthesis, grade 1 05/2017    Grade 1 isthmus spondylolithesis with high grade foraminal stenosis at L5     Past Surgical History:   Procedure Laterality Date    FOOT SURGERY      right foot    GASTRIC BYPASS SURGERY  01/14/2019    NERVE BIOPSY      out of right ankle    TONSILLECTOMY       Scheduled Meds:   digoxin  125 mcg IntraVENous Once    digoxin  125 mcg Oral Daily    lidocaine 1 % injection  5 mL IntraDERmal Once    amLODIPine  5 mg Oral Daily    [Held by provider] aspirin  81 mg Oral Daily    vitamin D  50,000 Units Oral Weekly    ferrous sulfate  325 mg Oral Daily with breakfast    pantoprazole  40 mg Oral QAM AC    metoprolol succinate  100 mg Oral Daily    mometasone-formoterol  2 puff Inhalation QAM    montelukast  10 mg Oral Nightly    predniSONE  2 mg Oral Daily    sodium chloride flush  5-40 mL 
  NEUROLOGY PROGRESS NOTE (Chart check only)  Reason for Consult: Wesley Madrid MD asked me to see Malika Rosas in consultation for evaluation of:  AMS    UPDATE 1/17/24:  Chart thoroughly reviewed including progress notes, labs, imaging results, and other diagnostic testing.  Labs updated when appropriate.      No acute events recorded overnight.  Patient was scheduled for EEG yesterday but refused.  Nephrology is following for hyponatremia, which is now resolved.  EP is following for new diagnosis of a-fib with RVR.    MEDICAL HISTORY:  Past Medical History:   Diagnosis Date    Asthma     Chronic inflammatory demyelinating polyneuritis (HCC) 8/18/2017    Diastolic dysfunction     due to polyneuropathy    GERD (gastroesophageal reflux disease) 7/29/2014    Hypertension     Hypertriglyceridemia 7/23/2010    Neuropathy, peroneal nerve 3/15/2010    Obesity     Polyneuropathy     Sacral insufficiency fracture 05/2017    Spondylolisthesis, grade 1 05/2017    Grade 1 isthmus spondylolithesis with high grade foraminal stenosis at L5     Past Surgical History:   Procedure Laterality Date    FOOT SURGERY      right foot    GASTRIC BYPASS SURGERY  01/14/2019    NERVE BIOPSY      out of right ankle    TONSILLECTOMY       Scheduled Meds:   digoxin  125 mcg IntraVENous Once    dilTIAZem  30 mg Oral 4 times per day    digoxin  125 mcg Oral Daily    lidocaine 1 % injection  5 mL IntraDERmal Once    amLODIPine  5 mg Oral Daily    [Held by provider] aspirin  81 mg Oral Daily    vitamin D  50,000 Units Oral Weekly    ferrous sulfate  325 mg Oral Daily with breakfast    pantoprazole  40 mg Oral QAM AC    metoprolol succinate  100 mg Oral Daily    mometasone-formoterol  2 puff Inhalation QAM    montelukast  10 mg Oral Nightly    predniSONE  2 mg Oral Daily    sodium chloride flush  5-40 mL IntraVENous 2 times per day     Continuous Infusions:   sodium chloride 50 mL/hr at 01/16/24 1600    sodium chloride       PRN 
  [facility]  Phone: [phone]  Fax: [phone]  [employer]         Reason for Consult:  Hyponatremia  Requesting Physician:  Dr. Bowers    History Obtained From:  patient, electronic medical record    History of Present Ilness: 67 y/o WF with h/o HTN CHF and bilateral SDH in 10/23 She was seen in 10/23 for Hyponatremia Na+ was 116 meq at that time Deemed to be due to poor oral intake and diuretic Use Na+ improved to 135 meq in 10/23    Now admitted with AMS, found on the floor. She is a poor historian. Per records she has been declining since X-mas. Noted to have Na+ 128 meq. CT showed stable bilateral SDH, no mass effect or midline shifts. She has been on Lasix. No NSAID use     Review of Systems: no complaints - wanting to go home    Physical exam:   Constitutional:  VITALS:  /75   Pulse (!) 131   Temp 97.3 °F (36.3 °C) (Oral)   Resp 16   Ht 1.6 m (5' 3\")   Wt 79.3 kg (174 lb 13.2 oz)   SpO2 92%   BMI 30.97 kg/m²     Intake/Output Summary (Last 24 hours) at 1/16/2024 1434  Last data filed at 1/16/2024 1250  Gross per 24 hour   Intake 1106 ml   Output 400 ml   Net 706 ml         Gen: oriented X3 but answering slowly   Heent: normocephalic, atraumatic  Cardiovascular:  RRR  Respiratory: CTA B; respiratory effort normal  Abdomen:  +bs, soft, nt, nd  Ext: no lower extremity edema    Medications:   digoxin  125 mcg IntraVENous Once    dilTIAZem  30 mg Oral 4 times per day    digoxin  125 mcg Oral Daily    lidocaine 1 % injection  5 mL IntraDERmal Once    amLODIPine  5 mg Oral Daily    [Held by provider] aspirin  81 mg Oral Daily    vitamin D  50,000 Units Oral Weekly    ferrous sulfate  325 mg Oral Daily with breakfast    pantoprazole  40 mg Oral QAM AC    metoprolol succinate  100 mg Oral Daily    mometasone-formoterol  2 puff Inhalation QAM    montelukast  10 mg Oral Nightly    predniSONE  2 mg Oral Daily    sodium chloride flush  5-40 mL IntraVENous 2 times per day 
  [facility]  Phone: [phone]  Fax: [phone]  [employer]         Reason for Consult:  Hyponatremia  Requesting Physician:  Dr. Bowers    History Obtained From:  patient, electronic medical record    History of Present Ilness: 69 y/o WF with h/o HTN CHF and bilateral SDH in 10/23 She was seen in 10/23 for Hyponatremia Na+ was 116 meq at that time Deemed to be due to poor oral intake and diuretic Use Na+ improved to 135 meq in 10/23    Now admitted with AMS, found on the floor. She is a poor historian. Per records she has been declining since X-mas. Noted to have Na+ 128 meq. CT showed stable bilateral SDH, no mass effect or midline shifts. She has been on Lasix. No NSAID use     Review of Systems: no complaints - wanting to go home    Physical exam:   Constitutional:  VITALS:  BP (!) 146/65   Pulse 99   Temp 97.7 °F (36.5 °C) (Oral)   Resp 18   Ht 1.6 m (5' 3\")   Wt 78.9 kg (173 lb 15.1 oz)   SpO2 93%   BMI 30.81 kg/m²     Intake/Output Summary (Last 24 hours) at 1/17/2024 1502  Last data filed at 1/16/2024 1841  Gross per 24 hour   Intake 596 ml   Output --   Net 596 ml         Gen: oriented X3   Heent: normocephalic, atraumatic  Cardiovascular:  RRR  Respiratory: CTA B; respiratory effort normal  Abdomen:  +bs, soft, nt, nd  Ext: no lower extremity edema    Medications:   digoxin  125 mcg IntraVENous Once    dilTIAZem  30 mg Oral 4 times per day    digoxin  125 mcg Oral Daily    lidocaine 1 % injection  5 mL IntraDERmal Once    amLODIPine  5 mg Oral Daily    [Held by provider] aspirin  81 mg Oral Daily    vitamin D  50,000 Units Oral Weekly    ferrous sulfate  325 mg Oral Daily with breakfast    pantoprazole  40 mg Oral QAM AC    metoprolol succinate  100 mg Oral Daily    mometasone-formoterol  2 puff Inhalation QAM    montelukast  10 mg Oral Nightly    predniSONE  2 mg Oral Daily    sodium chloride flush  5-40 mL IntraVENous 2 times per day         Data/  CBC:   Lab Results 
  [facility]  Phone: [phone]  Fax: [phone]  [employer]         Reason for Consult:  Hyponatremia  Requesting Physician:  Dr. Bowers    History Obtained From:  patient, electronic medical record    History of Present Ilness: 69 y/o WF with h/o HTN CHF and bilateral SDH in 10/23 She was seen in 10/23 for Hyponatremia Na+ was 116 meq at that time Deemed to be due to poor oral intake and diuretic Use Na+ improved to 135 meq in 10/23    Now admitted with AMS, found on the floor. She is a poor historian. Per records she has been declining since X-mas. Noted to have Na+ 128 meq. CT showed stable bilateral SDH, no mass effect or midline shifts. She has been on Lasix. No NSAID use     Review of Systems: no complaints - wanting to go home    Physical exam:   Constitutional:  VITALS:  BP 96/60   Pulse (!) 117   Temp 97.4 °F (36.3 °C) (Oral)   Resp 16   Ht 1.6 m (5' 3\")   Wt 79.3 kg (174 lb 13.2 oz)   SpO2 93%   BMI 30.97 kg/m²     Intake/Output Summary (Last 24 hours) at 1/15/2024 1247  Last data filed at 1/15/2024 1212  Gross per 24 hour   Intake 1560 ml   Output 3100 ml   Net -1540 ml       Gen: oriented X3 but answering slowly   Heent: normocephalic, atraumatic  Cardiovascular:  RRR  Respiratory: CTA B; respiratory effort normal  Abdomen:  +bs, soft, nt, nd  Ext: no lower extremity edema    Medications:   lidocaine 1 % injection  5 mL IntraDERmal Once    amLODIPine  5 mg Oral Daily    [Held by provider] aspirin  81 mg Oral Daily    vitamin D  50,000 Units Oral Weekly    ferrous sulfate  325 mg Oral Daily with breakfast    pantoprazole  40 mg Oral QAM AC    metoprolol succinate  100 mg Oral Daily    mometasone-formoterol  2 puff Inhalation QAM    montelukast  10 mg Oral Nightly    predniSONE  2 mg Oral Daily    sodium chloride flush  5-40 mL IntraVENous 2 times per day         Data/  CBC:   Lab Results   Component Value Date/Time    WBC 12.0 01/15/2024 06:04 AM    RBC 3.13 01/15/2024 
  [facility]  Phone: [phone]  Fax: [phone]  [employer]         Reason for Consult:  Hyponatremia  Requesting Physician:  Dr. Bowers    History Obtained From:  patient, electronic medical record    History of Present Ilness: 67 y/o WF with h/o HTN CHF and bilateral SDH in 10/23 She was seen in 10/23 for Hyponatremia Na+ was 116 meq at that time Deemed to be due to poor oral intake and diuretic Use Na+ improved to 135 meq in 10/23  No\w admitted with AMS , found on the floor  She is a poor historian Per records she has been declining since X-mas   She denies falls, difficulty walking Reports good appetite  No h/o nausea vomiting Denies ETOH use recently  Noted to have Na+ 128 meq  CT showed stable bilateral SDH, no mass effect or midline shifts  She has been on Lasix No NSAID use     Review of Systems: awake slow to answer   Denies nausea SOB   MRI findings noted     Physical exam:   Constitutional:  VITALS:  /72   Pulse 94   Temp 98.1 °F (36.7 °C) (Oral)   Resp 24   Ht 1.6 m (5' 3\")   Wt 74.7 kg (164 lb 10.9 oz)   SpO2 93%   BMI 29.17 kg/m²   Gen: oriented X3 but answering slowly   Skin: no rash, turgor decreased  Heent:  eomi, mm dry   Neck: no bruits or jvd noted, thyroid normal  Cardiovascular:  S1, S2 without m/r/g  Respiratory: CTA B without w/r/r; respiratory effort normal  Abdomen:  +bs, soft, nt, nd, no hepatosplenomegaly  Ext: no lower extremity edema        Data/  CBC:   Lab Results   Component Value Date/Time    WBC 13.3 01/12/2024 04:42 AM    RBC 3.70 01/12/2024 04:42 AM    HGB 10.2 01/12/2024 04:42 AM    HCT 30.8 01/12/2024 04:42 AM    MCV 83.2 01/12/2024 04:42 AM    MCH 27.5 01/12/2024 04:42 AM    MCHC 33.1 01/12/2024 04:42 AM    RDW 14.2 01/12/2024 04:42 AM     01/12/2024 04:42 AM    MPV 8.6 01/12/2024 04:42 AM     BMP:    Lab Results   Component Value Date/Time     01/14/2024 11:30 AM    K 4.3 01/14/2024 05:55 AM    CL 95 01/14/2024 05:55 AM 
  [facility]  Phone: [phone]  Fax: [phone]  [employer]         Reason for Consult:  Hyponatremia  Requesting Physician:  Dr. Bowers    History Obtained From:  patient, electronic medical record    History of Present Ilness: 69 y/o WF with h/o HTN CHF and bilateral SDH in 10/23 She was seen in 10/23 for Hyponatremia Na+ was 116 meq at that time Deemed to be due to poor oral intake and diuretic Use Na+ improved to 135 meq in 10/23  No\w admitted with AMS , found on the floor  She is a poor historian Per records she has been declining since X-mas   She denies falls, difficulty walking Reports good appetite  No h/o nausea vomiting Denies ETOH use recently  Noted to have Na+ 128 meq  CT showed stable bilateral SDH, no mass effect or midline shifts  She has been on Lasix No NSAID use     Review of Systems: awake slowed mentation noted   Denies nausea SOB   Has bene intermittently confused     Physical exam:   Constitutional:  VITALS:  /78   Pulse (!) 105   Temp 97.9 °F (36.6 °C) (Oral)   Resp 18   Ht 1.6 m (5' 3\")   Wt 74.7 kg (164 lb 10.9 oz)   SpO2 96%   BMI 29.17 kg/m²   Gen: oriented X3 but answering slowly   Skin: no rash, turgor decreased  Heent:  eomi, mm dry   Neck: no bruits or jvd noted, thyroid normal  Cardiovascular:  S1, S2 without m/r/g  Respiratory: CTA B without w/r/r; respiratory effort normal  Abdomen:  +bs, soft, nt, nd, no hepatosplenomegaly  Ext: no lower extremity edema  Psychiatric: oriented X 3 Answering questions but slower to answer       Data/  CBC:   Lab Results   Component Value Date/Time    WBC 13.3 01/12/2024 04:42 AM    RBC 3.70 01/12/2024 04:42 AM    HGB 10.2 01/12/2024 04:42 AM    HCT 30.8 01/12/2024 04:42 AM    MCV 83.2 01/12/2024 04:42 AM    MCH 27.5 01/12/2024 04:42 AM    MCHC 33.1 01/12/2024 04:42 AM    RDW 14.2 01/12/2024 04:42 AM     01/12/2024 04:42 AM    MPV 8.6 01/12/2024 04:42 AM     BMP:    Lab Results   Component Value 
4 Eyes Skin Assessment     NAME:  Malika Rosas  YOB: 1955  MEDICAL RECORD NUMBER:  [id number]    The patient is being assess for  Admission    I agree that 2 RN's have performed a thorough Head to Toe Skin Assessment on the patient. ALL assessment sites listed below have been assessed.      Areas assessed by both nurses:    Head, Face, Ears, Shoulders, Back, Chest, Arms, Elbows, Hands, Sacrum. Buttock, Coccyx, Ischium, and Legs. Feet and Heels        Does the Patient have a Wound? No noted wound(s)       [place] Prevention initiated:  NA   Wound Care Orders initiated:  NA    Pressure Injury (Stage 3,4, Unstageable, DTI, NWPT, and Complex wounds) if present place consult order under :: NA    New and Established Ostomies if present place consult order under : NA      Nurse 1 eSignature: Electronically signed by Esperanza Santacruz RN on 1/12/24 at 3:04 AM EST    **SHARE this note so that the co-signing nurse is able to place an eSignature**    Nurse 2 eSignature: Electronically signed by Baldo Santiago RN on 1/12/24 at 6:12 AM EST         
CLINICAL PHARMACY NOTE: MEDS TO BEDS      Current Discharge Medication List        START taking these medications    Details   dilTIAZem (CARDIZEM CD) 120 MG extended release capsule Take 1 capsule by mouth daily  Qty: 30 capsule, Refills: 0      digoxin (LANOXIN) 125 MCG tablet Take 1 tablet by mouth daily  Qty: 15 tablet, Refills: 0               Additional Documentation: Both prescriptions transferred to [facility] in [place] per pt request    
Call from [facility]. Patient converted to A-Fib rhythm this morning from the previous sinus tachycardia. Patient asymptomatic. Denies any discomfort. VSS. Rasheeda Pizarro notified via secure message. EKG ordered.  at bedside. EKG seen. No new orders. Care on going.  
Educated pt on the new medication, Cardizem, the MD ordered for her elevated heart rate. Gave pt medication education paperwork. Answered all questions from pt.   
Neurology    Brain MRI reviewed.  Management of findings per neurosurgery.      Dicussed w/ hospitalist yesterday.  We are awaiting EEG.     Call w/ any urgent questions or concerns.  Thank you.     Robin Ku NP  [facility]  
No acute changes overnight. Remains confused.Patient had a calm night. VSS. Bed in lowest position with wheels locked and bed alarm on. Call light and bedside table within reach. Care on going.  
Occupational Therapy  Facility/Department: [address] MED SURG  Occupational Therapy Daily Note    Name: Malika Rosas  : 1955  MRN: [id number]  Date of Service: 1/15/2024    Discharge Recommendations:  3-5 sessions per week, Patient would benefit from continued therapy after discharge   Malika Rosas scored a 16/24 on the AM-PAC ADL Inpatient form. Current research shows that an AM-PAC score of 17 or less is typically not associated with a discharge to the patient's home setting. Based on the patient's AM-PAC score and their current ADL deficits, it is recommended that the patient have 3-5 sessions per week of Occupational Therapy at d/c to increase the patient's independence.  Please see assessment section for further patient specific details.    If patient discharges prior to next session this note will serve as a discharge summary.  Please see below for the latest assessment towards goals.         Patient Diagnosis(es): The primary encounter diagnosis was Altered mental status, unspecified altered mental status type. Diagnoses of Subdural hematoma (HCC), Hyponatremia, and Thrombocytosis were also pertinent to this visit.  Past Medical History:  has a past medical history of Asthma, Chronic inflammatory demyelinating polyneuritis (HCC), Diastolic dysfunction, GERD (gastroesophageal reflux disease), Hypertension, Hypertriglyceridemia, Neuropathy, peroneal nerve, Obesity, Polyneuropathy, Sacral insufficiency fracture, and Spondylolisthesis, grade 1.  Past Surgical History:  has a past surgical history that includes Nerve Biopsy; Tonsillectomy; Foot surgery; and Gastric bypass surgery (2019).    Treatment Diagnosis: Decreased: ADLs, functional transfers/mobility      Assessment   Performance deficits / Impairments: Decreased functional mobility ;Decreased ADL status;Decreased strength;Decreased endurance;Decreased cognition;Decreased safe awareness;Decreased balance;Decreased high-level IADLs  Assessment: 
PRN medication given per order for potassium of 3.3.   
Patient alert and oriented to person and time. Cannot say where she is at or why. Continues to be tachycardic otherwise VSS on room air. Denies pain. Morning medications administered without complication. Patient repositioned and boosted in bed. Urine sample sent. [facility] called this RN stating patient continues to be in Afib with HR up to 170s. Patient asymptomatic. Day shift MD updated via perfect serve. Patient in bed in locked and lowest position with bed alarm on. Call light within reach. No other needs stated at this time.   
Patient alert and oriented x3 this morning. Able to state he name and birthday, the year, and that she is at [facility]. However PT/OT states she was unable to answer their questions. VSS on room air. Denies pain. Took morning medications without complications. Up in chair eating breakfast with chair alarm on. Call light within reach. No other needs stated at this time.   
Patient blood pressure 84/59 and heart rate 102 this AM. Patient confused and uncooperative.   Blood pressure medicines and lasix held per MD order.   Patient provided with rest of the medicines, crushed in apple sauce.     Electronically signed by Stella Melara RN on 1/12/2024 at 9:12 AM    
Patient continues to be alert and oriented x4 all day with appropriate conversation. Does have some delayed speech and sometimes cannot find the right word say. Took patient to bathroom then back to bed with walker without complication. In bed eating dinner with bed alarm on. Call light within reach. Tele camera removed at this time. No other needs stated at this time. Electronically signed by Kerri Goodson RN on 1/15/2024 at 6:46 PM   
Patient fully alert and oriented this morning. Knows that she is in the hospital for confusion. VSS on room air. Complaining of chronic neuropathic pain in arms and legs. Asking for her home PO dose of dilaudid. MD notified via perfect serve. Patient sitting in bed eating breakfast. Bed in lowest position with bed alarm on. Call light within reach. Telecamera in room. No other needs stated at this time.   
Patient on unit from ER. Assessments complete and patient able to answer most questions. Patient unable to answer social determinants of health questions and was unable to recall what home medications she was on. Patient calm and cooperative. Patient is alert and oriented to self only. Patient aware that she is in a hospital but does not know where and does not know why she is here. Call light within reach, bed alarm on and bed in lowest position.   
Patient to bathroom x1 with walker. HR did increase with movement. Patient had BM. Patient independent in [facility]. Pull-up brief put on. Patient back in chair. Visitor at bedside. Afternoon medications administered. Call light within reach. No other needs stated at this time.   
Physical Therapy  Facility/Department: [address] MED SURG  Physical Therapy Daily Note  If patient discharges prior to next session this note will serve as a discharge summary.  Please see below for the latest assessment towards goals.     Name: Malika Rosas  : 1955  MRN: [id number]  Date of Service: 2024    Discharge Recommendations:  Patient would benefit from continued therapy after discharge (3-5 vs home with 24/7 assist and home PT if pt refuses 3-5)   Malika Rosas scored a 17/24 on the AM-PAC short mobility form. Current research shows that an AM-PAC score of 17 or less is typically not associated with a discharge to the patient's home setting. Based on the patient's AM-PAC score and their current functional mobility deficits, it is recommended that the patient have 3-5 sessions per week of Physical Therapy at d/c to increase the patient's independence.  Please see assessment section for further patient specific details.  PT Equipment Recommendations  Equipment Needed: No  Other: defer      Patient Diagnosis(es): The primary encounter diagnosis was Altered mental status, unspecified altered mental status type. Diagnoses of Subdural hematoma (HCC), Hyponatremia, and Thrombocytosis were also pertinent to this visit.  Past Medical History:  has a past medical history of Asthma, Chronic inflammatory demyelinating polyneuritis (HCC), Diastolic dysfunction, GERD (gastroesophageal reflux disease), Hypertension, Hypertriglyceridemia, Neuropathy, peroneal nerve, Obesity, Polyneuropathy, Sacral insufficiency fracture, and Spondylolisthesis, grade 1.  Past Surgical History:  has a past surgical history that includes Nerve Biopsy; Tonsillectomy; Foot surgery; and Gastric bypass surgery (2019).    Assessment   Assessment: Today, the pt demonstrated some improvement but con't to be limited by her generalized weakness, decreased balance and decreased activity tolerance. Her cognition is a little better but 
Physical Therapy  Facility/Department: [address] MED SURG  Physical Therapy Daily Note  If patient discharges prior to next session this note will serve as a discharge summary.  Please see below for the latest assessment towards goals.   Name: Malika Rosas  : 1955  MRN: [id number]  Date of Service: 1/15/2024    Discharge Recommendations:  Patient would benefit from continued therapy after discharge (3-5)   Malika Rosas scored a 16/24 on the AM-PAC short mobility form. Current research shows that an AM-PAC score of 17 or less is typically not associated with a discharge to the patient's home setting. Based on the patient's AM-PAC score and their current functional mobility deficits, it is recommended that the patient have 3-5 sessions per week of Physical Therapy at d/c to increase the patient's independence.  Please see assessment section for further patient specific details.  PT Equipment Recommendations  Other: defer      Patient Diagnosis(es): The primary encounter diagnosis was Altered mental status, unspecified altered mental status type. Diagnoses of Subdural hematoma (HCC), Hyponatremia, and Thrombocytosis were also pertinent to this visit.  Past Medical History:  has a past medical history of Asthma, Chronic inflammatory demyelinating polyneuritis (HCC), Diastolic dysfunction, GERD (gastroesophageal reflux disease), Hypertension, Hypertriglyceridemia, Neuropathy, peroneal nerve, Obesity, Polyneuropathy, Sacral insufficiency fracture, and Spondylolisthesis, grade 1.  Past Surgical History:  has a past surgical history that includes Nerve Biopsy; Tonsillectomy; Foot surgery; and Gastric bypass surgery (2019).    Assessment   Assessment: Today, the pt demonstrated some improvement but con't to be limited by her generalized weakness, decreased balance and decreased activity tolerance. Her cognition is a little better but con't to be somewhat delayed and needs re-direction. The pt is needing at 
Physical Therapy  Treatment Attempt    24    Name: Malika Rosas   : 1955    MRN: [id number]    Patient unable to be seen by PT as patient refuses participation at this time, states that she is \"not feeling well\" right now. C/o phlegm in her chest that has been having difficulty getting up. PT educated about the importance of mobility, especially with current symptoms to assist lung function. Pt continues to decline. Will monitor and check back tomorrow as schedule allows.    If patient discharges prior to next session, please refer to previous PT note for d/c summary.    Electronically signed by Erik Webster PT on 2024 at 10:26 AM        
Pt has been in A-Fib RVR throughout the night. NP was notified and an EKG was ordered and obtained . Results were A-Fib RVR. NP also ordered Digoxin IVP. Informed her that we are not able to give IVP on this unit. This order is still active. Has been complaining of headaches . Becomes dyspneic on exertion and heart rate jumps to 150's when ambulating. Report given to oncoming RN .   
Pt is scheduled to have her EEG at 11 am today. Pt is refusing test, she states she recently had one done and it came back normal. Educated pt on the impoerance of having test completed but pt still refusing. MD made aware.   
Removed pt PICC line, applied a petroleum and gauze dressing and held pressure for 5 minutes. Covered dressing with Tegaderm. Instructed pt to lay still for 30 minutes. Pt states understanding. Will continue to monitor.   
Reviewed discharge instructions and answered all questions from pt and family. Pt gathered belongings and was taken down for discharge home.   
Tele camera placed in room at this time due to patient getting out of bed without calling first. Intermittently confused and doesn't not have safety awareness to press call light.   
IADLs  Assessment: Pt is a 69 yo F admitted with altered mental status from home. Of note, pt sustained a fall in October, 2023 resulting in bilateral subdural hematoma and completed rehab stay before returning home. Pt unable to provide baseline since leaving [facility]U, but she was previously living alone in a house and independent w/ use of RW. She is below baseline at this time, most limited by cognition - delayed processing, decreased command following, decreased ability to initiate and sequence tasks. She required up to mod A for bed mobility and completed seated grooming w/ setup/SBA and max cueing. Anticipate up to max A for full ADL. Will continue to follow while hospitalized. Anticipate pt with need for ongoing skilled OT 3-5x/wk at d/c to improve her safety, independence, and promote return to PLOF.  Treatment Diagnosis: Decreased: ADLs, functional transfers/mobility  Prognosis: Good  Decision Making: Medium Complexity  REQUIRES OT FOLLOW-UP: Yes  Activity Tolerance  Activity Tolerance: Treatment limited secondary to decreased cognition        Plan   Occupational Therapy Plan  Times Per Week: 3-5  Times Per Day: Once a day  Current Treatment Recommendations: Strengthening, ROM, Balance training, Functional mobility training, Endurance training, Equipment evaluation, education, & procurement, Patient/Caregiver education & training, Self-Care / ADL, Safety education & training     Restrictions  Restrictions/Precautions  Restrictions/Precautions: Fall Risk  Position Activity Restriction  Other position/activity restrictions: IV, pure-wick    Subjective   General  Chart Reviewed: Yes  Patient assessed for rehabilitation services?: Yes  Additional Pertinent Hx: Pt is a 69 yo F who presented w/ altered mental status. Family reports her mental status has been declining since Jamir. Of note, pt was found on the floor of her home 10/23, CT head at that time revealed bilateral subdural hematoma and pt completed rehab 
Or  acetaminophen, 650 mg, Q6H PRN  labetalol, 20 mg, Q6H PRN      Labs      Recent Results (from the past 24 hour(s))   Sodium, Urine, Random    Collection Time: 01/14/24  8:40 AM   Result Value Ref Range    Sodium, Ur 43 Not Established mmol/L   Osmolality, Urine    Collection Time: 01/14/24  8:40 AM   Result Value Ref Range    Osmolality, Ur 250 (L) 390 - 1070 mOsm/kg   Sodium    Collection Time: 01/14/24 11:30 AM   Result Value Ref Range    Sodium 131 (L) 136 - 145 mmol/L   Sodium    Collection Time: 01/14/24  6:16 PM   Result Value Ref Range    Sodium 126 (L) 136 - 145 mmol/L   Sodium    Collection Time: 01/14/24 11:50 PM   Result Value Ref Range    Sodium 129 (L) 136 - 145 mmol/L   Basic Metabolic Panel w/ Reflex to MG    Collection Time: 01/15/24  6:04 AM   Result Value Ref Range    Sodium 131 (L) 136 - 145 mmol/L    Potassium reflex Magnesium 3.8 3.5 - 5.1 mmol/L    Chloride 96 (L) 99 - 110 mmol/L    CO2 25 21 - 32 mmol/L    Anion Gap 10 3 - 16    Glucose 95 70 - 99 mg/dL    BUN 16 7 - 20 mg/dL    Creatinine 0.6 0.6 - 1.2 mg/dL    Est, Glom Filt Rate >60 >60    Calcium 9.0 8.3 - 10.6 mg/dL   CBC with Auto Differential    Collection Time: 01/15/24  6:04 AM   Result Value Ref Range    WBC 12.0 (H) 4.0 - 11.0 K/uL    RBC 3.13 (L) 4.00 - 5.20 M/uL    Hemoglobin 8.7 (L) 12.0 - 16.0 g/dL    Hematocrit 26.1 (L) 36.0 - 48.0 %    MCV 83.3 80.0 - 100.0 fL    MCH 27.8 26.0 - 34.0 pg    MCHC 33.3 31.0 - 36.0 g/dL    RDW 14.3 12.4 - 15.4 %    Platelets 513 (H) 135 - 450 K/uL    MPV 8.8 5.0 - 10.5 fL    Neutrophils % 83.1 %    Lymphocytes % 4.6 %    Monocytes % 8.8 %    Eosinophils % 2.9 %    Basophils % 0.6 %    Neutrophils Absolute 9.9 (H) 1.7 - 7.7 K/uL    Lymphocytes Absolute 0.6 (L) 1.0 - 5.1 K/uL    Monocytes Absolute 1.1 0.0 - 1.3 K/uL    Eosinophils Absolute 0.3 0.0 - 0.6 K/uL    Basophils Absolute 0.1 0.0 - 0.2 K/uL        Imaging/Diagnostics Last 24 Hours   CT HEAD WO CONTRAST    Result Date: 
Verbal;Demonstration  Barriers to Learning: Cognition  Education Outcome: Continued education needed      Therapy Time   Individual Concurrent Group Co-treatment   Time In 0835         Time Out 0916         Minutes 41         Timed Code Treatment Minutes: 26 Minutes     Electronically signed by Diana Roblero, PT [id number] on 1/13/2024 at 9:35 AM        
acute abnormality. SOFT TISSUES/SKULL:  No acute abnormality involving the visualized skull or soft tissues. CT SCAN CERVICAL SPINE: BONES/ALIGNMENT: The alignment of the cervical spine is within normal limits. No acute fractures or dislocations are seen. DEGENERATIVE CHANGES: There is mild degenerative disc disease of the cervical spine. SOFT TISSUES: There is no prevertebral soft tissue swelling.     1. Small bilateral mixed density subdural hematomas over the cerebral convexities as described above with no mass-effect or midline shift.  These have the appearance of subacute to chronic hematomas. 2. No acute osseous abnormality of the cervical spine.     XR CHEST PORTABLE    Result Date: 1/11/2024  EXAMINATION: ONE XRAY VIEW OF THE CHEST 1/11/2024 7:07 pm COMPARISON: October 6, 2023 HISTORY: ORDERING SYSTEM PROVIDED HISTORY: fall TECHNOLOGIST PROVIDED HISTORY: Reason for exam:->fall Reason for Exam: fall FINDINGS: The cardiomediastinal silhouette is mildly enlarged, stable. The lungs are clear. No pleural effusion or pneumothorax is present. No evidence of subdiaphragmatic free air or pneumomediastinum.     No acute cardiopulmonary process. Mild cardiomegaly, stable.       Electronically signed by Philippe Montoya MD on 1/13/2024 at 7:42 AM  
iterative reconstruction, and/or weight based adjustment of the mA/kV was utilized to reduce the radiation dose to as low as reasonably achievable. COMPARISON: None. HISTORY: ORDERING SYSTEM PROVIDED HISTORY: trauma, r/o fx TECHNOLOGIST PROVIDED HISTORY: Reason for exam:->trauma, r/o fx Reason for Exam: trauma, r/o fx FINDINGS: CT SCAN HEAD: BRAIN/VENTRICLES: There are bilateral mixed density subdural hematomas over the cerebral convexities.  The maximum thickness on the right measures 7.7 mm and on the left measures 5.7 mm.  No mass-effect or midline shift is seen. Majority of the hematomas are low density.  The gray-white differentiation is maintained without evidence of an acute infarct.  There is no evidence of hydrocephalus. ORBITS: The visualized portion of the orbits demonstrate no acute abnormality. SINUSES: The visualized paranasal sinuses and mastoid air cells demonstrate no acute abnormality. SOFT TISSUES/SKULL:  No acute abnormality involving the visualized skull or soft tissues. CT SCAN CERVICAL SPINE: BONES/ALIGNMENT: The alignment of the cervical spine is within normal limits. No acute fractures or dislocations are seen. DEGENERATIVE CHANGES: There is mild degenerative disc disease of the cervical spine. SOFT TISSUES: There is no prevertebral soft tissue swelling.     1. Small bilateral mixed density subdural hematomas over the cerebral convexities as described above with no mass-effect or midline shift.  These have the appearance of subacute to chronic hematomas. 2. No acute osseous abnormality of the cervical spine.     XR CHEST PORTABLE    Result Date: 1/11/2024  EXAMINATION: ONE XRAY VIEW OF THE CHEST 1/11/2024 7:07 pm COMPARISON: October 6, 2023 HISTORY: ORDERING SYSTEM PROVIDED HISTORY: fall TECHNOLOGIST PROVIDED HISTORY: Reason for exam:->fall Reason for Exam: fall FINDINGS: The cardiomediastinal silhouette is mildly enlarged, stable. The lungs are clear. No pleural effusion or pneumothorax is

## 2024-01-17 NOTE — CARE COORDINATION
1/16 Chart reviewed. Therapy is recommending that patient go to a SNF for additional therapy prior to returning to home. Discussed with patient and she is refusing this plan. She states that she is not going \"anywhere but home\". Electronically signed by Shivani Barcenas RN on 1/16/2024 at 8:36 AM    
1/17 Call placed to patient's cousin Monet [phone] regarding discharge planning barriers. Therapy is recommending a SNF however the patient is refusing this. Monet states that she has no one who could stay with her because she works. She has one other cousin and a friend but neither of them could provide any extended care for her. They can assist with small things like groceries. MICHAEL will make a referral to the [facility] when she is discharged. It is not clear whether she will participate with this plan. Monet is able to transport her to home after work, in the evening hours.Electronically signed by Shivani Barcenas RN on 1/17/2024 at 8:48 AM    
1/17 The Plan for Transition of Care is related to the following treatment goals: to return to home.    The Patient and/or patient representative Monet Deshawn was provided with a choice of provider and agrees   with the discharge plan. [x] Yes [] No    Freedom of choice list was provided with basic dialogue that supports the patient's individualized plan of care/goals, treatment preferences and shares the quality data associated with the providers. [x] Yes [] No.    Referral sent to [facility]. No agency preference.Electronically signed by Shivani Barcenas RN on 1/17/2024 at 2:26 PM    
[facility]    Referral received from CM to follow for home care services.   [facility] unable to staff timely, will require alternate agency, no preference, CM aware.     Referral accepted by Kenyatta with [facility], will pull from epic.  Gave Kenyatta correct contact info for scheduling.      Chris Cisse RN, BSN CTN  [facility] [phone]   
DISCHARGE SUMMARY     DATE OF DISCHARGE: 1/17/24    DISCHARGE DESTINATION: Home    HOME CARE: Yes    Discharging to Facility/ Agency   Name: [facility]  Address:   [address] [place] [address]  Phone:  [phone]  Fax:  [phone]     HEMODIALYSIS: No    TRANSPORTATION: Private Car    NEW DME ORDERED: no    COMMENTS: A [facility] referral has been sent to the [facility]. Patient's cousin Monet will transport to home.Electronically signed by Shivani Barcenas RN on 1/17/2024 at 2:44 PM    
members/significant others, and if so, who? Yes (Monet is Cousin-Next of Kin)  Plans to Return to Present Housing: Unknown at present  Other Identified Issues/Barriers to RETURNING to current housing: Unable to assess  Potential Assistance needed at discharge: Skilled Nursing Facility            Potential DME: TBD   Patient expects to discharge to: Unknown  Plan for transportation at discharge: Other (see comment) (TBD)    Financial    Payor: [facility] MEDICARE / Plan: ANTHEM MEDIBLUE ESSENTIAL/PLUS / Product Type: *No Product type* /     Does insurance require precert for SNF: Yes    Potential assistance Purchasing Medications: No  Meds-to-Beds request: Yes      [facility] [id number] - [place], [place] - [address]  - P [phone] - F [phone]  [address]   [facility] [address]  Phone: [phone] Fax: [phone]      Notes:    Factors facilitating achievement of predicted outcomes: Family support    Barriers to discharge: Unresponsive    Additional Case Management Notes: From home alone. Lives in a 2 story home with an elevator. She has a few living cousins who help her occasionally. I spoke with Monet. She told me that she is the one who found her on the floor. She also said that the patient has had a recent ear infection and had been taking antibiotics. Plan TBD. Patient is currently unresponsive.    The Plan for Transition of Care is related to the following treatment goals of Hyponatremia [E87.1]  Subdural hematoma (HCC) [S06.5XAA]  Thrombocytosis [D75.839]  Altered mental status, unspecified altered mental status type [R41.82]  AMS (altered mental status) [R41.82]    IF APPLICABLE: The Patient and/or patient representative Malika and her family were provided with a choice of provider and agrees with the discharge plan. Freedom of choice list with basic dialogue that supports the patient's individualized plan of care/goals and shares the quality data associated with the providers was provided to:     Patient

## 2024-01-17 NOTE — PLAN OF CARE
Problem: Discharge Planning  Goal: Discharge to home or other facility with appropriate resources  1/13/2024 1235 by Kerri Goodson RN  Outcome: Progressing  Flowsheets (Taken 1/13/2024 0930)  Discharge to home or other facility with appropriate resources: Identify barriers to discharge with patient and caregiver  1/13/2024 0254 by Paulina Black RN  Outcome: Progressing     Problem: Safety - Adult  Goal: Free from fall injury  1/13/2024 1235 by Kerri Goodson RN  Outcome: Progressing  1/13/2024 0254 by Paulina Black RN  Outcome: Progressing  Flowsheets (Taken 1/13/2024 0251)  Free From Fall Injury: Instruct family/caregiver on patient safety     Problem: Confusion  Goal: Confusion, delirium, dementia, or psychosis is improved or at baseline  Description: INTERVENTIONS:  1. Assess for possible contributors to thought disturbance, including medications, impaired vision or hearing, underlying metabolic abnormalities, dehydration, psychiatric diagnoses, and notify attending LIP  2. [place] high risk fall precautions, as indicated  3. Provide frequent short contacts to provide reality reorientation, refocusing and direction  4. Decrease environmental stimuli, including noise as appropriate  5. Monitor and intervene to maintain adequate nutrition, hydration, elimination, sleep and activity  6. If unable to ensure safety without constant attention obtain sitter and review sitter guidelines with assigned personnel  7. Initiate Psychosocial CNS and Spiritual Care consult, as indicated  1/13/2024 1235 by Kerri Goodson RN  Outcome: Progressing  Flowsheets (Taken 1/13/2024 0930)  Effect of thought disturbance (confusion, delirium, dementia, or psychosis) are managed with adequate functional status: Assess for contributors to thought disturbance, including medications, impaired vision or hearing, underlying metabolic abnormalities, dehydration, psychiatric diagnoses, notify LIP  1/13/2024 0254 by Paulina Black 
  Problem: Discharge Planning  Goal: Discharge to home or other facility with appropriate resources  1/14/2024 1037 by Kerri Goodson RN  Outcome: Progressing  Flowsheets (Taken 1/14/2024 0825)  Discharge to home or other facility with appropriate resources: Identify barriers to discharge with patient and caregiver  1/14/2024 0211 by Paulina Black RN  Outcome: Progressing  Flowsheets (Taken 1/13/2024 2100)  Discharge to home or other facility with appropriate resources:   Identify barriers to discharge with patient and caregiver   Arrange for needed discharge resources and transportation as appropriate   Identify discharge learning needs (meds, wound care, etc)   Arrange for interpreters to assist at discharge as needed   Refer to discharge planning if patient needs post-hospital services based on physician order or complex needs related to functional status, cognitive ability or social support system     Problem: Safety - Adult  Goal: Free from fall injury  1/14/2024 1037 by Kerri Goodson RN  Outcome: Progressing  1/14/2024 0211 by Paulina Black RN  Outcome: Progressing  Flowsheets (Taken 1/14/2024 0209)  Free From Fall Injury: Instruct family/caregiver on patient safety     Problem: Confusion  Goal: Confusion, delirium, dementia, or psychosis is improved or at baseline  Description: INTERVENTIONS:  1. Assess for possible contributors to thought disturbance, including medications, impaired vision or hearing, underlying metabolic abnormalities, dehydration, psychiatric diagnoses, and notify attending LIP  2. [place] high risk fall precautions, as indicated  3. Provide frequent short contacts to provide reality reorientation, refocusing and direction  4. Decrease environmental stimuli, including noise as appropriate  5. Monitor and intervene to maintain adequate nutrition, hydration, elimination, sleep and activity  6. If unable to ensure safety without constant attention obtain sitter and review sitter 
  Problem: Discharge Planning  Goal: Discharge to home or other facility with appropriate resources  1/15/2024 1131 by Kerri Goodson RN  Outcome: Progressing  Flowsheets (Taken 1/15/2024 0835)  Discharge to home or other facility with appropriate resources: Identify barriers to discharge with patient and caregiver  1/15/2024 0326 by Paulina Black RN  Outcome: Progressing     Problem: Safety - Adult  Goal: Free from fall injury  1/15/2024 1131 by Kerri Goodson RN  Outcome: Progressing  1/15/2024 0326 by Paulina Black RN  Outcome: Progressing     Problem: Confusion  Goal: Confusion, delirium, dementia, or psychosis is improved or at baseline  Description: INTERVENTIONS:  1. Assess for possible contributors to thought disturbance, including medications, impaired vision or hearing, underlying metabolic abnormalities, dehydration, psychiatric diagnoses, and notify attending LIP  2. [place] high risk fall precautions, as indicated  3. Provide frequent short contacts to provide reality reorientation, refocusing and direction  4. Decrease environmental stimuli, including noise as appropriate  5. Monitor and intervene to maintain adequate nutrition, hydration, elimination, sleep and activity  6. If unable to ensure safety without constant attention obtain sitter and review sitter guidelines with assigned personnel  7. Initiate Psychosocial CNS and Spiritual Care consult, as indicated  1/15/2024 1131 by Kerri Goodson RN  Outcome: Progressing  Flowsheets (Taken 1/15/2024 0835)  Effect of thought disturbance (confusion, delirium, dementia, or psychosis) are managed with adequate functional status: Assess for contributors to thought disturbance, including medications, impaired vision or hearing, underlying metabolic abnormalities, dehydration, psychiatric diagnoses, notify LIP  1/15/2024 0326 by Paulina Black RN  Outcome: Progressing     Problem: Skin/Tissue Integrity  Goal: Absence of new skin breakdown  Description: 1. 
  Problem: Discharge Planning  Goal: Discharge to home or other facility with appropriate resources  1/17/2024 1149 by Mary Cole RN  Outcome: Progressing  Flowsheets (Taken 1/17/2024 0930)  Discharge to home or other facility with appropriate resources: Identify barriers to discharge with patient and caregiver  1/17/2024 0648 by Cameron Haque RN  Outcome: Progressing  Flowsheets (Taken 1/16/2024 1949)  Discharge to home or other facility with appropriate resources:   Identify barriers to discharge with patient and caregiver   Arrange for needed discharge resources and transportation as appropriate   Identify discharge learning needs (meds, wound care, etc)     Problem: Safety - Adult  Goal: Free from fall injury  1/17/2024 1149 by Mary Cole RN  Outcome: Progressing  1/17/2024 0648 by Cameron Haque RN  Outcome: Progressing     Problem: Confusion  Goal: Confusion, delirium, dementia, or psychosis is improved or at baseline  Description: INTERVENTIONS:  1. Assess for possible contributors to thought disturbance, including medications, impaired vision or hearing, underlying metabolic abnormalities, dehydration, psychiatric diagnoses, and notify attending LIP  2. [place] high risk fall precautions, as indicated  3. Provide frequent short contacts to provide reality reorientation, refocusing and direction  4. Decrease environmental stimuli, including noise as appropriate  5. Monitor and intervene to maintain adequate nutrition, hydration, elimination, sleep and activity  6. If unable to ensure safety without constant attention obtain sitter and review sitter guidelines with assigned personnel  7. Initiate Psychosocial CNS and Spiritual Care consult, as indicated  1/17/2024 1149 by Mary Cole RN  Outcome: Progressing  Flowsheets (Taken 1/17/2024 0930)  Effect of thought disturbance (confusion, delirium, dementia, or psychosis) are managed with adequate functional status: Assess for contributors to 
  Problem: Discharge Planning  Goal: Discharge to home or other facility with appropriate resources  Outcome: Progressing     Problem: Safety - Adult  Goal: Free from fall injury  Outcome: Progressing     Problem: Confusion  Goal: Confusion, delirium, dementia, or psychosis is improved or at baseline  Description: INTERVENTIONS:  1. Assess for possible contributors to thought disturbance, including medications, impaired vision or hearing, underlying metabolic abnormalities, dehydration, psychiatric diagnoses, and notify attending LIP  2. [place] high risk fall precautions, as indicated  3. Provide frequent short contacts to provide reality reorientation, refocusing and direction  4. Decrease environmental stimuli, including noise as appropriate  5. Monitor and intervene to maintain adequate nutrition, hydration, elimination, sleep and activity  6. If unable to ensure safety without constant attention obtain sitter and review sitter guidelines with assigned personnel  7. Initiate Psychosocial CNS and Spiritual Care consult, as indicated  Outcome: Progressing     Problem: Skin/Tissue Integrity  Goal: Absence of new skin breakdown  Description: 1.  Monitor for areas of redness and/or skin breakdown  2.  Assess vascular access sites hourly  3.  Every 4-6 hours minimum:  Change oxygen saturation probe site  4.  Every 4-6 hours:  If on nasal continuous positive airway pressure, respiratory therapy assess nares and determine need for appliance change or resting period.  Outcome: Progressing     Problem: ABCDS Injury Assessment  Goal: Absence of physical injury  Outcome: Progressing     
  Problem: Discharge Planning  Goal: Discharge to home or other facility with appropriate resources  Outcome: Progressing     Problem: Safety - Adult  Goal: Free from fall injury  Outcome: Progressing  Flowsheets (Taken 1/13/2024 0251)  Free From Fall Injury: Instruct family/caregiver on patient safety     Problem: Confusion  Goal: Confusion, delirium, dementia, or psychosis is improved or at baseline  Description: INTERVENTIONS:  1. Assess for possible contributors to thought disturbance, including medications, impaired vision or hearing, underlying metabolic abnormalities, dehydration, psychiatric diagnoses, and notify attending LIP  2. [place] high risk fall precautions, as indicated  3. Provide frequent short contacts to provide reality reorientation, refocusing and direction  4. Decrease environmental stimuli, including noise as appropriate  5. Monitor and intervene to maintain adequate nutrition, hydration, elimination, sleep and activity  6. If unable to ensure safety without constant attention obtain sitter and review sitter guidelines with assigned personnel  7. Initiate Psychosocial CNS and Spiritual Care consult, as indicated  Outcome: Progressing  Flowsheets (Taken 1/12/2024 1021 by Stella Melara RN)  Effect of thought disturbance (confusion, delirium, dementia, or psychosis) are managed with adequate functional status:   Assess for contributors to thought disturbance, including medications, impaired vision or hearing, underlying metabolic abnormalities, dehydration, psychiatric diagnoses, notify LIP   [place] high risk fall precautions, as indicated   Provide frequent short contacts to provide reality reorientation, refocusing and direction   Decrease environmental stimuli, including noise as appropriate     Problem: Skin/Tissue Integrity  Goal: Absence of new skin breakdown  Description: 1.  Monitor for areas of redness and/or skin breakdown  2.  Assess vascular access sites hourly  3.  Every 4-6 
  Problem: Discharge Planning  Goal: Discharge to home or other facility with appropriate resources  Outcome: Progressing  Flowsheets (Taken 1/15/2024 2001)  Discharge to home or other facility with appropriate resources:   Identify barriers to discharge with patient and caregiver   Arrange for needed discharge resources and transportation as appropriate   Identify discharge learning needs (meds, wound care, etc)     Problem: Safety - Adult  Goal: Free from fall injury  Outcome: Progressing     Problem: Confusion  Goal: Confusion, delirium, dementia, or psychosis is improved or at baseline  Description: INTERVENTIONS:  1. Assess for possible contributors to thought disturbance, including medications, impaired vision or hearing, underlying metabolic abnormalities, dehydration, psychiatric diagnoses, and notify attending LIP  2. [place] high risk fall precautions, as indicated  3. Provide frequent short contacts to provide reality reorientation, refocusing and direction  4. Decrease environmental stimuli, including noise as appropriate  5. Monitor and intervene to maintain adequate nutrition, hydration, elimination, sleep and activity  6. If unable to ensure safety without constant attention obtain sitter and review sitter guidelines with assigned personnel  7. Initiate Psychosocial CNS and Spiritual Care consult, as indicated  Outcome: Progressing  Flowsheets (Taken 1/15/2024 2001)  Effect of thought disturbance (confusion, delirium, dementia, or psychosis) are managed with adequate functional status:   Assess for contributors to thought disturbance, including medications, impaired vision or hearing, underlying metabolic abnormalities, dehydration, psychiatric diagnoses, notify LIP   [place] high risk fall precautions, as indicated   Provide frequent short contacts to provide reality reorientation, refocusing and direction   Decrease environmental stimuli, including noise as appropriate   Monitor and intervene to 
  Problem: Discharge Planning  Goal: Discharge to home or other facility with appropriate resources  Outcome: Progressing  Flowsheets (Taken 1/16/2024 1949)  Discharge to home or other facility with appropriate resources:   Identify barriers to discharge with patient and caregiver   Arrange for needed discharge resources and transportation as appropriate   Identify discharge learning needs (meds, wound care, etc)     Problem: Safety - Adult  Goal: Free from fall injury  Outcome: Progressing     Problem: Confusion  Goal: Confusion, delirium, dementia, or psychosis is improved or at baseline  Description: INTERVENTIONS:  1. Assess for possible contributors to thought disturbance, including medications, impaired vision or hearing, underlying metabolic abnormalities, dehydration, psychiatric diagnoses, and notify attending LIP  2. [place] high risk fall precautions, as indicated  3. Provide frequent short contacts to provide reality reorientation, refocusing and direction  4. Decrease environmental stimuli, including noise as appropriate  5. Monitor and intervene to maintain adequate nutrition, hydration, elimination, sleep and activity  6. If unable to ensure safety without constant attention obtain sitter and review sitter guidelines with assigned personnel  7. Initiate Psychosocial CNS and Spiritual Care consult, as indicated  Outcome: Progressing  Flowsheets (Taken 1/16/2024 1949)  Effect of thought disturbance (confusion, delirium, dementia, or psychosis) are managed with adequate functional status:   Assess for contributors to thought disturbance, including medications, impaired vision or hearing, underlying metabolic abnormalities, dehydration, psychiatric diagnoses, notify LIP   [place] high risk fall precautions, as indicated   Provide frequent short contacts to provide reality reorientation, refocusing and direction   Decrease environmental stimuli, including noise as appropriate   Monitor and intervene to 
Consult received for patient with altered mental status and CT showing small bilateral mixed density subdural hematomas.  CTs reviewed and I agree with radiology of finding of small bilateral mixed density subdural hematomas.  No surgical intervention required.  Full consult to shanell.    Gucci Zamora MD    
guidelines with assigned personnel  7. Initiate Psychosocial CNS and Spiritual Care consult, as indicated  1/14/2024 0211 by Paulina Black RN  Outcome: Progressing  1/13/2024 1235 by Kerri Goodson RN  Outcome: Progressing  Flowsheets (Taken 1/13/2024 0930)  Effect of thought disturbance (confusion, delirium, dementia, or psychosis) are managed with adequate functional status: Assess for contributors to thought disturbance, including medications, impaired vision or hearing, underlying metabolic abnormalities, dehydration, psychiatric diagnoses, notify LIP     Problem: Skin/Tissue Integrity  Goal: Absence of new skin breakdown  Description: 1.  Monitor for areas of redness and/or skin breakdown  2.  Assess vascular access sites hourly  3.  Every 4-6 hours minimum:  Change oxygen saturation probe site  4.  Every 4-6 hours:  If on nasal continuous positive airway pressure, respiratory therapy assess nares and determine need for appliance change or resting period.  1/14/2024 0211 by Paulina Black RN  Outcome: Progressing  1/13/2024 1235 by Kerri Goodson RN  Outcome: Progressing     Problem: ABCDS Injury Assessment  Goal: Absence of physical injury  1/14/2024 0211 by Paulina Black RN  Outcome: Progressing  Flowsheets (Taken 1/14/2024 0209)  Absence of Physical Injury: Implement safety measures based on patient assessment  1/13/2024 1235 by Kerri Goodson RN  Outcome: Progressing     
notify LIP   [place] high risk fall precautions, as indicated   Provide frequent short contacts to provide reality reorientation, refocusing and direction   Decrease environmental stimuli, including noise as appropriate     
thought disturbance, including medications, impaired vision or hearing, underlying metabolic abnormalities, dehydration, psychiatric diagnoses, notify LIP  1/16/2024 0356 by Cameron Haque RN  Outcome: Progressing  Flowsheets (Taken 1/15/2024 2001)  Effect of thought disturbance (confusion, delirium, dementia, or psychosis) are managed with adequate functional status:   Assess for contributors to thought disturbance, including medications, impaired vision or hearing, underlying metabolic abnormalities, dehydration, psychiatric diagnoses, notify LIP   [place] high risk fall precautions, as indicated   Provide frequent short contacts to provide reality reorientation, refocusing and direction   Decrease environmental stimuli, including noise as appropriate   Monitor and intervene to maintain adequate nutrition, hydration, elimination, sleep and activity     Problem: Skin/Tissue Integrity  Goal: Absence of new skin breakdown  Description: 1.  Monitor for areas of redness and/or skin breakdown  2.  Assess vascular access sites hourly  3.  Every 4-6 hours minimum:  Change oxygen saturation probe site  4.  Every 4-6 hours:  If on nasal continuous positive airway pressure, respiratory therapy assess nares and determine need for appliance change or resting period.  1/16/2024 1022 by Mary Cole RN  Outcome: Progressing  1/16/2024 0356 by Cameron Haque RN  Outcome: Progressing     Problem: ABCDS Injury Assessment  Goal: Absence of physical injury  1/16/2024 1022 by Mary Cole RN  Outcome: Progressing  1/16/2024 0356 by Cameron Haque RN  Outcome: Progressing     Problem: Pain  Goal: Verbalizes/displays adequate comfort level or baseline comfort level  1/16/2024 1022 by Mary Cole RN  Outcome: Progressing  1/16/2024 0356 by Cameron Haque RN  Outcome: Progressing  Flowsheets (Taken 1/15/2024 2001)  Verbalizes/displays adequate comfort level or baseline comfort level:   Encourage patient to monitor

## 2024-01-17 NOTE — DISCHARGE INSTR - COC
bleeding (HCC) I62.00    Subdural hematoma (HCC) S06.5XAA    Longstanding persistent atrial fibrillation (HCC) I48.11       Isolation/Infection:   Isolation            No Isolation          Patient Infection Status       None to display                     Nurse Assessment:  Last Vital Signs: BP (!) 146/65   Pulse 99   Temp 97.7 °F (36.5 °C) (Oral)   Resp 18   Ht 1.6 m (5' 3\")   Wt 78.9 kg (173 lb 15.1 oz)   SpO2 93%   BMI 30.81 kg/m²     Last documented pain score (0-10 scale): Pain Level: 6  Last Weight:   Wt Readings from Last 1 Encounters:   01/17/24 78.9 kg (173 lb 15.1 oz)     Mental Status:  oriented, alert, coherent, logical, thought processes intact, and able to concentrate and follow conversation    IV Access:  - None    Nursing Mobility/ADLs:  Walking   Assisted  Transfer  Assisted  Bathing  Assisted  Dressing  Assisted  Toileting  Independent  Feeding  Independent  Med Admin  Independent  Med Delivery   whole    Wound Care Documentation and Therapy:        Elimination:  Continence:   Bowel: No  Bladder: No  Urinary Catheter: None   Colostomy/Ileostomy/Ileal Conduit: No       Date of Last BM: 01/16/2024    Intake/Output Summary (Last 24 hours) at 1/17/2024 1431  Last data filed at 1/16/2024 1841  Gross per 24 hour   Intake 596 ml   Output --   Net 596 ml     I/O last 3 completed shifts:  In: 1312 [P.O.:1312]  Out: 400 [Urine:400]    Safety Concerns:     At Risk for Falls    Impairments/Disabilities:      None    Nutrition Therapy:  Current Nutrition Therapy:   - Oral Diet:  General    Routes of Feeding: Oral  Liquids: No Restrictions  Daily Fluid Restriction: yes - amount 1500 mL per day  Last Modified Barium Swallow with Video (Video Swallowing Test): not done    Treatments at the Time of Hospital Discharge:   Respiratory Treatments: ***  Oxygen Therapy:  is not on home oxygen therapy.  Ventilator:    - No ventilator support    Rehab Therapies: Physical Therapy and Occupational Therapy  Weight

## 2024-01-17 NOTE — DISCHARGE SUMMARY
Discharge Summary    Name:  Malika Rosas /Age/Sex: 1955  (69 y.o. female)   MRN & CSN:  [id number] & [id number] Admission Date/Time: 2024  6:33 PM   Attending:  Wesley Wen MD Discharging Physician: Wesley Wen MD     Hospital Course:   Malika Rosas is a 69 y.o.  female with medical history of HTN, HLD, chronic diastolic heart failure bronchial asthma, CIDP, GERD, obesity who presents admitted for altered mental status likely multifactorial was noted to have A-fib with RVR, hyponatremia-  multiple teams were consulted, patient was evaluated by neurosurgery, neurology, cardiology, patient scan evaluation noted to have SDH, chronic appearing, was completed which redemonstrated bilateral SDH, neurology recommended EEG but patient refused,  Neurosurgery recommended holding off patient to patient    Altered mental status likely multifactorial.  -CT head with stable bilateral mixed density subdural hematoma likely chronic as they were also present on CT head of 10/6/2023.  -MRI brain redemonstrated bilateral subdural hematoma.  Some areas of hyperattenuation most likely artifact.  Less likely blood in the subarachnoid space.  -EEG pt refused  Neuro recommend outpt f/up  -PT OT recommending SNF.  However patient is adamantly refusing SNF.  will go home with [facility]    Atrial fibrillation with RVR- now controlled   -Patient went into A-fib with RVR early a.m. .  Fairly asymptomatic  -Patient not a good candidate for anticoagulation given her subdural hematomas  -Cardiology consulted-case d/w them- recommend CCB,BB, digoxin- will f/up outpt      Bilateral subdural hematoma likely chronic.  -Per neurosurgery no need for surgical intervention.  -NSGY recommned hold off on asa/ac for 2 weeks- repeat eval outpt  -case d/w NSGY    Acute on chronic hyponatremia-resolved  -Nephro following- case d/w them- ok to d.c    Hypokalemia-resolved    Leukocytosis -improving  -Urinalysis negative  -Chest x-ray